# Patient Record
Sex: FEMALE | Race: WHITE | NOT HISPANIC OR LATINO | Employment: OTHER | URBAN - METROPOLITAN AREA
[De-identification: names, ages, dates, MRNs, and addresses within clinical notes are randomized per-mention and may not be internally consistent; named-entity substitution may affect disease eponyms.]

---

## 2017-04-13 DIAGNOSIS — F90.0 ATTENTION DEFICIT HYPERACTIVITY DISORDER (ADHD), PREDOMINANTLY INATTENTIVE TYPE: Chronic | ICD-10-CM

## 2017-04-13 RX ORDER — DEXTROAMPHETAMINE SACCHARATE, AMPHETAMINE ASPARTATE, DEXTROAMPHETAMINE SULFATE AND AMPHETAMINE SULFATE 5; 5; 5; 5 MG/1; MG/1; MG/1; MG/1
20 TABLET ORAL 2 TIMES DAILY
Qty: 60 TABLET | Refills: 0 | Status: SHIPPED | OUTPATIENT
Start: 2017-04-13 | End: 2017-05-31

## 2017-04-13 NOTE — TELEPHONE ENCOUNTER
"Called pt's ph# in chart-Got message \"the pt you are trying to reach is not available\". Unable to leave VM.     Rx is at my desk-Need to find out if pt wants us to mail rx or bring down to [facility]   "

## 2017-04-13 NOTE — TELEPHONE ENCOUNTER
Pt calling for refill of Adderall. Last written 12/26/16 #60 with 0 refills.   She would like to pick it up from the clinic so she can take it to her [facility].

## 2017-05-30 ENCOUNTER — TELEPHONE (OUTPATIENT)
Dept: INTERNAL MEDICINE | Facility: CLINIC | Age: 27
End: 2017-05-30

## 2017-05-30 DIAGNOSIS — F90.0 ATTENTION DEFICIT HYPERACTIVITY DISORDER (ADHD), PREDOMINANTLY INATTENTIVE TYPE: Chronic | ICD-10-CM

## 2017-05-30 NOTE — TELEPHONE ENCOUNTER
Reason for Call:  Medication or medication refill:    Do you use a [facility]?  Name of the pharmacy and phone number for the current request:  [facility]    Name of the medication requested: adderall    Other request: none    Can we leave a detailed message on this number? YES    Phone number patient can be reached at: Home number on file [phone] (home)    Best Time: anytime    Call taken on 5/30/2017 at 3:43 PM by Macie Diaz

## 2017-06-01 RX ORDER — DEXTROAMPHETAMINE SACCHARATE, AMPHETAMINE ASPARTATE, DEXTROAMPHETAMINE SULFATE AND AMPHETAMINE SULFATE 5; 5; 5; 5 MG/1; MG/1; MG/1; MG/1
20 TABLET ORAL 2 TIMES DAILY
Qty: 60 TABLET | Refills: 0 | Status: SHIPPED | OUTPATIENT
Start: 2017-06-01 | End: 2017-06-30

## 2017-06-30 ENCOUNTER — TELEPHONE (OUTPATIENT)
Dept: INTERNAL MEDICINE | Facility: CLINIC | Age: 27
End: 2017-06-30

## 2017-06-30 DIAGNOSIS — F90.0 ATTENTION DEFICIT HYPERACTIVITY DISORDER (ADHD), PREDOMINANTLY INATTENTIVE TYPE: Chronic | ICD-10-CM

## 2017-06-30 RX ORDER — DEXTROAMPHETAMINE SACCHARATE, AMPHETAMINE ASPARTATE, DEXTROAMPHETAMINE SULFATE AND AMPHETAMINE SULFATE 5; 5; 5; 5 MG/1; MG/1; MG/1; MG/1
20 TABLET ORAL 2 TIMES DAILY
Qty: 60 TABLET | Refills: 0 | Status: SHIPPED | OUTPATIENT
Start: 2017-07-29 | End: 2017-12-07

## 2017-06-30 RX ORDER — DEXTROAMPHETAMINE SACCHARATE, AMPHETAMINE ASPARTATE, DEXTROAMPHETAMINE SULFATE AND AMPHETAMINE SULFATE 5; 5; 5; 5 MG/1; MG/1; MG/1; MG/1
20 TABLET ORAL 2 TIMES DAILY
Qty: 60 TABLET | Refills: 0 | Status: SHIPPED | OUTPATIENT
Start: 2017-06-30 | End: 2017-09-05

## 2017-06-30 RX ORDER — DEXTROAMPHETAMINE SACCHARATE, AMPHETAMINE ASPARTATE, DEXTROAMPHETAMINE SULFATE AND AMPHETAMINE SULFATE 7.5; 7.5; 7.5; 7.5 MG/1; MG/1; MG/1; MG/1
30 TABLET ORAL 2 TIMES DAILY
Qty: 60 TABLET | Refills: 0 | Status: SHIPPED | OUTPATIENT
Start: 2017-08-28 | End: 2017-12-07 | Stop reason: DRUGHIGH

## 2017-06-30 NOTE — TELEPHONE ENCOUNTER
(Reason for Call:  Medication or medication refill:    Do you use a [facility]?  Name of the pharmacy and phone number for the current request:  [facility] [address] [facility] [address] [place] - [phone]    Name of the medication requested: Adderoll    Other request: no    Can we leave a detailed message on this number? YES    Phone number patient can be reached at: Cell number on file:    Telephone Information:   Mobile [phone]       Best Time: any    Call taken on 6/30/2017 at 1:20 PM by Estefany Farrell

## 2017-06-30 NOTE — TELEPHONE ENCOUNTER
Adderall      Last Written Prescription Date:  6/1/17  Last Fill Quantity: 60,   # refills: 0  Last Office Visit with [facility] prescribing provider: 10/20/16  Future Office visit:       Routing refill request to provider for review/approval because:  Drug not on the [facility] refill protocol or controlled substance.  Signed CSA form in chart.  [facility].

## 2017-06-30 NOTE — TELEPHONE ENCOUNTER
Attempted to contact patient, no answer, left voice message to call back.  Rx hand carried to [facility]

## 2017-09-05 ENCOUNTER — TELEPHONE (OUTPATIENT)
Dept: INTERNAL MEDICINE | Facility: CLINIC | Age: 27
End: 2017-09-05

## 2017-09-05 DIAGNOSIS — F90.0 ATTENTION DEFICIT HYPERACTIVITY DISORDER (ADHD), PREDOMINANTLY INATTENTIVE TYPE: Chronic | ICD-10-CM

## 2017-09-05 RX ORDER — DEXTROAMPHETAMINE SACCHARATE, AMPHETAMINE ASPARTATE, DEXTROAMPHETAMINE SULFATE AND AMPHETAMINE SULFATE 5; 5; 5; 5 MG/1; MG/1; MG/1; MG/1
20 TABLET ORAL 2 TIMES DAILY
Qty: 60 TABLET | Refills: 0 | Status: SHIPPED | OUTPATIENT
Start: 2017-09-05 | End: 2017-12-07

## 2017-09-05 NOTE — TELEPHONE ENCOUNTER
Reason for Call:  Medication or medication refill:    Do you use a [facility]?  Name of the [facility] and phone number for the current request[facility] [address] ([place]) - [phone]    Name of the medication requested: adderall    Other request: none    Can we leave a detailed message on this number? YES    Phone number patient can be reached at: Home number on file [phone] (home)    Best Time: any    Call taken on 9/5/2017 at 12:03 PM by Ju Daniels

## 2017-09-05 NOTE — TELEPHONE ENCOUNTER
[facility].    Adderall      Last Written Prescription Date:  8/28/17  Last Fill Quantity: 60,   # refills: 0  Last Office Visit with [facility], [facility] or [facility] prescribing provider: 10/20/16  Future Office visit:       Routing refill request to provider for review/approval because:  Drug not on the [facility], [facility] or [facility] refill protocol or controlled substance.

## 2017-09-06 NOTE — TELEPHONE ENCOUNTER
Please talk to the patient    I think she ( or the pharmacy) lost some prescriptions and she should look for them.   She should have plenty from [facility] from August 28    I issued a new one since she hasn't filled them ( checked in [place] prescription program)    She is due for annual exam in October

## 2017-09-06 NOTE — TELEPHONE ENCOUNTER
LM on patient's cell number to return call to clinic.  Rx in [facility] triage box.  CINDY Hernandez R.N.

## 2017-09-08 NOTE — TELEPHONE ENCOUNTER
Pt calling back.  She didn't realize PCP gave her 3 months of rx's in June.  She will check with pharm for refilling.    Per Yanelis in [facility]: June rx was for Adderall 20mg tabs, July rx was for Adderall 20mg tabs, and August rx was for Adderall 30mg tabs.      Spoke with PCP, she did the Aug rx for 30mg tabs by mistake--states should have been for 20mg tabs.  Advised Yanelis of this and she states the July rx wasn't filled yet.  She will fill the July rx for pt for this month.  And will shred the Aug rx for 30mg tabs.    The Adderall rx dated 9-5-17 destroyed.

## 2017-12-04 DIAGNOSIS — Z30.8 ENCOUNTER FOR OTHER CONTRACEPTIVE MANAGEMENT: ICD-10-CM

## 2017-12-07 ENCOUNTER — OFFICE VISIT (OUTPATIENT)
Dept: INTERNAL MEDICINE | Facility: CLINIC | Age: 27
End: 2017-12-07
Payer: COMMERCIAL

## 2017-12-07 VITALS
HEIGHT: 67 IN | TEMPERATURE: 98.2 F | WEIGHT: 147.7 LBS | BODY MASS INDEX: 23.18 KG/M2 | SYSTOLIC BLOOD PRESSURE: 100 MMHG | OXYGEN SATURATION: 98 % | HEART RATE: 90 BPM | DIASTOLIC BLOOD PRESSURE: 70 MMHG

## 2017-12-07 DIAGNOSIS — Z30.8 ENCOUNTER FOR OTHER CONTRACEPTIVE MANAGEMENT: ICD-10-CM

## 2017-12-07 DIAGNOSIS — F90.0 ATTENTION DEFICIT HYPERACTIVITY DISORDER (ADHD), PREDOMINANTLY INATTENTIVE TYPE: Chronic | ICD-10-CM

## 2017-12-07 PROCEDURE — 99213 OFFICE O/P EST LOW 20 MIN: CPT | Performed by: FAMILY MEDICINE

## 2017-12-07 RX ORDER — NORGESTIMATE-ETHINYL ESTRADIOL 7DAYSX3 28
TABLET ORAL
Qty: 84 TABLET | Refills: 0 | OUTPATIENT
Start: 2017-12-07

## 2017-12-07 RX ORDER — NORGESTIMATE AND ETHINYL ESTRADIOL 7DAYSX3 28
1 KIT ORAL DAILY
Qty: 84 TABLET | Refills: 3 | Status: SHIPPED | OUTPATIENT
Start: 2017-12-07 | End: 2018-05-09

## 2017-12-07 RX ORDER — DEXTROAMPHETAMINE SACCHARATE, AMPHETAMINE ASPARTATE, DEXTROAMPHETAMINE SULFATE AND AMPHETAMINE SULFATE 5; 5; 5; 5 MG/1; MG/1; MG/1; MG/1
20 TABLET ORAL 2 TIMES DAILY
Qty: 60 TABLET | Refills: 0 | Status: SHIPPED | OUTPATIENT
Start: 2017-12-07 | End: 2018-01-19

## 2017-12-07 ASSESSMENT — ANXIETY QUESTIONNAIRES
2. NOT BEING ABLE TO STOP OR CONTROL WORRYING: NOT AT ALL
GAD7 TOTAL SCORE: 4
7. FEELING AFRAID AS IF SOMETHING AWFUL MIGHT HAPPEN: SEVERAL DAYS
6. BECOMING EASILY ANNOYED OR IRRITABLE: SEVERAL DAYS
IF YOU CHECKED OFF ANY PROBLEMS ON THIS QUESTIONNAIRE, HOW DIFFICULT HAVE THESE PROBLEMS MADE IT FOR YOU TO DO YOUR WORK, TAKE CARE OF THINGS AT HOME, OR GET ALONG WITH OTHER PEOPLE: SOMEWHAT DIFFICULT
3. WORRYING TOO MUCH ABOUT DIFFERENT THINGS: NOT AT ALL
1. FEELING NERVOUS, ANXIOUS, OR ON EDGE: SEVERAL DAYS
5. BEING SO RESTLESS THAT IT IS HARD TO SIT STILL: NOT AT ALL

## 2017-12-07 ASSESSMENT — PATIENT HEALTH QUESTIONNAIRE - PHQ9: 5. POOR APPETITE OR OVEREATING: SEVERAL DAYS

## 2017-12-07 NOTE — PROGRESS NOTES
"CHIEF COMPLAINT    Med refill - Adderall      HISTORY    She has a h/o ADHD, combined for about 4 years.    Back in school right now would like to refill.      BCP's working well. Cycle regular.    Patient Active Problem List   Diagnosis     CARDIOVASCULAR SCREENING; LDL GOAL LESS THAN 160     Anxiety     Concentration deficit     ADHD, inattentive type     Current Outpatient Prescriptions   Medication Sig Dispense Refill     amphetamine-dextroamphetamine (ADDERALL) 20 MG per tablet Take 1 tablet (20 mg) by mouth 2 times daily 60 tablet 0     norgestim-eth estrad triphasic (TRINESSA, 28,) 0.18/0.215/0.25 MG-35 MCG per tablet Take 1 tablet by mouth daily 84 tablet 3       REVIEW OF SYSTEMS    Some wt gain  No HA  No CP or palpitation  No SOB      Past Medical History:   Diagnosis Date     Anxiety      Pap smear for cervical cancer screening 2008    biopsy taken and ok       EXAM  /70 (BP Location: Right arm, Patient Position: Sitting, Cuff Size: Adult Large)  Pulse 90  Temp 98.2  F (36.8  C) (Oral)  Ht 5' 6.75\" (1.695 m)  Wt 147 lb 11.2 oz (67 kg)  SpO2 98%  BMI 23.31 kg/m2    HEENT: neg  Neck: no mass  Chest: cl  CV: no murmur, RSR  Motor and gait normal      (F90.0) ADHD, inattentive type  Comment:   Plan: amphetamine-dextroamphetamine (ADDERALL) 20 MG         per tablet            (Z30.8) Encounter for other contraceptive management  Comment: refill  Plan: norgestim-eth estrad triphasic (TRINESSA, 28,)         0.18/0.215/0.25 MG-35 MCG per tablet              "

## 2017-12-07 NOTE — NURSING NOTE
"Chief Complaint   Patient presents with     Recheck Medication       Initial /70 (BP Location: Right arm, Patient Position: Sitting, Cuff Size: Adult Large)  Pulse 90  Temp 98.2  F (36.8  C) (Oral)  Ht 5' 6.75\" (1.695 m)  Wt 147 lb 11.2 oz (67 kg)  SpO2 98%  BMI 23.31 kg/m2 Estimated body mass index is 23.31 kg/(m^2) as calculated from the following:    Height as of this encounter: 5' 6.75\" (1.695 m).    Weight as of this encounter: 147 lb 11.2 oz (67 kg).  Medication Reconciliation: complete    "

## 2017-12-07 NOTE — MR AVS SNAPSHOT
"              After Visit Summary   2017    Gerri Rosado    MRN: [id number]           Patient Information     Date Of Birth          1990        Visit Information        Provider Department      2017 10:40 AM Regis Garcia MD [facility]        Today's Diagnoses     ADHD, inattentive type        Encounter for other contraceptive management           Follow-ups after your visit        Who to contact     If you have questions or need follow up information about today's clinic visit or your schedule please contact [facility] directly at [phone].  Normal or non-critical lab and imaging results will be communicated to you by Perkhart, letter or phone within 4 business days after the clinic has received the results. If you do not hear from us within 7 days, please contact the clinic through Perkhart or phone. If you have a critical or abnormal lab result, we will notify you by phone as soon as possible.  Submit refill requests through uVore or call your pharmacy and they will forward the refill request to us. Please allow 3 business days for your refill to be completed.          Additional Information About Your Visit        MyChart Information     uVore lets you send messages to your doctor, view your test results, renew your prescriptions, schedule appointments and more. To sign up, go to [email or web address] . Click on \"Log in\" on the left side of the screen, which will take you to the Welcome page. Then click on \"Sign up Now\" on the right side of the page.     You will be asked to enter the access code listed below, as well as some personal information. Please follow the directions to create your username and password.     Your access code is: [email or web address]  Expires: 3/7/2018 11:24 AM     Your access code will  in 90 days. If you need help or a new code, please call your [facility] or [phone].        Care EveryWhere ID     This is your " "Care EveryWhere ID. This could be used by other organizations to access your [place] medical records  [id number]        Your Vitals Were     Pulse Temperature Height Pulse Oximetry BMI (Body Mass Index)       90 98.2  F (36.8  C) (Oral) 5' 6.75\" (1.695 m) 98% 23.31 kg/m2        Blood Pressure from Last 3 Encounters:   12/07/17 100/70   10/20/16 110/72   10/19/15 (!) 88/58    Weight from Last 3 Encounters:   12/07/17 147 lb 11.2 oz (67 kg)   10/20/16 136 lb 3.2 oz (61.8 kg)   10/19/15 132 lb 6.4 oz (60.1 kg)              Today, you had the following     No orders found for display         Today's Medication Changes          These changes are accurate as of: 12/7/17 11:24 AM.  If you have any questions, ask your nurse or doctor.               These medicines have changed or have updated prescriptions.        Dose/Directions    amphetamine-dextroamphetamine 20 MG per tablet   Commonly known as:  ADDERALL   This may have changed:  Another medication with the same name was removed. Continue taking this medication, and follow the directions you see here.   Used for:  Attention deficit hyperactivity disorder (ADHD), predominantly inattentive type   Changed by:  Regis Garcia MD        Dose:  20 mg   Take 1 tablet (20 mg) by mouth 2 times daily   Quantity:  60 tablet   Refills:  0            Where to get your medicines      These medications were sent to [facility] [address] [place] [address] & [facility]  [address] [place] [address]     Phone:  [phone]     norgestim-eth estrad triphasic 0.18/0.215/0.25 MG-35 MCG per tablet         Some of these will need a paper prescription and others can be bought over the counter.  Ask your nurse if you have questions.     Bring a paper prescription for each of these medications     amphetamine-dextroamphetamine 20 MG per tablet                Primary Care Provider Office Phone # Fax #    Mary Ann Aguilera MD " [phone]       [address]  [facility] [address]        Equal Access to Services     VARUN SOLER : Hadii kandy donnelly asher Angelo, waaxda luqadaha, qaybta kaalmada lilliam, france perera laJnyesy frank. So [facility] [phone].    ATENCIÓN: Si habla español, tiene a koenig disposición servicios gratuitos de asistencia lingüística. Llame al [phone].    We comply with applicable federal civil rights laws and [place] laws. We do not discriminate on the basis of race, color, national origin, age, disability, sex, sexual orientation, or gender identity.            Thank you!     Thank you for choosing [facility]  for your care. Our goal is always to provide you with excellent care. Hearing back from our patients is one way we can continue to improve our services. Please take a few minutes to complete the written survey that you may receive in the mail after your visit with us. Thank you!             Your Updated Medication List - Protect others around you: Learn how to safely use, store and throw away your medicines at www.disposemymeds.org.          This list is accurate as of: 12/7/17 11:24 AM.  Always use your most recent med list.                   Brand Name Dispense Instructions for use Diagnosis    amphetamine-dextroamphetamine 20 MG per tablet    ADDERALL    60 tablet    Take 1 tablet (20 mg) by mouth 2 times daily    Attention deficit hyperactivity disorder (ADHD), predominantly inattentive type       norgestim-eth estrad triphasic 0.18/0.215/0.25 MG-35 MCG per tablet    TRINESSA (28)    84 tablet    Take 1 tablet by mouth daily    Encounter for other contraceptive management

## 2017-12-08 ASSESSMENT — ANXIETY QUESTIONNAIRES: GAD7 TOTAL SCORE: 4

## 2018-01-19 DIAGNOSIS — F41.9 ANXIETY: ICD-10-CM

## 2018-01-19 DIAGNOSIS — F90.0 ATTENTION DEFICIT HYPERACTIVITY DISORDER (ADHD), PREDOMINANTLY INATTENTIVE TYPE: Chronic | ICD-10-CM

## 2018-01-19 DIAGNOSIS — Z79.899 CONTROLLED SUBSTANCE AGREEMENT SIGNED: Primary | ICD-10-CM

## 2018-01-19 DIAGNOSIS — R41.840 CONCENTRATION DEFICIT: ICD-10-CM

## 2018-01-19 RX ORDER — DEXTROAMPHETAMINE SACCHARATE, AMPHETAMINE ASPARTATE, DEXTROAMPHETAMINE SULFATE AND AMPHETAMINE SULFATE 5; 5; 5; 5 MG/1; MG/1; MG/1; MG/1
20 TABLET ORAL 2 TIMES DAILY
Qty: 60 TABLET | Refills: 0 | Status: SHIPPED | OUTPATIENT
Start: 2018-01-19 | End: 2018-02-06

## 2018-01-19 NOTE — TELEPHONE ENCOUNTER
Call received from pt requesting refill for Adderral. Requesting rx be mailed.    Adderral      Last Written Prescription Date:  12/7/17  Last Fill Quantity: 60,   # refills: 0  Last Office Visit: 12/7/17  Future Office visit:       Routing refill request to provider for review/approval because:  Drug not on the [facility], [facility] or  [facility] refill protocol or controlled substance  CSA signed  [place]:  reviewed- no concerns    [place]  [email or web address]

## 2018-02-06 DIAGNOSIS — F90.0 ATTENTION DEFICIT HYPERACTIVITY DISORDER (ADHD), PREDOMINANTLY INATTENTIVE TYPE: Chronic | ICD-10-CM

## 2018-02-06 RX ORDER — DEXTROAMPHETAMINE SACCHARATE, AMPHETAMINE ASPARTATE, DEXTROAMPHETAMINE SULFATE AND AMPHETAMINE SULFATE 5; 5; 5; 5 MG/1; MG/1; MG/1; MG/1
20 TABLET ORAL 2 TIMES DAILY
Qty: 60 TABLET | Refills: 0 | Status: SHIPPED | OUTPATIENT
Start: 2018-02-06 | End: 2018-03-15

## 2018-02-06 NOTE — TELEPHONE ENCOUNTER
Pt is calling because [facility] still has not received the Adderall Rx that was mailed out on 1/19.   Call to [facility] and confirmed that they didn't received the Rx from 1/19.     Pt states she is out of the medication and is asking if she can come to the clinic to  Rx.     Will route to Dr Maravilla for new Rx.

## 2018-03-13 ENCOUNTER — TELEPHONE (OUTPATIENT)
Dept: INTERNAL MEDICINE | Facility: CLINIC | Age: 28
End: 2018-03-13

## 2018-03-13 DIAGNOSIS — F90.0 ATTENTION DEFICIT HYPERACTIVITY DISORDER (ADHD), PREDOMINANTLY INATTENTIVE TYPE: Chronic | ICD-10-CM

## 2018-03-13 NOTE — TELEPHONE ENCOUNTER
Reason for Call:  Medication or medication refill:    Do you use a [facility]? Yes     Name of the pharmacy and phone number for the current request:  [facility]     Name of the medication requested: adderall    Other request: none    Can we leave a detailed message on this number? YES    Phone number patient can be reached at: Home number on file [phone] (home)    Best Time: asap    Call taken on 3/13/2018 at 3:52 PM by Olamide [place]

## 2018-03-13 NOTE — TELEPHONE ENCOUNTER
Adderall  20 mg    Last Written Prescription Date:  2/6/18  Last Fill Quantity: 60,   # refills: 0  Last Office Visit: 12/7/17  Future Office visit:       Routing refill request to provider for review/approval because:  Drug not on the [facility], [facility] or  [facility] refill protocol or controlled substance  Signed CSA on file  [place] reviewed:  reviewed- no concerns    [place]:  [email or web address]

## 2018-03-15 RX ORDER — DEXTROAMPHETAMINE SACCHARATE, AMPHETAMINE ASPARTATE, DEXTROAMPHETAMINE SULFATE AND AMPHETAMINE SULFATE 5; 5; 5; 5 MG/1; MG/1; MG/1; MG/1
20 TABLET ORAL 2 TIMES DAILY
Qty: 60 TABLET | Refills: 0 | Status: SHIPPED | OUTPATIENT
Start: 2018-03-15 | End: 2018-04-19

## 2018-03-15 NOTE — TELEPHONE ENCOUNTER
Rx done per PCP and will be hand carried to [facility] around lunch time today.    Detailed message left on pt's vm.

## 2018-03-16 ENCOUNTER — OFFICE VISIT (OUTPATIENT)
Dept: OBGYN | Facility: CLINIC | Age: 28
End: 2018-03-16
Payer: COMMERCIAL

## 2018-03-16 VITALS
BODY MASS INDEX: 23.43 KG/M2 | SYSTOLIC BLOOD PRESSURE: 106 MMHG | HEIGHT: 67 IN | DIASTOLIC BLOOD PRESSURE: 74 MMHG | WEIGHT: 149.3 LBS

## 2018-03-16 DIAGNOSIS — Z30.8 ENCOUNTER FOR OTHER CONTRACEPTIVE MANAGEMENT: Primary | ICD-10-CM

## 2018-03-16 PROCEDURE — 99201 ZZC OFFICE/OUTPT VISIT, NEW, LEVEL I: CPT | Performed by: ADVANCED PRACTICE MIDWIFE

## 2018-03-16 NOTE — NURSING NOTE
"Chief Complaint   Patient presents with     Consult     Contraception     Discuss IUD.    Initial /74  Ht 5' 6.75\" (1.695 m)  Wt 149 lb 4.8 oz (67.7 kg)  LMP 02/26/2018  Breastfeeding? No  BMI 23.56 kg/m2 Estimated body mass index is 23.56 kg/(m^2) as calculated from the following:    Height as of this encounter: 5' 6.75\" (1.695 m).    Weight as of this encounter: 149 lb 4.8 oz (67.7 kg).  Medication Reconciliation: complete     Herminia Garza, YE      "

## 2018-03-16 NOTE — PROGRESS NOTES
SUBJECTIVE:   Gerri Rosado is a 27 year old who presents to the clinic for discussion of birth control methods.   She has used the following methods in the past: ANGELA  Today she is interested in discussing Mirena IUD.  Gerri presents to the clinic without an appointment to discuss getting an IUD. She is currently using OCP's. She struggles to remember to take them. She is nulliparous.   Histories reviewed and updated  Past Medical History:   Diagnosis Date     Anxiety      Pap smear for cervical cancer screening 2008    biopsy taken and ok     Past Surgical History:   Procedure Laterality Date     NO HISTORY OF SURGERY       Social History     Social History     Marital status: Single     Spouse name: N/A     Number of children: N/A     Years of education: N/A     Occupational History     Not on file.     Social History Main Topics     Smoking status: Former Smoker     Packs/day: 0.10     Years: 5.00     Types: Cigarettes     Quit date: 12/2/2012     Smokeless tobacco: Never Used     Alcohol use Yes      Comment: 2 drinks weekly     Drug use: No     Sexual activity: Yes     Partners: Male     Birth control/ protection: Pill     Other Topics Concern     Not on file     Social History Narrative     Family History   Problem Relation Age of Onset     CANCER Mother      thyroid     Hypertension Father      Lipids Father      Breast Cancer Other      Depression Other      Anxiety Disorder Other      MENTAL ILLNESS Other      Substance Abuse Other        Menstrual History:  Menses every 28 days.  Length of menses: 5 days  Menstrual description: normal    Denies the following contraindications to estrogen/progesterone combined contraception:  Migraine with aura  Smoking over age 35  Liver disease  Personal history of blood clot or stroke   History of heart disease  History of breast cancer  Undiagnosed vaginal bleeding  Hypertension  Pregnancy    Health maintenance updated:  yes    ROS:   12 point review of systems  "negative other than symptoms noted below.    EXAM:  /74  Ht 5' 6.75\" (1.695 m)  Wt 149 lb 4.8 oz (67.7 kg)  LMP 02/26/2018  Breastfeeding? No  BMI 23.56 kg/m2  Body mass index is 23.56 kg/(m^2).    ASSESSMENT/PLAN:    ICD-10-CM    1. Encounter for other contraceptive management Z30.8      There are no contraindications to the use of Mirena IUD    COUNSELING:  Reviewed risks and benefits of contraceptive use  Mirena IUD. Educated on insertion/MOA/SE/risk. Advised patient that it is best to come in for insertion on her menstrual cycle. Patient agrees with plan.   Discussed proper use of chosen method  Handouts/Instrucions provided    Sharla Honeycutt CNM    "

## 2018-03-16 NOTE — MR AVS SNAPSHOT
"              After Visit Summary   3/16/2018    Gerir Rosado    MRN: [id number]           Patient Information     Date Of Birth          1990        Visit Information        Provider Department      3/16/2018 1:30 PM Sharla Honeycutt APRN CNM [facility]        Today's Diagnoses     Encounter for other contraceptive management    -  1       Follow-ups after your visit        Follow-up notes from your care team     Return for IUD insertion on cycle.      Your next 10 appointments already scheduled     Mar 28, 2018 10:45 AM CDT   IUD INSERTION AND REMOVAL with QUAN Olivera CNM   [facility]    [address]  [facility] [id number] [phone]              Who to contact     If you have questions or need follow up information about today's clinic visit or your schedule please contact [facility] directly at [phone].  Normal or non-critical lab and imaging results will be communicated to you by MyChart, letter or phone within 4 business days after the clinic has received the results. If you do not hear from us within 7 days, please contact the clinic through Omnia Mediahart or phone. If you have a critical or abnormal lab result, we will notify you by phone as soon as possible.  Submit refill requests through [email or web address] or call your pharmacy and they will forward the refill request to us. Please allow 3 business days for your refill to be completed.          Additional Information About Your Visit        Omnia Mediahart Information     [email or web address] lets you send messages to your doctor, view your test results, renew your prescriptions, schedule appointments and more. To sign up, go to [email or web address] . Click on \"Log in\" on the left side of the screen, which will take you to the Welcome page. Then click on \"Sign up Now\" on the right side of the page.     You will be asked to enter the access code listed " "below, as well as some personal information. Please follow the directions to create your username and password.     Your access code is: [email or web address]  Expires: 2018  1:44 PM     Your access code will  in 90 days. If you need help or a new code, please call your [place] clinic or [phone].        Care EveryWhere ID     This is your Care EveryWhere ID. This could be used by other organizations to access your [place] medical records  [id number]        Your Vitals Were     Height Last Period Breastfeeding? BMI (Body Mass Index)          5' 6.75\" (1.695 m) 2018 No 23.56 kg/m2         Blood Pressure from Last 3 Encounters:   18 106/74   17 100/70   10/20/16 110/72    Weight from Last 3 Encounters:   18 149 lb 4.8 oz (67.7 kg)   17 147 lb 11.2 oz (67 kg)   10/20/16 136 lb 3.2 oz (61.8 kg)              Today, you had the following     No orders found for display       Primary Care Provider Office Phone # Fax #    Mary Ann Sherry Aguilera [phone]       [address] [facility] [address]        Equal Access to Services     [facility] HospitalMAIA : Hadii aad ku hadasho Soomaali, waaxda luqadaha, qaybta kaalmada adeegyada, france idiin hayaan debby delaney . So [facility] [phone].    ATENCIÓN: Si habla español, tiene a koenig disposición servicios gratuitos de asistencia lingüística. Llame al [phone].    We comply with applicable federal civil rights laws and [place] laws. We do not discriminate on the basis of race, color, national origin, age, disability, sex, sexual orientation, or gender identity.            Thank you!     Thank you for choosing [facility]  for your care. Our goal is always to provide you with excellent care. Hearing back from our patients is one way we can continue to improve our services. Please take a few minutes to complete the written survey that you may receive in the mail after your visit with us. Thank " you!             Your Updated Medication List - Protect others around you: Learn how to safely use, store and throw away your medicines at www.disposemymeds.org.          This list is accurate as of 3/16/18  1:44 PM.  Always use your most recent med list.                   Brand Name Dispense Instructions for use Diagnosis    amphetamine-dextroamphetamine 20 MG per tablet    ADDERALL    60 tablet    Take 1 tablet (20 mg) by mouth 2 times daily    Attention deficit hyperactivity disorder (ADHD), predominantly inattentive type       norgestim-eth estrad triphasic 0.18/0.215/0.25 MG-35 MCG per tablet    TRINESSA (28)    84 tablet    Take 1 tablet by mouth daily    Encounter for other contraceptive management

## 2018-03-28 ENCOUNTER — OFFICE VISIT (OUTPATIENT)
Dept: OBGYN | Facility: CLINIC | Age: 28
End: 2018-03-28
Payer: COMMERCIAL

## 2018-03-28 VITALS
WEIGHT: 150 LBS | SYSTOLIC BLOOD PRESSURE: 102 MMHG | BODY MASS INDEX: 23.54 KG/M2 | HEIGHT: 67 IN | DIASTOLIC BLOOD PRESSURE: 68 MMHG

## 2018-03-28 DIAGNOSIS — Z30.430 ENCOUNTER FOR INSERTION OF INTRAUTERINE CONTRACEPTIVE DEVICE (IUD): Primary | ICD-10-CM

## 2018-03-28 DIAGNOSIS — Z97.5 IUD (INTRAUTERINE DEVICE) IN PLACE: ICD-10-CM

## 2018-03-28 LAB — HCG, QUAL URINE: NEGATIVE

## 2018-03-28 PROCEDURE — 84703 CHORIONIC GONADOTROPIN ASSAY: CPT | Performed by: ADVANCED PRACTICE MIDWIFE

## 2018-03-28 PROCEDURE — 58300 INSERT INTRAUTERINE DEVICE: CPT | Performed by: ADVANCED PRACTICE MIDWIFE

## 2018-03-28 NOTE — PROGRESS NOTES
"Chief Complaint/ History of Present Illness:Gerri Rosado is a 27 year old single female.   Patient's last menstrual period was 03/27/2018 (exact date)..  Today's pregnancy test negative.  She is here for an IUD insertion.  Patient has been given written information.  I have reviewed the risks of the IUD including pregnancy, PID, life threatening infection, perforation, expulsion, cramping, changes in bleeding and ovarian cysts. Benefits of the IUD and alternative family planning methods have been discussed.  Patients questions are answered.  Patient has verbalized understanding of risks and benefits and has signed the consent form.    Allergies   Allergen Reactions     No Known Allergies      Current Outpatient Prescriptions   Medication Sig Dispense Refill     amphetamine-dextroamphetamine (ADDERALL) 20 MG per tablet Take 1 tablet (20 mg) by mouth 2 times daily 60 tablet 0     norgestim-eth estrad triphasic (TRINESSA, 28,) 0.18/0.215/0.25 MG-35 MCG per tablet Take 1 tablet by mouth daily 84 tablet 3      Past Medical History:   Diagnosis Date     Anxiety      Pap smear for cervical cancer screening 2008    biopsy taken and ok     Past Surgical History:   Procedure Laterality Date     NO HISTORY OF SURGERY         REVIEW OF SYSTEMS  CONSTITUTIONAL: Denies fever, chills and night sweats  BREASTS:  Denies discharge and lumps.    HEART/LUNGS: Denies dyspnea, wheezing, coughing and chest pain.  HEMATOLOGIC: Denies tendency to bruise and history of blood clots.  GENITOURINARY:  Denies urgency, dysuria and hematuria.  NEUROLOGIC:  Denies seizures, weakness and fainting.  PSYCHIATRIC: Negative for changes in mood or affect      EXAM:  /68  Ht 5' 6.75\" (1.695 m)  Wt 150 lb (68 kg)  LMP 03/27/2018 (Exact Date)  Breastfeeding? No  BMI 23.67 kg/m2    Abdomen: soft, nontender, without hepatosplenomegaly or masses  : normal cervix, adnexae, and uterus without masses or discharge and rectal exam normal without " masses-guaiac negative stool  IUD type: Mirena  Lot # [id number]  Exp: 09/20    Procedure:  Uterus assessed for position and is Anteverted and Midposition.  Speculum inserted.  Betadine prep of cervix done.  Tenaculum applied at 10 and 2 o'clock position and gentle traction was applied to elongate the cervical canal.  Uterus sounded to 7cm's.  Cervical dilators not used.   IUD inserted in the usual fashion without problems, ie: resistance, severe protracted pain or excessive bleeding.  Tenaculum was removed with scant bleeding from the tenaculum site that was managed with some direct pressure using Xavier swabs.  Strings trimmed to 2 cm's.  Patient tolerated the procedure well without any prolonged pain or syncopy.      ASSESSMENT/ PLAN:  (Z30.430) Encounter for insertion of intrauterine contraceptive device (IUD)  (primary encounter diagnosis)  Plan: HCL HCG, URINE, NURSE BACKOFFICE    GC/Chlamydia Screening:  PATIENT DECLINED      Instructions given to patient regarding checking IUD strings, returning to the clinic if pain or inability to check strings and/or irregular bleeding.    Pt to RTC in 4-6 weeks for IUD check.    Sharla Honeycutt CNM

## 2018-03-28 NOTE — NURSING NOTE
"Chief Complaint   Patient presents with     IUD     Contraception       Initial /68  Ht 5' 6.75\" (1.695 m)  Wt 150 lb (68 kg)  LMP 03/27/2018 (Exact Date)  Breastfeeding? No  BMI 23.67 kg/m2 Estimated body mass index is 23.67 kg/(m^2) as calculated from the following:    Height as of this encounter: 5' 6.75\" (1.695 m).    Weight as of this encounter: 150 lb (68 kg).  Medication Reconciliation: complete     Herminia Garza CMA      "

## 2018-03-28 NOTE — MR AVS SNAPSHOT
"              After Visit Summary   3/28/2018    Gerri Rosado    MRN: [id number]           Patient Information     Date Of Birth          1990        Visit Information        Provider Department      3/28/2018 10:45 AM Sharla Honeycutt APRN CNM [facility]        Today's Diagnoses     Encounter for insertion of intrauterine contraceptive device (IUD)    -  1       Follow-ups after your visit        Follow-up notes from your care team     Return in about 6 weeks (around 2018), or IUD check .      Who to contact     If you have questions or need follow up information about today's clinic visit or your schedule please contact [facility] directly at [phone].  Normal or non-critical lab and imaging results will be communicated to you by [employer], letter or phone within 4 business days after the clinic has received the results. If you do not hear from us within 7 days, please contact the clinic through [employer] or phone. If you have a critical or abnormal lab result, we will notify you by phone as soon as possible.  Submit refill requests through [employer] or call your pharmacy and they will forward the refill request to us. Please allow 3 business days for your refill to be completed.          Additional Information About Your Visit        MyChart Information     [employer] lets you send messages to your doctor, view your test results, renew your prescriptions, schedule appointments and more. To sign up, go to [email or web address] . Click on \"Log in\" on the left side of the screen, which will take you to the Welcome page. Then click on \"Sign up Now\" on the right side of the page.     You will be asked to enter the access code listed below, as well as some personal information. Please follow the directions to create your username and password.     Your access code is: [email or web address]  Expires: 2018  1:44 PM     Your access code will  in 90 days. If you need " "help or a new code, please call your [place] clinic or [phone].        Care EveryWhere ID     This is your Care EveryWhere ID. This could be used by other organizations to access your [place] medical records  [id number]        Your Vitals Were     Height Last Period Breastfeeding? BMI (Body Mass Index)          5' 6.75\" (1.695 m) 03/27/2018 (Exact Date) No 23.67 kg/m2         Blood Pressure from Last 3 Encounters:   03/28/18 102/68   03/16/18 106/74   12/07/17 100/70    Weight from Last 3 Encounters:   03/28/18 150 lb (68 kg)   03/16/18 149 lb 4.8 oz (67.7 kg)   12/07/17 147 lb 11.2 oz (67 kg)              We Performed the Following     HC LEVONORGESTREL IU 52MG 5 YR     HCL HCG, URINE, NURSE BACKOFFICE     INSERTION INTRAUTERINE DEVICE          Today's Medication Changes          These changes are accurate as of 3/28/18 11:23 AM.  If you have any questions, ask your nurse or doctor.               Start taking these medicines.        Dose/Directions    levonorgestrel 20 MCG/24HR IUD   Commonly known as:  MIRENA (52 MG)   Used for:  Encounter for insertion of intrauterine contraceptive device (IUD)   Started by:  Sharla Honeycutt APRN CNM        Dose:  1 each   1 each (20 mcg) by Intrauterine route once for 1 dose   Quantity:  1 each   Refills:  0            Where to get your medicines      Some of these will need a paper prescription and others can be bought over the counter.  Ask your nurse if you have questions.     You don't need a prescription for these medications     levonorgestrel 20 MCG/24HR IUD                Primary Care Provider Office Phone # Fax #    Mary Ann Aguilera [phone]       [address]  [facility] [address]        Equal Access to Services     [facility] : Rachel terryo Sojuan pablo, waaxda luqadaha, qaybta kaalmada adeegyadonna, france frank. So [facility] [phone].    ATENCIÓN: Si tiarra rojas " disposición servicios gratuitos de asistencia lingüística. [place] [phone].    We comply with applicable federal civil rights laws and [place] laws. We do not discriminate on the basis of race, color, national origin, age, disability, sex, sexual orientation, or gender identity.            Thank you!     Thank you for choosing [facility]  for your care. Our goal is always to provide you with excellent care. Hearing back from our patients is one way we can continue to improve our services. Please take a few minutes to complete the written survey that you may receive in the mail after your visit with us. Thank you!             Your Updated Medication List - Protect others around you: Learn how to safely use, store and throw away your medicines at www.disposemymeds.org.          This list is accurate as of 3/28/18 11:23 AM.  Always use your most recent med list.                   Brand Name Dispense Instructions for use Diagnosis    amphetamine-dextroamphetamine 20 MG per tablet    ADDERALL    60 tablet    Take 1 tablet (20 mg) by mouth 2 times daily    Attention deficit hyperactivity disorder (ADHD), predominantly inattentive type       levonorgestrel 20 MCG/24HR IUD    MIRENA (52 MG)    1 each    1 each (20 mcg) by Intrauterine route once for 1 dose    Encounter for insertion of intrauterine contraceptive device (IUD)       norgestim-eth estrad triphasic 0.18/0.215/0.25 MG-35 MCG per tablet    TRINESSA (28)    84 tablet    Take 1 tablet by mouth daily    Encounter for other contraceptive management

## 2018-04-19 DIAGNOSIS — F90.0 ATTENTION DEFICIT HYPERACTIVITY DISORDER (ADHD), PREDOMINANTLY INATTENTIVE TYPE: Chronic | ICD-10-CM

## 2018-04-19 NOTE — TELEPHONE ENCOUNTER
Take rx to [facility].    Adderall  20 mg    Last Written Prescription Date:  3/15/18  Last Fill Quantity: 60,   # refills: 0  Last Office Visit: 12/7/17  Future Office visit:       Routing refill request to provider for review/approval because:  Drug not on the FMG, UMP or [facility] refill protocol or controlled substance  CSA on file  [place] reviewed:  reviewed- no concerns    [place]  [email or web address]

## 2018-04-19 NOTE — TELEPHONE ENCOUNTER
Reason for Call:  Medication or medication refill:    Do you use a [facility]? Yes      Name of the pharmacy and phone number for the current request:  [facility]     Name of the medication requested: adderall     Other request: none     Can we leave a detailed message on this number? YES    Phone number patient can be reached at: Home number on file [phone] (home)    Best Time: asap    Call taken on 4/19/2018 at 2:33 PM by Olamide Norman

## 2018-04-20 RX ORDER — DEXTROAMPHETAMINE SACCHARATE, AMPHETAMINE ASPARTATE, DEXTROAMPHETAMINE SULFATE AND AMPHETAMINE SULFATE 5; 5; 5; 5 MG/1; MG/1; MG/1; MG/1
20 TABLET ORAL 2 TIMES DAILY
Qty: 60 TABLET | Refills: 0 | Status: SHIPPED | OUTPATIENT
Start: 2018-04-20 | End: 2018-05-23

## 2018-04-20 NOTE — TELEPHONE ENCOUNTER
Called pt-let message I will bring rx down to pharm around 10am (also relayed pt needs apt in June)

## 2018-05-09 ENCOUNTER — OFFICE VISIT (OUTPATIENT)
Dept: OBGYN | Facility: CLINIC | Age: 28
End: 2018-05-09
Payer: COMMERCIAL

## 2018-05-09 VITALS
HEIGHT: 67 IN | SYSTOLIC BLOOD PRESSURE: 100 MMHG | DIASTOLIC BLOOD PRESSURE: 60 MMHG | WEIGHT: 147.9 LBS | BODY MASS INDEX: 23.21 KG/M2 | TEMPERATURE: 98.3 F

## 2018-05-09 DIAGNOSIS — Z30.431 IUD CHECK UP: Primary | ICD-10-CM

## 2018-05-09 PROCEDURE — 99212 OFFICE O/P EST SF 10 MIN: CPT | Performed by: ADVANCED PRACTICE MIDWIFE

## 2018-05-09 NOTE — PROGRESS NOTES
"S: Gerri Rosado   is a 27 year old  here for IUD check.  She denies problems with the IUD other than irregular spotting since placement.  She has attempted to check for the strings and is comfortable feeling them. She reports having cramping for two days after insertion that resolved and has not come back. She has been spotting almost daily since insertion.     O: /60 (BP Location: Left arm, Patient Position: Chair, Cuff Size: Adult Regular)  Temp 98.3  F (36.8  C) (Oral)  Ht 5' 6.75\" (1.695 m)  Wt 147 lb 14.4 oz (67.1 kg)  LMP 04/25/2018  BMI 23.34 kg/m2  PELVIC EXAM:  Vulva: No external lesions, normal hair distribution, no adenopathy, BUS WNL  Vagina: Moist, pink, no abnormal discharge, well rugated, no lesions  Cervix: smooth, pink, no visible lesions, neg CMT  IUD strings visualized and are extruding approximately 2 cm from cervical os        ASSESSMENT:  1) IUD normally placed.     PLAN:  (Z30.431) IUD check up  (primary encounter diagnosis)      RTC when due for annual exam or suspected problems with her IUD such as abnormal bleeding, disappearance of the strings or feeling the IUD in her cervix or with any pain with intercourse, or abnormal discharge.  If she starts having increased cramping with menses,  ibuprofen 600-800mg po TID with food can be used.  If she decides that she wants to attempt pregnancy in the future, she will schedule an appointment to have the IUD removed.   Reviewed that the IUD will need to be replaced for reasons of effectiveness in  5 yrs.    Discussed that irregular bleeding is normal for up to three months after IUD insertion. If she still feels like her bleeding abnormal at the three month donato, encouraged patient to come to clinic to discuss options to help control the bleeding. Patient verbalizes understanding and agrees with plan.     Sharla Honeycutt CNM    "

## 2018-05-09 NOTE — NURSING NOTE
"Chief Complaint   Patient presents with     Follow Up For     03/28/18 Mirena     Vaginal Bleeding     Spotting almost daily       Initial /60 (BP Location: Left arm, Patient Position: Chair, Cuff Size: Adult Regular)  Temp 98.3  F (36.8  C) (Oral)  Ht 5' 6.75\" (1.695 m)  Wt 147 lb 14.4 oz (67.1 kg)  LMP 04/25/2018  BMI 23.34 kg/m2 Estimated body mass index is 23.34 kg/(m^2) as calculated from the following:    Height as of this encounter: 5' 6.75\" (1.695 m).    Weight as of this encounter: 147 lb 14.4 oz (67.1 kg).  BP completed using cuff size: regular    No obstetric history on file. No pregnancies    The following HM Due: NONE      The following patient reported/Care Every where data was sent to:  P ABSTRACT QUALITY INITIATIVES [15330]        patient has appointment for today              "

## 2018-05-09 NOTE — MR AVS SNAPSHOT
"              After Visit Summary   2018    Gerri Rosado    MRN: [id number]           Patient Information     Date Of Birth          1990        Visit Information        Provider Department      2018 10:30 AM Sharla Honeycutt APRN CNM [facility]        Today's Diagnoses     IUD check up    -  1       Follow-ups after your visit        Follow-up notes from your care team     Return in about 1 year (around 2019) for Routine Visit.      Who to contact     If you have questions or need follow up information about today's clinic visit or your schedule please contact [facility] directly at [phone].  Normal or non-critical lab and imaging results will be communicated to you by MyChart, letter or phone within 4 business days after the clinic has received the results. If you do not hear from us within 7 days, please contact the clinic through MyChart or phone. If you have a critical or abnormal lab result, we will notify you by phone as soon as possible.  Submit refill requests through [employer] or call your pharmacy and they will forward the refill request to us. Please allow 3 business days for your refill to be completed.          Additional Information About Your Visit        MyChart Information     [employer] lets you send messages to your doctor, view your test results, renew your prescriptions, schedule appointments and more. To sign up, go to [email or web address] . Click on \"Log in\" on the left side of the screen, which will take you to the Welcome page. Then click on \"Sign up Now\" on the right side of the page.     You will be asked to enter the access code listed below, as well as some personal information. Please follow the directions to create your username and password.     Your access code is: [email or web address]  Expires: 2018  1:44 PM     Your access code will  in 90 days. If you need help or a new code, please call your [facility] or " "[phone].        Care EveryWhere ID     This is your Care EveryWhere ID. This could be used by other organizations to access your [place] medical records  [id number]        Your Vitals Were     Temperature Height Last Period BMI (Body Mass Index)          98.3  F (36.8  C) (Oral) 5' 6.75\" (1.695 m) 04/25/2018 23.34 kg/m2         Blood Pressure from Last 3 Encounters:   05/09/18 100/60   03/28/18 102/68   03/16/18 106/74    Weight from Last 3 Encounters:   05/09/18 147 lb 14.4 oz (67.1 kg)   03/28/18 150 lb (68 kg)   03/16/18 149 lb 4.8 oz (67.7 kg)              Today, you had the following     No orders found for display         Today's Medication Changes          These changes are accurate as of 5/9/18 10:50 AM.  If you have any questions, ask your nurse or doctor.               Stop taking these medicines if you haven't already. Please contact your care team if you have questions.     norgestim-eth estrad triphasic 0.18/0.215/0.25 MG-35 MCG per tablet   Commonly known as:  TRINESSA (28)   Stopped by:  Sharla Honeycutt APRN CNM                    Primary Care Provider Office Phone # Fax #    Mary Ann Sherry Aguilera [phone]       [address] [facility] [address]        Equal Access to Services     VARUN SOLER AH: Hadii kandy ku hadasho Soomaali, waaxda luqadaha, qaybta kaalmada adeegyada, france whitten adedenilson frank. So [facility] [phone].    ATENCIÓN: Si habla español, tiene a koenig disposición servicios gratuitos de asistencia lingüística. Llame al [phone].    We comply with applicable federal civil rights laws and [place] laws. We do not discriminate on the basis of race, color, national origin, age, disability, sex, sexual orientation, or gender identity.            Thank you!     Thank you for choosing [facility]  for your care. Our goal is always to provide you with excellent care. Hearing back from our patients is one way we " can continue to improve our services. Please take a few minutes to complete the written survey that you may receive in the mail after your visit with us. Thank you!             Your Updated Medication List - Protect others around you: Learn how to safely use, store and throw away your medicines at www.disposemymeds.org.          This list is accurate as of 5/9/18 10:50 AM.  Always use your most recent med list.                   Brand Name Dispense Instructions for use Diagnosis    amphetamine-dextroamphetamine 20 MG per tablet    ADDERALL    60 tablet    Take 1 tablet (20 mg) by mouth 2 times daily    Attention deficit hyperactivity disorder (ADHD), predominantly inattentive type       levonorgestrel 20 MCG/24HR IUD    MIRENA (52 MG)    1 each    1 each (20 mcg) by Intrauterine route once for 1 dose    Encounter for insertion of intrauterine contraceptive device (IUD)

## 2018-05-23 ENCOUNTER — TELEPHONE (OUTPATIENT)
Dept: INTERNAL MEDICINE | Facility: CLINIC | Age: 28
End: 2018-05-23

## 2018-05-23 DIAGNOSIS — F90.0 ATTENTION DEFICIT HYPERACTIVITY DISORDER (ADHD), PREDOMINANTLY INATTENTIVE TYPE: Chronic | ICD-10-CM

## 2018-05-23 NOTE — TELEPHONE ENCOUNTER
Take rx to [facility] and notify patient.      Adderall 20 mg      Last Written Prescription Date:  4/20/18  Last Fill Quantity: 60,   # refills: 0  Last Office Visit: 12/7/17  Future Office visit:    Next 5 appointments (look out 90 days)     Jun 08, 2018 12:20 PM CDT   SHORT with Mary Ann [facility]era [facility]    [address]  [facility] [address]   [phone]                   Routing refill request to provider for review/approval because:  Drug not on the FMG, UMP or  Health refill protocol or controlled substance  Signed CSA on file  Unable to access [facility] at this time.  CINDY Hernandez R.N.

## 2018-05-23 NOTE — TELEPHONE ENCOUNTER
Reason for Call:  Medication or medication refill:    Do you use a [facility]?  Yes  radha of the pharmacy and phone number for the current request:  [facility] [address] ([place]) - [phone]    Name of the medication requested: adderall amphetamine 20 mg    Other request: appt scheduled 6/8/18 at 12:20  Can we leave a detailed message on this number? YES    Phone number patient can be reached at: Cell number on file:    Telephone Information:   Mobile [phone]       Best Time: any    Call taken on 5/23/2018 at 12:09 PM by Maya Garcia

## 2018-05-24 RX ORDER — DEXTROAMPHETAMINE SACCHARATE, AMPHETAMINE ASPARTATE, DEXTROAMPHETAMINE SULFATE AND AMPHETAMINE SULFATE 5; 5; 5; 5 MG/1; MG/1; MG/1; MG/1
20 TABLET ORAL 2 TIMES DAILY
Qty: 60 TABLET | Refills: 0 | Status: SHIPPED | OUTPATIENT
Start: 2018-05-24 | End: 2018-06-08

## 2018-05-24 NOTE — TELEPHONE ENCOUNTER
Rx taken down to [facility] and message left on cell voicemail for pt.  CINDY Hernandez R.N.

## 2018-06-08 ENCOUNTER — OFFICE VISIT (OUTPATIENT)
Dept: INTERNAL MEDICINE | Facility: CLINIC | Age: 28
End: 2018-06-08
Payer: COMMERCIAL

## 2018-06-08 ENCOUNTER — TELEPHONE (OUTPATIENT)
Dept: INTERNAL MEDICINE | Facility: CLINIC | Age: 28
End: 2018-06-08

## 2018-06-08 VITALS
RESPIRATION RATE: 16 BRPM | HEART RATE: 97 BPM | TEMPERATURE: 97 F | DIASTOLIC BLOOD PRESSURE: 50 MMHG | BODY MASS INDEX: 23.95 KG/M2 | WEIGHT: 149 LBS | HEIGHT: 66 IN | SYSTOLIC BLOOD PRESSURE: 98 MMHG

## 2018-06-08 DIAGNOSIS — F90.0 ATTENTION DEFICIT HYPERACTIVITY DISORDER (ADHD), PREDOMINANTLY INATTENTIVE TYPE: Chronic | ICD-10-CM

## 2018-06-08 DIAGNOSIS — Z00.00 ROUTINE GENERAL MEDICAL EXAMINATION AT A HEALTH CARE FACILITY: Primary | ICD-10-CM

## 2018-06-08 PROCEDURE — 99395 PREV VISIT EST AGE 18-39: CPT | Performed by: INTERNAL MEDICINE

## 2018-06-08 RX ORDER — DEXTROAMPHETAMINE SACCHARATE, AMPHETAMINE ASPARTATE, DEXTROAMPHETAMINE SULFATE AND AMPHETAMINE SULFATE 5; 5; 5; 5 MG/1; MG/1; MG/1; MG/1
20 TABLET ORAL 2 TIMES DAILY
Qty: 60 TABLET | Refills: 0 | Status: SHIPPED | OUTPATIENT
Start: 2018-06-25 | End: 2018-09-21

## 2018-06-08 RX ORDER — DEXTROAMPHETAMINE SACCHARATE, AMPHETAMINE ASPARTATE, DEXTROAMPHETAMINE SULFATE AND AMPHETAMINE SULFATE 5; 5; 5; 5 MG/1; MG/1; MG/1; MG/1
20 TABLET ORAL 2 TIMES DAILY
Qty: 60 TABLET | Refills: 0 | Status: SHIPPED | OUTPATIENT
Start: 2018-07-23 | End: 2018-11-09

## 2018-06-08 NOTE — PROGRESS NOTES
Dr Maravilla's note        ASSESSMENT & PLAN:                                                      (Z00.00) Routine general medical examination at a health care facility  (primary encounter diagnosis)  Comment:   Plan:     (F90.0) ADHD, inattentive type  Comment:   Plan: amphetamine-dextroamphetamine (ADDERALL) 20 MG         per tablet, amphetamine-dextroamphetamine         (ADDERALL) 20 MG per tablet               Chief Complaint:                                                        Annual exam    SUBJECTIVE:                                                    History of present illness     No acute complaints  Needs refills for Adderall.  She seldom takes them in weekend because she does not like the way she feels.  I told her that she may want to try with half dose in the afternoon during the weekdays      ROS:   General: Negative for fever, chills, major weight changes, fatigue  Skin: Negative for rashes, abnormal spots  Eyes: Negative for blurred or double vision  ENT/mouth: Negative for sinuses discomfort, earache, sore throat  Respiratory: Negative for cough, wheezes, chronic lung disease  Cardiovascular: Negative for rest or exertional chest pain, shortness of breath, palpitations, leg edema,   Gastrointestinal: Negative for vomiting, abdominal pain, heartburn, blood in stool, diarrhea, constipation  Genitourinary: Negative for urinary frequency, blood in urine, history of kidney stones  Female: Negative for abnormal vaginal bleeding, vaginal discharge  Neuro: Negative for headaches, numbness, tingling, weakness in arms or legs, history of seizure, recent syncope  Psychiatry: Negative for depression, anxiety, suicidal thoughts  Endo: Negative for known thyroid disease, diabetes.  Hemato/Lymph: Negative for nodes, easy bleeding, history of DVT, blood transfusion  Musculoskeletal: Negative for joint swelling, back pain      PMHx: - reviewed  Past Medical History:   Diagnosis Date     Anxiety      Pap smear for  "cervical cancer screening 2008    biopsy taken and ok       PSHx: reviewed  Past Surgical History:   Procedure Laterality Date     NO HISTORY OF SURGERY          Soc Hx: No daily alcohol, no smoking  Social History     Social History     Marital status: Single     Spouse name: N/A     Number of children: N/A     Years of education: N/A     Occupational History     Not on file.     Social History Main Topics     Smoking status: Former Smoker     Packs/day: 0.10     Years: 5.00     Types: Cigarettes     Quit date: 12/2/2012     Smokeless tobacco: Never Used     Alcohol use Yes      Comment: 2 drinks weekly     Drug use: No     Sexual activity: Yes     Partners: Male     Birth control/ protection: IUD     Other Topics Concern     Not on file     Social History Narrative        Fam Hx: reviewed  Family History   Problem Relation Age of Onset     CANCER Mother      thyroid     Hypertension Father      Lipids Father      Breast Cancer Other      Depression Other      Anxiety Disorder Other      MENTAL ILLNESS Other      Substance Abuse Other          Screening: reviewed      All: reviewed    Meds: reviewed  Current Outpatient Prescriptions   Medication Sig Dispense Refill     amphetamine-dextroamphetamine (ADDERALL) 20 MG per tablet Take 1 tablet (20 mg) by mouth 2 times daily 60 tablet 0     levonorgestrel (MIRENA, 52 MG,) 20 MCG/24HR IUD 1 each (20 mcg) by Intrauterine route once for 1 dose 1 each 0       OBJECTIVE:                                                    Physical Exam :  Blood pressure 98/50, pulse 97, temperature 97  F (36.1  C), temperature source Oral, resp. rate 16, height 5' 6\" (1.676 m), weight 149 lb (67.6 kg), not currently breastfeeding.     NAD, appears comfortable  Skin clear, no rashes  HEENT: PERRLA, EOMI, anicteric sclera, pink conjunctiva, external ears appear normal, bilateral tympanic membranes clinically normal, oropharynx normal color.  Neck: supple, no JVD,  no thyroidmegaly  Lymph nodes " non palpable in the cervical, supraclavicular axillaries, inguinal areas  Chest: clear to auscultation with good respiratory effort  Cardiac: S1S2, RRR, no mgr appreciated  Abdomen: soft, not tender, not distended, audible bowel sound, no hepatosplenomegaly, no palpable masses, no abdominal bruits  Extremities: no cyanosis, clubbing or edema.   Neuro: A, Ox3, no focal signs.  Breast exam in supine and erect position: they are symmetrical, no skin changes, no tenderness or nodes on palpation. Nipples are erect, no skin lesions, no discharge on pressure.    Pelvic exam: deferred, no symptoms, no hx of abnormal pap         Mary Ann Maravilla MD  Internal Medicine

## 2018-08-15 ENCOUNTER — NURSE TRIAGE (OUTPATIENT)
Dept: NURSING | Facility: CLINIC | Age: 28
End: 2018-08-15

## 2018-08-15 NOTE — TELEPHONE ENCOUNTER
"  Reason for Disposition    [1] MODERATE pain (e.g., interferes with normal activities, limping) AND [2] present > 3 days    Additional Information    Negative: Looks like a broken bone or dislocated joint (e.g., crooked or deformed)    Negative: Sounds like a life-threatening emergency to the triager    Negative: [1] SEVERE pain (e.g., excruciating, unable to do any normal activities) AND [2] fever    Negative: Can't stand (bear weight) or walk    Negative: [1] Red area or streak AND [2] fever    Negative: Patient sounds very sick or weak to the triager    Negative: [1] SEVERE pain (e.g., excruciating, unable to do any normal activities) AND [2] not improved after 2 hours of pain medicine    Negative: [1] Looks infected (spreading redness, pus) AND [2] large red area (> 2 in. or 5 cm)    Negative: [1] Painful rash AND [2] multiple small blisters grouped together (i.e., dermatomal distribution or \"band\" or \"stripe\")    Negative: Looks like a boil, infected sore, or deep ulcer    Negative: [1] Localized rash is very painful AND [2] no fever    Negative: [1] Redness of the skin AND [2] no fever    Negative: Numbness in a leg or foot (i.e., loss of sensation)    Protocols used: HIP PAIN-ADULT-    "

## 2018-08-15 NOTE — TELEPHONE ENCOUNTER
Left hip starting popping on Monday August 13th and is painful.  Gerri did not have any injury.  Denies fever.

## 2018-08-16 ENCOUNTER — OFFICE VISIT (OUTPATIENT)
Dept: FAMILY MEDICINE | Facility: CLINIC | Age: 28
End: 2018-08-16
Payer: COMMERCIAL

## 2018-08-16 VITALS
DIASTOLIC BLOOD PRESSURE: 57 MMHG | BODY MASS INDEX: 23.73 KG/M2 | HEART RATE: 88 BPM | SYSTOLIC BLOOD PRESSURE: 94 MMHG | TEMPERATURE: 97.5 F | WEIGHT: 147 LBS | OXYGEN SATURATION: 98 %

## 2018-08-16 DIAGNOSIS — M25.552 HIP PAIN, LEFT: Primary | ICD-10-CM

## 2018-08-16 DIAGNOSIS — M54.32 SCIATICA OF LEFT SIDE: ICD-10-CM

## 2018-08-16 PROCEDURE — 99213 OFFICE O/P EST LOW 20 MIN: CPT | Performed by: NURSE PRACTITIONER

## 2018-08-16 RX ORDER — DICLOFENAC SODIUM 75 MG/1
75 TABLET, DELAYED RELEASE ORAL 2 TIMES DAILY PRN
Qty: 30 TABLET | Refills: 1 | Status: SHIPPED | OUTPATIENT
Start: 2018-08-16 | End: 2019-03-18

## 2018-08-16 RX ORDER — CYCLOBENZAPRINE HCL 5 MG
5-10 TABLET ORAL 3 TIMES DAILY PRN
Qty: 30 TABLET | Refills: 1 | Status: SHIPPED | OUTPATIENT
Start: 2018-08-16 | End: 2019-03-13

## 2018-08-16 ASSESSMENT — PAIN SCALES - GENERAL: PAINLEVEL: MODERATE PAIN (4)

## 2018-08-16 NOTE — PROGRESS NOTES
"  SUBJECTIVE:   Gerri Rosado is a 28 year old female who presents to clinic today for the following health issues:      Joint Pain    Onset: 3 days    Description:   Location: left hip  Character: Sharp    Intensity: moderate    Progression of Symptoms: same    Accompanying Signs & Symptoms:  Other symptoms: none    History:   Previous similar pain: no       Precipitating factors:   Trauma or overuse: no     Alleviating factors:  Improved by: rest/inactivity and stretching    Therapies Tried and outcome:     Acute onset left hip pain 3 days ago  Pain in left buttocks  Pain with walking   at restaurant, unable to work d/t pain  No pain at rest, except \"sore\" at end of day  Took 800 ibuprofen which provided some relief  Took tylenol in between doses which didn't help  Denies paresthesias          Problem list and histories reviewed & adjusted, as indicated.  Additional history: none    Patient Active Problem List   Diagnosis     CARDIOVASCULAR SCREENING; LDL GOAL LESS THAN 160     Anxiety     Concentration deficit     ADHD, inattentive type     Controlled substance agreement signed- reviewed-no concerns 4/19/18     IUD (intrauterine device) in place     Past Surgical History:   Procedure Laterality Date     NO HISTORY OF SURGERY         Social History   Substance Use Topics     Smoking status: Former Smoker     Packs/day: 0.10     Years: 5.00     Types: Cigarettes     Quit date: 12/2/2012     Smokeless tobacco: Never Used     Alcohol use Yes      Comment: 2 drinks weekly     Family History   Problem Relation Age of Onset     Cancer Mother      thyroid     Hypertension Father      Lipids Father      Breast Cancer Other      Depression Other      Anxiety Disorder Other      Mental Illness Other      Substance Abuse Other            Reviewed and updated as needed this visit by clinical staff  Tobacco  Allergies  Meds  Med Hx  Surg Hx  Fam Hx  Soc Hx      Reviewed and updated as needed this visit by " Provider         ROS:  Constitutional, HEENT, cardiovascular, pulmonary, gi and gu systems are negative, except as otherwise noted.    OBJECTIVE:     BP 94/57 (BP Location: Right arm, Patient Position: Chair, Cuff Size: Adult Regular)  Pulse 88  Temp 97.5  F (36.4  C) (Oral)  Wt 147 lb (66.7 kg)  SpO2 98%  Breastfeeding? No  BMI 23.73 kg/m2  Body mass index is 23.73 kg/(m^2).  GENERAL: healthy, alert and no distress  RESP: lungs clear to auscultation - no rales, rhonchi or wheezes  CV: regular rate and rhythm, normal S1 S2, no S3 or S4, no murmur, click or rub, no peripheral edema and peripheral pulses strong  MS: No tenderness to greater trochanter. Tenderness to left lateral buttocks. Abduction limited to 30 degrees d/t pain. Flexion and extension intact. Gait slow but intact. Lower extremity strength 5/5 and symmetric. No tenderness to lumbar spinous processes or paraspinal region   SKIN: no suspicious lesions or rashes  NEURO: Normal strength and tone, mentation intact and speech normal    Diagnostic Test Results:  none     ASSESSMENT/PLAN:       ICD-10-CM    1. Hip pain, left M25.552    2. Sciatica of left side M54.32 cyclobenzaprine (FLEXERIL) 5 MG tablet     diclofenac (VOLTAREN) 75 MG EC tablet     Consistent with sciatica  Advised it is often self limiting. Treat with NSAID and muscle relaxant. Warned of sedating potential. Excused from work for 2 days. Recommend ice, gentle walking. Reviewed comfortable positions. If not improving, consider steroid burst though I suspect it won't be necessary. Also consider physical therapy if slow to improve.   See Patient Instructions    QUAN Salas [facility]

## 2018-08-16 NOTE — PATIENT INSTRUCTIONS
"  Take diclofenac twice daily  Do not take with other NSAIDs (ibuprofen, Naproxen, etc)  Call clinic if not improving by next week and we can try a different medication  The Flexeril may make you drowsy. Do not take before driving or working  Sciatica    Sciatica (\"Lumbar Radiculopathy\") causes a pain that spreads from the lower back down into the buttock, hip and leg. Sometimes leg pain can occur without any back pain. Sciatica is due to irritation or pressure on a spinal nerve as it comes out of the spinal canal. This is most often due to a bulge or rupture of a nearby spinal disk (the cartilage cushion between each spinal bone), which presses on a nearby nerve. Other causes include spinal stenosis (narrowing of the spinal canal) and spasm of the piriformis muscle (a muscle in the buttocks that the sciatic nerve passes through).  Sciatica may begin after a sudden twisting/bending force (such as in a car accident), or sometimes after a simple awkward movement. In either case, muscle spasm is commonly present and contributes to the pain.  The diagnosis of sciatica is made from the symptoms and physical exam. Unless you had a physical injury (such as a car accident or fall), X-rays are usually not ordered for the initial evaluation of sciatica because the nerves and disks cannot be seen on an X-ray. Most sciatica (80-90%) gets better with time.  What can I do about my low back pain?  There are three main things you can do to ease low back pain and help it go away.    Use heat or cold packs.    Take medicine as directed.    Use positions, movements and exercises. Stay active! Too much rest can make your symptoms worse.  Using heat or cold packs  Try cold packs or gentle heat to ease your pain. Use whichever gives the most relief. Apply the cold pack or heat for 15 minutes at a time, as often as needed.  Taking medicine  If taking over-the-counter medicine:    Take ibuprofen (Advil, Motrin) 600 mg. three times a day as " needed for pain.  OR  ? Take Aleve (naproxen sodium) 220 to 440 mg. two times a day as needed for pain  If your doctor prescribed a muscle relaxant (cyclobenzapine 10 mg.):    Take one half ( ) to 1 tablet at bedtime    Do not drive when taking this medicine. This drug may make you sleepy.  Using positions, movements and exercises  Research tells us that moving your joints and muscles can help you recover from back pain. Such activity should be simple and gentle.  Use the positions below as well as walking to help relieve your discomfort. Try taking a short walk every 3 to 4 hours during the day. Walk for a few minutes inside your home or take longer walks outside, on a treadmill or at a mall. Slowly increase the amount of time you walk. Expect discomfort when you begin, but it should lessen as your back starts to recover.  Finding a position that is comfortable  When your back pain is new, you may find that certain positions will ease your pain. Gently try each of the following positions until you find one that eases your pain. Once you find a position of comfort, use it as often as you like while you recover. Return to your daily routine as soon as possible.     Lie on your back with your legs bent. You can do this by placing a pillow under your knees or lie on the floor and rest your lower legs on the seat of a chair.    Lie on your side with your knees bent and place a pillow between your knees.    Lie on your stomach over pillows.  When should I call my doctor?  Your back pain should improve over the first couple of weeks. As it improves, you should be able to return to your normal activities. But call your doctor if:    You have a sudden change in your ability to control? your bladder or bowels.    You begin to feel tingling in your groin or legs.    The pain spreads down your leg and into your foot.    Your toes, feet or leg muscles begin to feel weak.    You feel generally unwell or sick.    Your pain gets  worse.    2244-8872 [employer]. [address] [place] [address], [place] [address]. All rights reserved. This information is not intended as a substitute for professional medical care. Always follow your healthcare professional's instructions.  This information has been modified by your health care provider with permission from the publisher.

## 2018-08-16 NOTE — MR AVS SNAPSHOT
"              After Visit Summary   8/16/2018    Gerri Rosado    MRN: [id number]           Patient Information     Date Of Birth          1990        Visit Information        Provider Department      8/16/2018 8:40 AM Pao Lira APRN [facility]        Today's Diagnoses     Hip pain, left    -  1    Sciatica of left side          Care Instructions      Take diclofenac twice daily  Do not take with other NSAIDs (ibuprofen, Naproxen, etc)  Call clinic if not improving by next week and we can try a different medication  The Flexeril may make you drowsy. Do not take before driving or working  Sciatica    Sciatica (\"Lumbar Radiculopathy\") causes a pain that spreads from the lower back down into the buttock, hip and leg. Sometimes leg pain can occur without any back pain. Sciatica is due to irritation or pressure on a spinal nerve as it comes out of the spinal canal. This is most often due to a bulge or rupture of a nearby spinal disk (the cartilage cushion between each spinal bone), which presses on a nearby nerve. Other causes include spinal stenosis (narrowing of the spinal canal) and spasm of the piriformis muscle (a muscle in the buttocks that the sciatic nerve passes through).  Sciatica may begin after a sudden twisting/bending force (such as in a car accident), or sometimes after a simple awkward movement. In either case, muscle spasm is commonly present and contributes to the pain.  The diagnosis of sciatica is made from the symptoms and physical exam. Unless you had a physical injury (such as a car accident or fall), X-rays are usually not ordered for the initial evaluation of sciatica because the nerves and disks cannot be seen on an X-ray. Most sciatica (80-90%) gets better with time.  What can I do about my low back pain?  There are three main things you can do to ease low back pain and help it go away.    Use heat or cold packs.    Take medicine as " directed.    Use positions, movements and exercises. Stay active! Too much rest can make your symptoms worse.  Using heat or cold packs  Try cold packs or gentle heat to ease your pain. Use whichever gives the most relief. Apply the cold pack or heat for 15 minutes at a time, as often as needed.  Taking medicine  If taking over-the-counter medicine:    Take ibuprofen (Advil, Motrin) 600 mg. three times a day as needed for pain.  OR  ? Take Aleve (naproxen sodium) 220 to 440 mg. two times a day as needed for pain  If your doctor prescribed a muscle relaxant (cyclobenzapine 10 mg.):    Take one half ( ) to 1 tablet at bedtime    Do not drive when taking this medicine. This drug may make you sleepy.  Using positions, movements and exercises  Research tells us that moving your joints and muscles can help you recover from back pain. Such activity should be simple and gentle.  Use the positions below as well as walking to help relieve your discomfort. Try taking a short walk every 3 to 4 hours during the day. Walk for a few minutes inside your home or take longer walks outside, on a treadmill or at a mall. Slowly increase the amount of time you walk. Expect discomfort when you begin, but it should lessen as your back starts to recover.  Finding a position that is comfortable  When your back pain is new, you may find that certain positions will ease your pain. Gently try each of the following positions until you find one that eases your pain. Once you find a position of comfort, use it as often as you like while you recover. Return to your daily routine as soon as possible.     Lie on your back with your legs bent. You can do this by placing a pillow under your knees or lie on the floor and rest your lower legs on the seat of a chair.    Lie on your side with your knees bent and place a pillow between your knees.    Lie on your stomach over pillows.  When should I call my doctor?  Your back pain should improve over the  first couple of weeks. As it improves, you should be able to return to your normal activities. But call your doctor if:    You have a sudden change in your ability to control? your bladder or bowels.    You begin to feel tingling in your groin or legs.    The pain spreads down your leg and into your foot.    Your toes, feet or leg muscles begin to feel weak.    You feel generally unwell or sick.    Your pain gets worse.    7520-9970 [employer]. [address] [place] [address]. All rights reserved. This information is not intended as a substitute for professional medical care. Always follow your healthcare professional's instructions.  This information has been modified by your health care provider with permission from the publisher.                Follow-ups after your visit        Who to contact     If you have questions or need follow up information about today's clinic visit or your schedule please contact [facility] directly at [phone].  Normal or non-critical lab and imaging results will be communicated to you by MyChart, letter or phone within 4 business days after the clinic has received the results. If you do not hear from us within 7 days, please contact the clinic through MyChart or phone. If you have a critical or abnormal lab result, we will notify you by phone as soon as possible.  Submit refill requests through [employer] or call your pharmacy and they will forward the refill request to us. Please allow 3 business days for your refill to be completed.          Additional Information About Your Visit        Care EveryWhere ID     This is your Care EveryWhere ID. This could be used by other organizations to access your [facility] medical records  [id number]        Your Vitals Were     Pulse Temperature Pulse Oximetry Breastfeeding? BMI (Body Mass Index)       88 97.5  F (36.4  C) (Oral) 98% No 23.73 kg/m2        Blood Pressure from Last 3 Encounters:    08/16/18 94/57   06/08/18 98/50   05/09/18 100/60    Weight from Last 3 Encounters:   08/16/18 147 lb (66.7 kg)   06/08/18 149 lb (67.6 kg)   05/09/18 147 lb 14.4 oz (67.1 kg)              Today, you had the following     No orders found for display         Today's Medication Changes          These changes are accurate as of 8/16/18  9:10 AM.  If you have any questions, ask your nurse or doctor.               Start taking these medicines.        Dose/Directions    cyclobenzaprine 5 MG tablet   Commonly known as:  FLEXERIL   Used for:  Sciatica of left side   Started by:  Pao Lira APRN CNP        Dose:  5-10 mg   Take 1-2 tablets (5-10 mg) by mouth 3 times daily as needed for muscle spasms   Quantity:  30 tablet   Refills:  1       diclofenac 75 MG EC tablet   Commonly known as:  VOLTAREN   Used for:  Sciatica of left side   Started by:  Pao Lira APRN CNP        Dose:  75 mg   Take 1 tablet (75 mg) by mouth 2 times daily as needed for moderate pain   Quantity:  30 tablet   Refills:  1            Where to get your medicines      These medications were sent to [facility], [facility]. \[facility].\"" [address]  [address], [facility] [address]     Phone:  [phone]     cyclobenzaprine 5 MG tablet    diclofenac 75 MG EC tablet                Primary Care Provider Office Phone # Fax #    Mary Ann Aguilera [phone]       [address] [facility] [address]        Equal Access to Services     [facility] AH: Hadii aad ku hadasho Sojuan pablo, waaxda luqadaha, qaybta kaalmada adeegalexandre, france frank. So [facility] [phone].    ATENCIÓN: Si habla español, tiene a koenig disposición servicios gratuitos de asistencia lingüística. Llame al [phone].    We comply with applicable federal civil rights laws and [place] laws. We do not discriminate on the basis of race, color, national origin,  age, disability, sex, sexual orientation, or gender identity.            Thank you!     Thank you for choosing [facility]  for your care. Our goal is always to provide you with excellent care. Hearing back from our patients is one way we can continue to improve our services. Please take a few minutes to complete the written survey that you may receive in the mail after your visit with us. Thank you!             Your Updated Medication List - Protect others around you: Learn how to safely use, store and throw away your medicines at www.disposemymeds.org.          This list is accurate as of 8/16/18  9:10 AM.  Always use your most recent med list.                   Brand Name Dispense Instructions for use Diagnosis    * amphetamine-dextroamphetamine 20 MG per tablet    ADDERALL    60 tablet    Take 1 tablet (20 mg) by mouth 2 times daily    Attention deficit hyperactivity disorder (ADHD), predominantly inattentive type       * amphetamine-dextroamphetamine 20 MG per tablet    ADDERALL    60 tablet    Take 1 tablet (20 mg) by mouth 2 times daily    Attention deficit hyperactivity disorder (ADHD), predominantly inattentive type       cyclobenzaprine 5 MG tablet    FLEXERIL    30 tablet    Take 1-2 tablets (5-10 mg) by mouth 3 times daily as needed for muscle spasms    Sciatica of left side       diclofenac 75 MG EC tablet    VOLTAREN    30 tablet    Take 1 tablet (75 mg) by mouth 2 times daily as needed for moderate pain    Sciatica of left side       levonorgestrel 20 MCG/24HR IUD    MIRENA (52 MG)    1 each    1 each (20 mcg) by Intrauterine route once for 1 dose    Encounter for insertion of intrauterine contraceptive device (IUD)       * Notice:  This list has 2 medication(s) that are the same as other medications prescribed for you. Read the directions carefully, and ask your doctor or other care provider to review them with you.

## 2018-08-16 NOTE — LETTER
[address] [address]  Phone: [phone]  Fax: [phone]    August 16, 2018        Gerri Rosado  [address] [facility] [address]          To whom it may concern:    RE: Gerri Rosado    Patient was seen and treated today at our clinic. Please excuse her absences from work 8/16/18-8/17/18.     Please contact me for questions or concerns.      Sincerely,        QUAN Salas CNP

## 2018-09-21 DIAGNOSIS — F90.0 ATTENTION DEFICIT HYPERACTIVITY DISORDER (ADHD), PREDOMINANTLY INATTENTIVE TYPE: Chronic | ICD-10-CM

## 2018-09-21 RX ORDER — DEXTROAMPHETAMINE SACCHARATE, AMPHETAMINE ASPARTATE, DEXTROAMPHETAMINE SULFATE AND AMPHETAMINE SULFATE 5; 5; 5; 5 MG/1; MG/1; MG/1; MG/1
20 TABLET ORAL 2 TIMES DAILY
Qty: 60 TABLET | Refills: 0 | Status: SHIPPED | OUTPATIENT
Start: 2018-09-21 | End: 2019-02-22

## 2018-09-21 NOTE — TELEPHONE ENCOUNTER
ADDERALL      Last Written Prescription Date:  07/23/18  Last Fill Quantity: 60,   # refills: 0  Last Office Visit: 06/08/18  Future Office visit:       Routing refill request to provider for review/approval because:  Drug not on the [facility], P or [facility] refill protocol or controlled substance

## 2018-11-08 ENCOUNTER — TELEPHONE (OUTPATIENT)
Dept: INTERNAL MEDICINE | Facility: CLINIC | Age: 28
End: 2018-11-08

## 2018-11-08 DIAGNOSIS — F90.0 ATTENTION DEFICIT HYPERACTIVITY DISORDER (ADHD), PREDOMINANTLY INATTENTIVE TYPE: Chronic | ICD-10-CM

## 2018-11-09 RX ORDER — DEXTROAMPHETAMINE SACCHARATE, AMPHETAMINE ASPARTATE, DEXTROAMPHETAMINE SULFATE AND AMPHETAMINE SULFATE 5; 5; 5; 5 MG/1; MG/1; MG/1; MG/1
20 TABLET ORAL 2 TIMES DAILY
Qty: 60 TABLET | Refills: 0 | Status: SHIPPED | OUTPATIENT
Start: 2018-11-09 | End: 2018-12-20

## 2018-11-09 NOTE — TELEPHONE ENCOUNTER
Request for adderall.  Last OV 6/8/18, last filled 9/21/18 qty 60.    Routing refill request to provider for review/approval because:  Drug not on the [facility] refill protocol     To take to pharmacy

## 2018-11-09 NOTE — TELEPHONE ENCOUNTER
Reason for Call:  Medication or medication refill:    Do you use a [facility]?  Name of the pharmacy and phone number for the current request:  [place] [address] [facility] (On license of [facility]) - [phone]    Name of the medication requested: Adderrall 20 MG    Other request: none    Can we leave a detailed message on this number? YES    Phone number patient can be reached at: Cell number on file:    Telephone Information:   Mobile [phone]       Best Time: any    Call taken on 11/8/2018 at 6:30 PM by Maya Garcia

## 2018-12-18 DIAGNOSIS — Z79.899 CONTROLLED SUBSTANCE AGREEMENT SIGNED: Primary | ICD-10-CM

## 2018-12-18 DIAGNOSIS — F90.0 ATTENTION DEFICIT HYPERACTIVITY DISORDER (ADHD), PREDOMINANTLY INATTENTIVE TYPE: Chronic | ICD-10-CM

## 2018-12-18 NOTE — TELEPHONE ENCOUNTER
Reason for Call:  Medication or medication refill:    Do you use a [facility]?  Name of the pharmacy and phone number for the current request:  [facility]    Name of the medication requested: adderall    Other request: no    Can we leave a detailed message on this number? YES    Phone number patient can be reached at: Home number on file [phone] (home)    Best Time: any    Call taken on 12/18/2018 at 12:48 PM by Estefany Jarvis

## 2018-12-20 RX ORDER — DEXTROAMPHETAMINE SACCHARATE, AMPHETAMINE ASPARTATE, DEXTROAMPHETAMINE SULFATE AND AMPHETAMINE SULFATE 5; 5; 5; 5 MG/1; MG/1; MG/1; MG/1
20 TABLET ORAL 2 TIMES DAILY
Qty: 60 TABLET | Refills: 0 | Status: SHIPPED | OUTPATIENT
Start: 2018-12-20 | End: 2019-01-18

## 2018-12-20 NOTE — TELEPHONE ENCOUNTER
Rx to go to [facility]    Adderall 20 mg      Last Written Prescription Date:  11/9/18  Last Fill Quantity: 60,   # refills: 0  Last Office Visit: 6/8/18  Future Office visit:       Routing refill request to provider for review/approval because:  Drug not on the [facility], [facility] or  Health refill protocol or controlled substance    [place] reviewed:  reviewed- no concerns    [place]  [email or web address]

## 2019-01-18 DIAGNOSIS — F90.0 ATTENTION DEFICIT HYPERACTIVITY DISORDER (ADHD), PREDOMINANTLY INATTENTIVE TYPE: Chronic | ICD-10-CM

## 2019-01-18 RX ORDER — DEXTROAMPHETAMINE SACCHARATE, AMPHETAMINE ASPARTATE, DEXTROAMPHETAMINE SULFATE AND AMPHETAMINE SULFATE 5; 5; 5; 5 MG/1; MG/1; MG/1; MG/1
20 TABLET ORAL 2 TIMES DAILY
Qty: 60 TABLET | Refills: 0 | Status: SHIPPED | OUTPATIENT
Start: 2019-01-18 | End: 2019-02-22

## 2019-01-18 NOTE — TELEPHONE ENCOUNTER
Reason for Call:  Medication or medication refill:    Do you use a [facility]?  Name of the pharmacy and phone number for the current request:  [facility] [place]     Name of the medication requested: Adderall 20 MG 2x per day    Other request: none     Can we leave a detailed message on this number? YES    Phone number patient can be reached at: Cell number on file:    Telephone Information:   Mobile [phone]       Best Time: any    Call taken on 1/18/2019 at 9:55 AM by Maya Garcia

## 2019-01-18 NOTE — TELEPHONE ENCOUNTER
Please place prescription in the [facility] pharmacy folder.    Adderall      Last Written Prescription Date:  12-20-18  Last Fill Quantity: 60,   # refills: 0  Last Office Visit: 6-8-18  Future Office visit:       Routing refill request to provider for review/approval because:  Drug not on the [facility], [facility] or  Health refill protocol or controlled substance    Signed CSA form in chart.    [place] reviewed:  reviewed- no concerns    [place]:  [email or web address]    Please advise, thanks.

## 2019-02-22 DIAGNOSIS — F90.0 ATTENTION DEFICIT HYPERACTIVITY DISORDER (ADHD), PREDOMINANTLY INATTENTIVE TYPE: Chronic | ICD-10-CM

## 2019-02-22 RX ORDER — DEXTROAMPHETAMINE SACCHARATE, AMPHETAMINE ASPARTATE, DEXTROAMPHETAMINE SULFATE AND AMPHETAMINE SULFATE 5; 5; 5; 5 MG/1; MG/1; MG/1; MG/1
20 TABLET ORAL 2 TIMES DAILY
Qty: 60 TABLET | Refills: 0 | Status: SHIPPED | OUTPATIENT
Start: 2019-02-22 | End: 2019-03-18

## 2019-02-22 NOTE — TELEPHONE ENCOUNTER
Controlled Substance Refill Request for Adderall  Problem List Complete:  No     PROVIDER TO CONSIDER COMPLETION OF PROBLEM LIST AND OVERVIEW/CONTROLLED SUBSTANCE AGREEMENT    Last Written Prescription Date:  1/18/19  Last Fill Quantity: 60,   # refills: 0    THE MOST RECENT OFFICE VISIT MUST BE WITHIN THE PAST 3 MONTHS. AT LEAST ONE FACE TO FACE VISIT MUST OCCUR EVERY 6 MONTHS. ADDITIONAL VISITS CAN BE VIRTUAL.      Last Office Visit with [facility] primary care provider: 6/8/18    Future Office visit:     Controlled substance agreement:   Encounter-Level CSA - 04/15/2016:    Controlled Substance Agreement - Scan on 4/21/2016 10:03 AM: [place] Controlled Substance Agreement, 4/15/16 (below)       Patient-Level CSA:    There are no patient-level csa.         Last Urine Drug Screen: No results found for: CDAUT, No results found for: COMDAT, No results found for: THC13, PCP13, COC13, MAMP13, OPI13, AMP13, BZO13, TCA13, MTD13, BAR13, OXY13, PPX13, BUP13     Processing:  Staff will hand deliver Rx to on-site pharmacy     [email or web address]       checked in past 3 months?  Yes no concerns 2/22/19

## 2019-02-22 NOTE — TELEPHONE ENCOUNTER
Reason for Call:  Medication or medication refill:    Do you use a [facility]?  Name of the pharmacy and phone number for the current request:  [place] [address] [facility] - [phone]    Name of the medication requested: amphetamine-dextroamphetamine (ADDERALL) 20 MG per tablet    Other request: Pt. Has 4 days left    Can we leave a detailed message on this number? YES    Phone number patient can be reached at: Cell number on file:    Telephone Information:   Mobile [phone]       Best Time: Any    Call taken on 2/22/2019 at 2:55 PM by Bret West

## 2019-03-13 ENCOUNTER — OFFICE VISIT (OUTPATIENT)
Dept: OBGYN | Facility: CLINIC | Age: 29
End: 2019-03-13
Payer: COMMERCIAL

## 2019-03-13 VITALS
SYSTOLIC BLOOD PRESSURE: 98 MMHG | DIASTOLIC BLOOD PRESSURE: 64 MMHG | HEIGHT: 66 IN | BODY MASS INDEX: 23.78 KG/M2 | WEIGHT: 148 LBS

## 2019-03-13 DIAGNOSIS — N89.8 VAGINAL DISCHARGE: ICD-10-CM

## 2019-03-13 DIAGNOSIS — Z11.51 SPECIAL SCREENING EXAMINATION FOR HUMAN PAPILLOMAVIRUS (HPV): ICD-10-CM

## 2019-03-13 DIAGNOSIS — Z30.430 ENCOUNTER FOR INSERTION OF INTRAUTERINE CONTRACEPTIVE DEVICE: Primary | ICD-10-CM

## 2019-03-13 DIAGNOSIS — N64.4 BREAST TENDERNESS: ICD-10-CM

## 2019-03-13 DIAGNOSIS — Z12.4 SCREENING FOR CERVICAL CANCER: ICD-10-CM

## 2019-03-13 LAB
HCG, QUAL URINE: NEGATIVE
SPECIMEN SOURCE: NORMAL
WET PREP SPEC: NORMAL

## 2019-03-13 PROCEDURE — 84703 CHORIONIC GONADOTROPIN ASSAY: CPT | Performed by: ADVANCED PRACTICE MIDWIFE

## 2019-03-13 PROCEDURE — G0145 SCR C/V CYTO,THINLAYER,RESCR: HCPCS | Performed by: ADVANCED PRACTICE MIDWIFE

## 2019-03-13 PROCEDURE — 87210 SMEAR WET MOUNT SALINE/INK: CPT | Performed by: ADVANCED PRACTICE MIDWIFE

## 2019-03-13 PROCEDURE — G0476 HPV COMBO ASSAY CA SCREEN: HCPCS | Performed by: ADVANCED PRACTICE MIDWIFE

## 2019-03-13 PROCEDURE — 99213 OFFICE O/P EST LOW 20 MIN: CPT | Performed by: ADVANCED PRACTICE MIDWIFE

## 2019-03-13 PROCEDURE — 87591 N.GONORRHOEAE DNA AMP PROB: CPT | Performed by: ADVANCED PRACTICE MIDWIFE

## 2019-03-13 PROCEDURE — 87491 CHLMYD TRACH DNA AMP PROBE: CPT | Performed by: ADVANCED PRACTICE MIDWIFE

## 2019-03-13 ASSESSMENT — MIFFLIN-ST. JEOR: SCORE: 1408.07

## 2019-03-13 ASSESSMENT — PATIENT HEALTH QUESTIONNAIRE - PHQ9: 5. POOR APPETITE OR OVEREATING: SEVERAL DAYS

## 2019-03-13 ASSESSMENT — ANXIETY QUESTIONNAIRES
7. FEELING AFRAID AS IF SOMETHING AWFUL MIGHT HAPPEN: NOT AT ALL
6. BECOMING EASILY ANNOYED OR IRRITABLE: MORE THAN HALF THE DAYS
3. WORRYING TOO MUCH ABOUT DIFFERENT THINGS: SEVERAL DAYS
IF YOU CHECKED OFF ANY PROBLEMS ON THIS QUESTIONNAIRE, HOW DIFFICULT HAVE THESE PROBLEMS MADE IT FOR YOU TO DO YOUR WORK, TAKE CARE OF THINGS AT HOME, OR GET ALONG WITH OTHER PEOPLE: NOT DIFFICULT AT ALL
5. BEING SO RESTLESS THAT IT IS HARD TO SIT STILL: NOT AT ALL
1. FEELING NERVOUS, ANXIOUS, OR ON EDGE: SEVERAL DAYS
2. NOT BEING ABLE TO STOP OR CONTROL WORRYING: SEVERAL DAYS
GAD7 TOTAL SCORE: 6

## 2019-03-13 NOTE — PROGRESS NOTES
"  SUBJECTIVE:                                                   Gerri Rosado is a 28 year old who presents to clinic today for the following health issue(s):  Patient presents with:  IUD: Breast swollen, vaginal odor, itching, dryness       HPI:  Gerri presents reporting white vaginal discharge with odor. She has mild pain with intercourse and some irregular spotting. She has noticed these symptoms for a \"few weeks.\" She has had a Mirena IUD for 1 year. She has not checked for her strings. Happy with her IUD. She also reports breast tenderness starting the same time as her other symptoms. Denies any skin changes or masses. She is due for a pap and requests STI screening. UPT in clinic is negative.     No LMP recorded (lmp unknown). Patient is not currently having periods (Reason: IUD).  Menstrual History: amenorrhea due to IUD, some irregular spotting.   Patient is sexually active  .  Using IUD for contraception.   Health maintenance updated:  yes  STI infx testing offered:  Accepted    Last PHQ-9 score on record =   PHQ-9 SCORE 2014   PHQ-9 Total Score 3     Last GAD7 score on record =   NELSON-7 SCORE 3/13/2019   Total Score -   Total Score 6     Alcohol Score = 0    Problem list and histories reviewed & adjusted, as indicated.  Additional history: as documented.    Patient Active Problem List   Diagnosis     CARDIOVASCULAR SCREENING; LDL GOAL LESS THAN 160     Anxiety     Concentration deficit     ADHD, inattentive type     Controlled substance agreement signed- reviewed-no concerns 19     IUD (intrauterine device) in place     Past Surgical History:   Procedure Laterality Date     NO HISTORY OF SURGERY        Social History     Tobacco Use     Smoking status: Former Smoker     Packs/day: 0.10     Years: 5.00     Pack years: 0.50     Types: Cigarettes     Last attempt to quit: 2012     Years since quittin.2     Smokeless tobacco: Never Used   Substance Use Topics     Alcohol use: Yes " "    Comment: 2 drinks weekly      Problem (# of Occurrences) Relation (Name,Age of Onset)    Anxiety Disorder (1) Other    Breast Cancer (1) Other    Cancer (1) Mother: thyroid    Depression (1) Other    Hypertension (1) Father    Lipids (1) Father    Mental Illness (1) Other    Substance Abuse (1) Other            Current Outpatient Medications   Medication Sig     amphetamine-dextroamphetamine (ADDERALL) 20 MG tablet Take 1 tablet (20 mg) by mouth 2 times daily     diclofenac (VOLTAREN) 75 MG EC tablet Take 1 tablet (75 mg) by mouth 2 times daily as needed for moderate pain     levonorgestrel (MIRENA, 52 MG,) 20 MCG/24HR IUD 1 each (20 mcg) by Intrauterine route once for 1 dose     No current facility-administered medications for this visit.      Allergies   Allergen Reactions     No Known Allergies        ROS:  CONSTITUTIONAL: NEGATIVE for fever, chills, change in weight  INTEGUMENTARU/SKIN: NEGATIVE for worrisome rashes, moles or lesions  EYES: NEGATIVE for vision changes or irritation  ENT: NEGATIVE for ear, mouth and throat problems  RESP: NEGATIVE for significant cough or SOB  BREAST: NEGATIVE for masses or discharge, positive for bilateral tenderness.   CV: NEGATIVE for chest pain, palpitations or peripheral edema  GI: NEGATIVE for nausea, abdominal pain, heartburn, or change in bowel habits  : Positive for vaginal discharge with odor.  MUSCULOSKELETAL: NEGATIVE for significant arthralgias or myalgia  NEURO: NEGATIVE for weakness, dizziness or paresthesias  PSYCHIATRIC: NEGATIVE for changes in mood or affect    OBJECTIVE:     BP 98/64 (BP Location: Right arm, Patient Position: Sitting, Cuff Size: Adult Regular)   Ht 1.676 m (5' 6\")   Wt 67.1 kg (148 lb)   LMP  (LMP Unknown)   BMI 23.89 kg/m    Body mass index is 23.89 kg/m .    PHYSICAL EXAM:  Constitutional:  Appearance: Well nourished, well developed alert, in no acute distress  Breasts:  Inspection of Breasts:  No lymphadenopathy present; Palpation " of Breasts and Axillae:  No masses present on palpation, no breast tenderness Axillary Lymph Nodes:  No lymphadenopathy present  Skin:General Inspection:  No rashes present, no lesions present, no areas of discoloration; Genitalia and Groin:  No rashes present, no lesions present, no areas of discoloration, no masses present.  Neurologic/Psychiatric:  Mental Status:  Oriented X3   Pelvic Exam:  External Genitalia:     Normal appearance for age, no discharge present, no tenderness present, no inflammatory lesions present, color normal  Vagina:     Normal vaginal vault without central or paravaginal defects, scant discharge present, no inflammatory lesions present, no masses present  Bladder:     Nontender to palpation  Urethra:   Urethral Body:  Urethra palpation normal, urethra structural support normal   Urethral Meatus:  No erythema or lesions present  Cervix:     Appearance healthy, no lesions present, nontender to palpation, no bleeding present, IUD strings visualized at external os  Uterus:     Uterus: firm, normal sized and nontender, midplane in position.   Adnexa:     No adnexal tenderness present, no adnexal masses present  Perineum:     Perineum within normal limits, no evidence of trauma, no rashes or skin lesions present  Anus:     Anus within normal limits, no hemorrhoids present  Inguinal Lymph Nodes:     No lymphadenopathy present  Pubic Hair:     Normal pubic hair distribution for age  Genitalia and Groin:     No rashes present, no lesions present, no areas of discoloration, no masses present       In-Clinic Test Results:  Results for orders placed or performed in visit on 03/13/19 (from the past 24 hour(s))   HCL HCG, URINE, NURSE BACKOFFICE   Result Value Ref Range    hCG, Qual Urine negative        ASSESSMENT/PLAN:                                                        ICD-10-CM    1. Encounter for insertion of intrauterine contraceptive device Z30.430 HCL HCG, URINE, NURSE BACKOFFICE   2. Vaginal  discharge N89.8 NEISSERIA GONORRHOEA PCR     CHLAMYDIA TRACHOMATIS PCR     Wet prep   3. Breast tenderness N64.4 US Breast Bilateral Complete 4 Quadrants   4. Screening for cervical cancer Z12.4 PAP imaged thin layer screen   5. Special screening examination for human papillomavirus (HPV) Z11.51 HPV High Risk Types DNA Cervical       PLAN:    Wet prep, pap, and STI cultures collected. Bilateral breast ultrasound ordered. Will notify patient of results and form plan of care. Patient verbalizes understanding and agrees with plan.     Sharla Honeycutt CNM

## 2019-03-13 NOTE — LETTER
March 21, 2019    Gerri Rosado  [address] [address]    Dear ,  This letter is regarding your recent Pap smear (cervical cancer screening) and Human Papillomavirus (HPV) test.  We are happy to inform you that your Pap smear result is normal. Cervical cancer is closely linked with certain types of HPV. Your results showed no evidence of high-risk HPV.  Therefore we recommend you return in 5 years for your next pap smear and HPV test.  You will still need to return to the clinic every year for an annual exam and other preventive tests.  If you have additional questions regarding this result, please call our registered nurse, Nya at [phone].  Sincerely,    QUAN Yang CNM/edna

## 2019-03-13 NOTE — NURSING NOTE
"Chief Complaint   Patient presents with     IUD     Breast swollen, vaginal odor, itching, dryness        Initial BP 98/64 (BP Location: Right arm, Patient Position: Sitting, Cuff Size: Adult Regular)   Ht 1.676 m (5' 6\")   Wt 67.1 kg (148 lb)   LMP  (LMP Unknown)   BMI 23.89 kg/m   Estimated body mass index is 23.89 kg/m  as calculated from the following:    Height as of this encounter: 1.676 m (5' 6\").    Weight as of this encounter: 67.1 kg (148 lb).  BP completed using cuff size: regular    Questioned patient about current smoking habits.  Pt. has never smoked.          The following HM Due: NONE    Seth Merchant CMA                 "

## 2019-03-14 LAB
C TRACH DNA SPEC QL NAA+PROBE: NEGATIVE
N GONORRHOEA DNA SPEC QL NAA+PROBE: NEGATIVE
SPECIMEN SOURCE: NORMAL
SPECIMEN SOURCE: NORMAL

## 2019-03-14 ASSESSMENT — ANXIETY QUESTIONNAIRES: GAD7 TOTAL SCORE: 6

## 2019-03-15 LAB
COPATH REPORT: NORMAL
PAP: NORMAL

## 2019-03-18 ENCOUNTER — OFFICE VISIT (OUTPATIENT)
Dept: INTERNAL MEDICINE | Facility: CLINIC | Age: 29
End: 2019-03-18
Payer: COMMERCIAL

## 2019-03-18 VITALS
HEART RATE: 72 BPM | RESPIRATION RATE: 12 BRPM | HEIGHT: 66 IN | OXYGEN SATURATION: 97 % | WEIGHT: 146 LBS | BODY MASS INDEX: 23.46 KG/M2 | TEMPERATURE: 97.9 F | DIASTOLIC BLOOD PRESSURE: 70 MMHG | SYSTOLIC BLOOD PRESSURE: 120 MMHG

## 2019-03-18 DIAGNOSIS — F90.0 ATTENTION DEFICIT HYPERACTIVITY DISORDER (ADHD), PREDOMINANTLY INATTENTIVE TYPE: Chronic | ICD-10-CM

## 2019-03-18 PROCEDURE — 99213 OFFICE O/P EST LOW 20 MIN: CPT | Performed by: INTERNAL MEDICINE

## 2019-03-18 RX ORDER — DEXTROAMPHETAMINE SACCHARATE, AMPHETAMINE ASPARTATE, DEXTROAMPHETAMINE SULFATE AND AMPHETAMINE SULFATE 5; 5; 5; 5 MG/1; MG/1; MG/1; MG/1
20 TABLET ORAL 2 TIMES DAILY
Qty: 60 TABLET | Refills: 0 | Status: SHIPPED | OUTPATIENT
Start: 2019-04-18 | End: 2019-08-15

## 2019-03-18 RX ORDER — DEXTROAMPHETAMINE SACCHARATE, AMPHETAMINE ASPARTATE, DEXTROAMPHETAMINE SULFATE AND AMPHETAMINE SULFATE 5; 5; 5; 5 MG/1; MG/1; MG/1; MG/1
20 TABLET ORAL 2 TIMES DAILY
Qty: 60 TABLET | Refills: 0 | Status: SHIPPED | OUTPATIENT
Start: 2019-03-18 | End: 2019-06-13

## 2019-03-18 ASSESSMENT — MIFFLIN-ST. JEOR: SCORE: 1399

## 2019-03-18 NOTE — PATIENT INSTRUCTIONS
Plan:  1. Continue same dose Adderall   2. Prescription for March and April  3. In May I will consider giving you 3 months Rx

## 2019-03-18 NOTE — PROGRESS NOTES
"Patient's instructions / PLAN:                                                        Plan:  1. Continue same dose Adderall   2. Prescription for march and April  3. In May I will consider giving you 3 months Rx        ASSESSMENT & PLAN:                                                      (F90.0) ADHD, inattentive type  Comment:   Plan: amphetamine-dextroamphetamine (ADDERALL) 20 MG         tablet, amphetamine-dextroamphetamine         (ADDERALL) 20 MG tablet               Chief Complaint:                                                      F/u ADHD      SUBJECTIVE:                                                    History of present illness     ADHD - doing well, no co/o Adderall works well     Her dog  unexpectlylast night and she is tears today     ROS:                                                      ROS: negative for fever, chills, cough, wheezes, chest pain, shortness of breath, vomiting, abdominal pain, leg swelling       OBJECTIVE:                                                    Physical Exam :    Blood pressure 120/70, pulse 72, temperature 97.9  F (36.6  C), temperature source Oral, resp. rate 12, height 1.676 m (5' 6\"), weight 66.2 kg (146 lb), SpO2 97 %, not currently breastfeeding.   NAD, appears comfortable    PMHx: reviewed  Past Medical History:   Diagnosis Date     Anxiety      Pap smear for cervical cancer screening     biopsy taken and ok      PSHx: reviewed  Past Surgical History:   Procedure Laterality Date     NO HISTORY OF SURGERY          Meds: reviewed  Current Outpatient Medications   Medication Sig Dispense Refill     amphetamine-dextroamphetamine (ADDERALL) 20 MG tablet Take 1 tablet (20 mg) by mouth 2 times daily 60 tablet 0     levonorgestrel (MIRENA, 52 MG,) 20 MCG/24HR IUD 1 each (20 mcg) by Intrauterine route once for 1 dose 1 each 0       Soc Hx: reviewed  Fam Hx: reviewed          Mary Ann Maravilla MD  Internal Medicine     "

## 2019-03-19 LAB
FINAL DIAGNOSIS: NORMAL
HPV HR 12 DNA CVX QL NAA+PROBE: NEGATIVE
HPV16 DNA SPEC QL NAA+PROBE: NEGATIVE
HPV18 DNA SPEC QL NAA+PROBE: NEGATIVE
SPECIMEN DESCRIPTION: NORMAL
SPECIMEN SOURCE CVX/VAG CYTO: NORMAL

## 2019-06-13 DIAGNOSIS — F90.0 ATTENTION DEFICIT HYPERACTIVITY DISORDER (ADHD), PREDOMINANTLY INATTENTIVE TYPE: Chronic | ICD-10-CM

## 2019-06-13 RX ORDER — DEXTROAMPHETAMINE SACCHARATE, AMPHETAMINE ASPARTATE, DEXTROAMPHETAMINE SULFATE AND AMPHETAMINE SULFATE 5; 5; 5; 5 MG/1; MG/1; MG/1; MG/1
20 TABLET ORAL 2 TIMES DAILY
Qty: 60 TABLET | Refills: 0 | Status: SHIPPED | OUTPATIENT
Start: 2019-06-13 | End: 2019-08-28

## 2019-06-13 RX ORDER — DEXTROAMPHETAMINE SACCHARATE, AMPHETAMINE ASPARTATE, DEXTROAMPHETAMINE SULFATE AND AMPHETAMINE SULFATE 5; 5; 5; 5 MG/1; MG/1; MG/1; MG/1
20 TABLET ORAL 2 TIMES DAILY
Qty: 60 TABLET | Refills: 0 | Status: SHIPPED | OUTPATIENT
Start: 2019-07-12 | End: 2019-08-28

## 2019-06-13 NOTE — TELEPHONE ENCOUNTER
Requested Prescriptions   Pending Prescriptions Disp Refills     amphetamine-dextroamphetamine (ADDERALL) 20 MG tablet  Last Written Prescription Date:  4/18/19  Last Fill Quantity: 60,  # refills: 0   Last office visit: 3/18/2019 with prescribing provider:  Renita   Future Office Visit:   60 tablet 0     Sig: Take 1 tablet (20 mg) by mouth 2 times daily       There is no refill protocol information for this order

## 2019-08-14 DIAGNOSIS — F90.0 ATTENTION DEFICIT HYPERACTIVITY DISORDER (ADHD), PREDOMINANTLY INATTENTIVE TYPE: Chronic | ICD-10-CM

## 2019-08-14 RX ORDER — DEXTROAMPHETAMINE SACCHARATE, AMPHETAMINE ASPARTATE, DEXTROAMPHETAMINE SULFATE AND AMPHETAMINE SULFATE 5; 5; 5; 5 MG/1; MG/1; MG/1; MG/1
20 TABLET ORAL 2 TIMES DAILY
Qty: 60 TABLET | Refills: 0 | Status: CANCELLED | OUTPATIENT
Start: 2019-08-14

## 2019-08-14 NOTE — TELEPHONE ENCOUNTER
Requested Prescriptions   Pending Prescriptions Disp Refills     amphetamine-dextroamphetamine (ADDERALL) 20 MG tablet  Last Written Prescription Date:  7/12/19  Last Fill Quantity: 60,  # refills: 0   Last office visit: 3/18/2019 with prescribing provider:  Renita   Future Office Visit:   60 tablet 0     Sig: Take 1 tablet (20 mg) by mouth 2 times daily       There is no refill protocol information for this order

## 2019-08-15 RX ORDER — DEXTROAMPHETAMINE SACCHARATE, AMPHETAMINE ASPARTATE, DEXTROAMPHETAMINE SULFATE AND AMPHETAMINE SULFATE 5; 5; 5; 5 MG/1; MG/1; MG/1; MG/1
20 TABLET ORAL 2 TIMES DAILY
Qty: 60 TABLET | Refills: 0 | Status: SHIPPED | OUTPATIENT
Start: 2019-08-15 | End: 2019-08-26

## 2019-08-15 NOTE — TELEPHONE ENCOUNTER
Patient is calling to see if this can be done so that she can pick it up by Saturday because she is going out of town early Sunday.

## 2019-08-15 NOTE — TELEPHONE ENCOUNTER
Encounter-Level CSA - 04/15/2016:    Controlled Substance Agreement - Scan on 4/21/2016 10:03 AM: [place] Controlled Substance Agreement, 4/15/16 (below)       Patient-Level CSA:    There are no patient-level csa.        [place] reviewed:  reviewed- no concerns    [place]:  [email or web address]    Adderall last filled 7/15/19    Beena Askew RN

## 2019-08-26 ENCOUNTER — TELEPHONE (OUTPATIENT)
Dept: INTERNAL MEDICINE | Facility: CLINIC | Age: 29
End: 2019-08-26

## 2019-08-26 DIAGNOSIS — F90.0 ATTENTION DEFICIT HYPERACTIVITY DISORDER (ADHD), PREDOMINANTLY INATTENTIVE TYPE: Chronic | ICD-10-CM

## 2019-08-26 RX ORDER — DEXTROAMPHETAMINE SACCHARATE, AMPHETAMINE ASPARTATE, DEXTROAMPHETAMINE SULFATE AND AMPHETAMINE SULFATE 5; 5; 5; 5 MG/1; MG/1; MG/1; MG/1
20 TABLET ORAL 2 TIMES DAILY
Qty: 60 TABLET | Refills: 0 | Status: SHIPPED | OUTPATIENT
Start: 2019-08-26 | End: 2019-10-26 | Stop reason: DRUGHIGH
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
## 2019-08-28 ENCOUNTER — OFFICE VISIT (OUTPATIENT)
Dept: INTERNAL MEDICINE | Facility: CLINIC | Age: 29
End: 2019-08-28
Payer: COMMERCIAL

## 2019-08-28 VITALS
RESPIRATION RATE: 12 BRPM | BODY MASS INDEX: 22.76 KG/M2 | WEIGHT: 145 LBS | SYSTOLIC BLOOD PRESSURE: 120 MMHG | HEART RATE: 72 BPM | DIASTOLIC BLOOD PRESSURE: 68 MMHG | HEIGHT: 67 IN | OXYGEN SATURATION: 99 % | TEMPERATURE: 98 F

## 2019-08-28 DIAGNOSIS — F90.0 ATTENTION DEFICIT HYPERACTIVITY DISORDER (ADHD), PREDOMINANTLY INATTENTIVE TYPE: Primary | Chronic | ICD-10-CM

## 2019-08-28 PROCEDURE — 99213 OFFICE O/P EST LOW 20 MIN: CPT | Performed by: INTERNAL MEDICINE

## 2019-08-28 RX ORDER — DEXTROAMPHETAMINE SACCHARATE, AMPHETAMINE ASPARTATE, DEXTROAMPHETAMINE SULFATE AND AMPHETAMINE SULFATE 7.5; 7.5; 7.5; 7.5 MG/1; MG/1; MG/1; MG/1
30 TABLET ORAL DAILY
Qty: 30 TABLET | Refills: 0 | Status: SHIPPED | OUTPATIENT
Start: 2019-08-28 | End: 2019-09-25

## 2019-08-28 RX ORDER — DEXTROAMPHETAMINE SACCHARATE, AMPHETAMINE ASPARTATE, DEXTROAMPHETAMINE SULFATE AND AMPHETAMINE SULFATE 2.5; 2.5; 2.5; 2.5 MG/1; MG/1; MG/1; MG/1
10 TABLET ORAL DAILY
Qty: 30 TABLET | Refills: 0 | Status: SHIPPED | OUTPATIENT
Start: 2019-08-28 | End: 2019-09-25

## 2019-08-28 ASSESSMENT — MIFFLIN-ST. JEOR: SCORE: 1401.38
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
## 2019-08-28 NOTE — PATIENT INSTRUCTIONS
Plan:  1. Change Adderall to 10 mg in the morning and 30 mg in the afternoon.   2. Call before the next prescription and tell us which schedule worked better: 20+ 20 or 10+ 30  3. Follow up in 6 months      For info about how to use My Chart: call   [employer] Help Line [phone]   [employer] Log-on Page: [email or web address][email or web address]  MyChart Deon Page: [email or web address]

## 2019-08-28 NOTE — NURSING NOTE
"/68   Pulse 72   Temp 98  F (36.7  C)   Resp 12   Ht 1.695 m (5' 6.75\")   Wt 65.8 kg (145 lb)   SpO2 99%   BMI 22.88 kg/m      "

## 2019-09-25 ENCOUNTER — MYC REFILL (OUTPATIENT)
Dept: INTERNAL MEDICINE | Facility: CLINIC | Age: 29
End: 2019-09-25

## 2019-09-25 DIAGNOSIS — Z79.899 CONTROLLED SUBSTANCE AGREEMENT SIGNED: Primary | ICD-10-CM

## 2019-09-25 DIAGNOSIS — F90.0 ATTENTION DEFICIT HYPERACTIVITY DISORDER (ADHD), PREDOMINANTLY INATTENTIVE TYPE: Chronic | ICD-10-CM

## 2019-09-26 RX ORDER — DEXTROAMPHETAMINE SACCHARATE, AMPHETAMINE ASPARTATE, DEXTROAMPHETAMINE SULFATE AND AMPHETAMINE SULFATE 7.5; 7.5; 7.5; 7.5 MG/1; MG/1; MG/1; MG/1
30 TABLET ORAL DAILY
Qty: 30 TABLET | Refills: 0 | Status: SHIPPED | OUTPATIENT
Start: 2019-09-26 | End: 2019-10-24

## 2019-09-26 RX ORDER — DEXTROAMPHETAMINE SACCHARATE, AMPHETAMINE ASPARTATE, DEXTROAMPHETAMINE SULFATE AND AMPHETAMINE SULFATE 2.5; 2.5; 2.5; 2.5 MG/1; MG/1; MG/1; MG/1
10 TABLET ORAL DAILY
Qty: 30 TABLET | Refills: 0 | Status: SHIPPED | OUTPATIENT
Start: 2019-09-26 | End: 2019-10-24

## 2019-09-26 NOTE — TELEPHONE ENCOUNTER
Controlled Substance Refill Request for   Adderall 30 mg Last rx 8/28/19 for #30  Adderall 10 mg Last rx 8/28/19 for #30  Problem List Complete:  No     PROVIDER TO CONSIDER COMPLETION OF PROBLEM LIST AND OVERVIEW/CONTROLLED SUBSTANCE AGREEMENT    THE MOST RECENT OFFICE VISIT MUST BE WITHIN THE PAST 3 MONTHS. AT LEAST ONE FACE TO FACE VISIT MUST OCCUR EVERY 6 MONTHS. ADDITIONAL VISITS CAN BE VIRTUAL.  (THIS STATEMENT SHOULD BE DELETED.)    Last Office Visit with [facility] [place] primary care provider: 8/28/19    Future Office visit:     Controlled substance agreement:   Encounter-Level CSA - 04/15/2016:    Controlled Substance Agreement - Scan on 4/21/2016 10:03 AM: [place] Controlled Substance Agreement, 4/15/16     Patient-Level CSA:    There are no patient-level csa.         Last Urine Drug Screen: No results found for: CDAUT, No results found for: COMDAT, No results found for: THC13, PCP13, COC13, MAMP13, OPI13, AMP13, BZO13, TCA13, MTD13, BAR13, OXY13, PPX13, BUP13     Processing:  Staff will hand deliver Rx to on-site pharmacy     [email or web address]       checked in past 3 months?  Yes 8/15/19 no concerns

## 2019-10-24 ENCOUNTER — MYC REFILL (OUTPATIENT)
Dept: INTERNAL MEDICINE | Facility: CLINIC | Age: 29
End: 2019-10-24

## 2019-10-24 DIAGNOSIS — F90.0 ATTENTION DEFICIT HYPERACTIVITY DISORDER (ADHD), PREDOMINANTLY INATTENTIVE TYPE: Chronic | ICD-10-CM

## 2019-10-24 DIAGNOSIS — Z79.899 CONTROLLED SUBSTANCE AGREEMENT SIGNED: ICD-10-CM

## 2019-10-25 NOTE — TELEPHONE ENCOUNTER
Controlled Substance Refill Request for Adderall 10 mg and 30 mg  Problem List Complete:  No     PROVIDER TO CONSIDER COMPLETION OF PROBLEM LIST AND OVERVIEW/CONTROLLED SUBSTANCE AGREEMENT    Last Written Prescription Date:  9/26/19  Last Fill Quantity: 30,   # refills: 0    THE MOST RECENT OFFICE VISIT MUST BE WITHIN THE PAST 3 MONTHS. AT LEAST ONE FACE TO FACE VISIT MUST OCCUR EVERY 6 MONTHS. ADDITIONAL VISITS CAN BE VIRTUAL.    Last Office Visit with [facility] primary care provider: 8/28/19    Future Office visit:     Controlled substance agreement:   Encounter-Level CSA - 04/15/2016:    Controlled Substance Agreement - Scan on 4/21/2016 10:03 AM: [place] Controlled Substance Agreement, 4/15/16     Patient-Level CSA:    There are no patient-level csa.         Last Urine Drug Screen: No results found for: CDAUT, No results found for: COMDAT, No results found for: THC13, PCP13, COC13, MAMP13, OPI13, AMP13, BZO13, TCA13, MTD13, BAR13, OXY13, PPX13, BUP13     Processing:  Rx to be electronically transmitted to pharmacy by provider      [email or web address]       checked in past 3 months?  Yes no concerns

## 2019-10-26 RX ORDER — DEXTROAMPHETAMINE SACCHARATE, AMPHETAMINE ASPARTATE, DEXTROAMPHETAMINE SULFATE AND AMPHETAMINE SULFATE 7.5; 7.5; 7.5; 7.5 MG/1; MG/1; MG/1; MG/1
30 TABLET ORAL DAILY
Qty: 30 TABLET | Refills: 0 | Status: SHIPPED | OUTPATIENT
Start: 2019-12-23 | End: 2020-03-10

## 2019-10-26 RX ORDER — DEXTROAMPHETAMINE SACCHARATE, AMPHETAMINE ASPARTATE, DEXTROAMPHETAMINE SULFATE AND AMPHETAMINE SULFATE 7.5; 7.5; 7.5; 7.5 MG/1; MG/1; MG/1; MG/1
30 TABLET ORAL DAILY
Qty: 30 TABLET | Refills: 0 | Status: SHIPPED | OUTPATIENT
Start: 2019-10-26 | End: 2020-02-08

## 2019-10-26 RX ORDER — DEXTROAMPHETAMINE SACCHARATE, AMPHETAMINE ASPARTATE, DEXTROAMPHETAMINE SULFATE AND AMPHETAMINE SULFATE 2.5; 2.5; 2.5; 2.5 MG/1; MG/1; MG/1; MG/1
10 TABLET ORAL DAILY
Qty: 30 TABLET | Refills: 0 | Status: SHIPPED | OUTPATIENT
Start: 2019-11-25 | End: 2020-03-10

## 2019-10-26 RX ORDER — DEXTROAMPHETAMINE SACCHARATE, AMPHETAMINE ASPARTATE, DEXTROAMPHETAMINE SULFATE AND AMPHETAMINE SULFATE 7.5; 7.5; 7.5; 7.5 MG/1; MG/1; MG/1; MG/1
30 TABLET ORAL DAILY
Qty: 30 TABLET | Refills: 0 | Status: SHIPPED | OUTPATIENT
Start: 2019-11-25 | End: 2020-03-10

## 2019-10-26 RX ORDER — DEXTROAMPHETAMINE SACCHARATE, AMPHETAMINE ASPARTATE, DEXTROAMPHETAMINE SULFATE AND AMPHETAMINE SULFATE 2.5; 2.5; 2.5; 2.5 MG/1; MG/1; MG/1; MG/1
10 TABLET ORAL DAILY
Qty: 30 TABLET | Refills: 0 | Status: SHIPPED | OUTPATIENT
Start: 2019-12-23 | End: 2020-03-10

## 2019-10-26 RX ORDER — DEXTROAMPHETAMINE SACCHARATE, AMPHETAMINE ASPARTATE, DEXTROAMPHETAMINE SULFATE AND AMPHETAMINE SULFATE 2.5; 2.5; 2.5; 2.5 MG/1; MG/1; MG/1; MG/1
10 TABLET ORAL DAILY
Qty: 30 TABLET | Refills: 0 | Status: SHIPPED | OUTPATIENT
Start: 2019-10-26 | End: 2020-02-08

## 2019-10-28 ENCOUNTER — HEALTH MAINTENANCE LETTER (OUTPATIENT)
Age: 29
End: 2019-10-28

## 2019-11-26 ENCOUNTER — MYC REFILL (OUTPATIENT)
Dept: INTERNAL MEDICINE | Facility: CLINIC | Age: 29
End: 2019-11-26

## 2019-11-26 DIAGNOSIS — F90.0 ATTENTION DEFICIT HYPERACTIVITY DISORDER (ADHD), PREDOMINANTLY INATTENTIVE TYPE: Chronic | ICD-10-CM

## 2019-11-26 DIAGNOSIS — Z79.899 CONTROLLED SUBSTANCE AGREEMENT SIGNED: ICD-10-CM

## 2019-11-26 RX ORDER — DEXTROAMPHETAMINE SACCHARATE, AMPHETAMINE ASPARTATE, DEXTROAMPHETAMINE SULFATE AND AMPHETAMINE SULFATE 2.5; 2.5; 2.5; 2.5 MG/1; MG/1; MG/1; MG/1
10 TABLET ORAL DAILY
Qty: 30 TABLET | Refills: 0 | Status: CANCELLED | OUTPATIENT
Start: 2019-11-26

## 2019-11-26 RX ORDER — DEXTROAMPHETAMINE SACCHARATE, AMPHETAMINE ASPARTATE, DEXTROAMPHETAMINE SULFATE AND AMPHETAMINE SULFATE 7.5; 7.5; 7.5; 7.5 MG/1; MG/1; MG/1; MG/1
30 TABLET ORAL DAILY
Qty: 30 TABLET | Refills: 0 | Status: CANCELLED | OUTPATIENT
Start: 2019-11-26

## 2020-02-08 ENCOUNTER — MYC REFILL (OUTPATIENT)
Dept: INTERNAL MEDICINE | Facility: CLINIC | Age: 30
End: 2020-02-08

## 2020-02-08 DIAGNOSIS — Z79.899 CONTROLLED SUBSTANCE AGREEMENT SIGNED: ICD-10-CM

## 2020-02-08 DIAGNOSIS — F90.0 ATTENTION DEFICIT HYPERACTIVITY DISORDER (ADHD), PREDOMINANTLY INATTENTIVE TYPE: Chronic | ICD-10-CM

## 2020-02-11 RX ORDER — DEXTROAMPHETAMINE SACCHARATE, AMPHETAMINE ASPARTATE, DEXTROAMPHETAMINE SULFATE AND AMPHETAMINE SULFATE 7.5; 7.5; 7.5; 7.5 MG/1; MG/1; MG/1; MG/1
30 TABLET ORAL DAILY
Qty: 30 TABLET | Refills: 0 | Status: SHIPPED | OUTPATIENT
Start: 2020-02-11 | End: 2020-03-10

## 2020-02-11 RX ORDER — DEXTROAMPHETAMINE SACCHARATE, AMPHETAMINE ASPARTATE, DEXTROAMPHETAMINE SULFATE AND AMPHETAMINE SULFATE 2.5; 2.5; 2.5; 2.5 MG/1; MG/1; MG/1; MG/1
10 TABLET ORAL DAILY
Qty: 30 TABLET | Refills: 0 | Status: SHIPPED | OUTPATIENT
Start: 2020-02-11 | End: 2020-03-10

## 2020-02-11 NOTE — TELEPHONE ENCOUNTER
Pt calling to check on status of refill request below. Pharmacy switched to [facility] in [place] per pt.

## 2020-02-11 NOTE — TELEPHONE ENCOUNTER
Controlled Substance Refill Request for Adderall  Problem List Complete:  No     PROVIDER TO CONSIDER COMPLETION OF PROBLEM LIST AND OVERVIEW/CONTROLLED SUBSTANCE AGREEMENT    Last Written Prescription Date:  12/23/19  Last Fill Quantity: 30,   # refills: 0    Last Office Visit with [facility] primary care provider: 8/28/19    Future Office visit:     Controlled substance agreement:   Encounter-Level CSA - 04/15/2016:    Controlled Substance Agreement - Scan on 4/21/2016 10:03 AM: [place] Controlled Substance Agreement, 4/15/16     Patient-Level CSA:    There are no patient-level csa.         Last Urine Drug Screen: No results found for: CDAUT, No results found for: COMDAT, No results found for: THC13, PCP13, COC13, MAMP13, OPI13, AMP13, BZO13, TCA13, MTD13, BAR13, OXY13, PPX13, BUP13     Processing:  Rx to be electronically transmitted to pharmacy by provider      [email or web address]       checked in past 3 months?  Yes 10/25/19

## 2020-03-10 ENCOUNTER — OFFICE VISIT (OUTPATIENT)
Dept: INTERNAL MEDICINE | Facility: CLINIC | Age: 30
End: 2020-03-10
Payer: COMMERCIAL

## 2020-03-10 VITALS
RESPIRATION RATE: 16 BRPM | HEIGHT: 67 IN | OXYGEN SATURATION: 98 % | WEIGHT: 146 LBS | SYSTOLIC BLOOD PRESSURE: 98 MMHG | TEMPERATURE: 97.5 F | HEART RATE: 84 BPM | DIASTOLIC BLOOD PRESSURE: 64 MMHG | BODY MASS INDEX: 22.91 KG/M2

## 2020-03-10 DIAGNOSIS — F90.0 ATTENTION DEFICIT HYPERACTIVITY DISORDER (ADHD), PREDOMINANTLY INATTENTIVE TYPE: Chronic | ICD-10-CM

## 2020-03-10 DIAGNOSIS — R41.840 CONCENTRATION DEFICIT: ICD-10-CM

## 2020-03-10 DIAGNOSIS — Z00.01 ENCOUNTER FOR GENERAL ADULT MEDICAL EXAMINATION WITH ABNORMAL FINDINGS: Primary | ICD-10-CM

## 2020-03-10 DIAGNOSIS — Z79.899 CONTROLLED SUBSTANCE AGREEMENT SIGNED: ICD-10-CM

## 2020-03-10 DIAGNOSIS — Z11.4 ENCOUNTER FOR SCREENING FOR HIV: ICD-10-CM

## 2020-03-10 PROBLEM — Z91.148 CONTROLLED SUBSTANCE AGREEMENT BROKEN: Status: ACTIVE | Noted: 2020-03-10

## 2020-03-10 LAB
AMPHETAMINES UR QL: ABNORMAL NG/ML
BARBITURATES UR QL SCN: NOT DETECTED NG/ML
BENZODIAZ UR QL SCN: NOT DETECTED NG/ML
BUPRENORPHINE UR QL: NOT DETECTED NG/ML
CANNABINOIDS UR QL: ABNORMAL NG/ML
COCAINE UR QL SCN: NOT DETECTED NG/ML
D-METHAMPHET UR QL: NOT DETECTED NG/ML
METHADONE UR QL SCN: NOT DETECTED NG/ML
OPIATES UR QL SCN: NOT DETECTED NG/ML
OXYCODONE UR QL SCN: NOT DETECTED NG/ML
PCP UR QL SCN: NOT DETECTED NG/ML
PROPOXYPH UR QL: NOT DETECTED NG/ML
TRICYCLICS UR QL SCN: NOT DETECTED NG/ML

## 2020-03-10 PROCEDURE — 99395 PREV VISIT EST AGE 18-39: CPT | Performed by: NURSE PRACTITIONER

## 2020-03-10 PROCEDURE — 80306 DRUG TEST PRSMV INSTRMNT: CPT | Performed by: NURSE PRACTITIONER

## 2020-03-10 RX ORDER — DEXTROAMPHETAMINE SACCHARATE, AMPHETAMINE ASPARTATE, DEXTROAMPHETAMINE SULFATE AND AMPHETAMINE SULFATE 7.5; 7.5; 7.5; 7.5 MG/1; MG/1; MG/1; MG/1
30 TABLET ORAL DAILY
Qty: 30 TABLET | Refills: 0 | Status: SHIPPED | OUTPATIENT
Start: 2020-03-10 | End: 2020-03-18 | Stop reason: DRUGHIGH

## 2020-03-10 RX ORDER — DEXTROAMPHETAMINE SACCHARATE, AMPHETAMINE ASPARTATE, DEXTROAMPHETAMINE SULFATE AND AMPHETAMINE SULFATE 2.5; 2.5; 2.5; 2.5 MG/1; MG/1; MG/1; MG/1
10 TABLET ORAL DAILY
Qty: 30 TABLET | Refills: 0 | Status: SHIPPED | OUTPATIENT
Start: 2020-03-10 | End: 2020-03-18 | Stop reason: DRUGHIGH

## 2020-03-10 ASSESSMENT — ENCOUNTER SYMPTOMS
SHORTNESS OF BREATH: 1
HEMATOCHEZIA: 0
EYE PAIN: 0
HEADACHES: 1
BREAST MASS: 0
SORE THROAT: 0
DIARRHEA: 1
DIZZINESS: 0
PALPITATIONS: 0
ABDOMINAL PAIN: 0
WEAKNESS: 0
JOINT SWELLING: 0
PARESTHESIAS: 0
FREQUENCY: 0
NERVOUS/ANXIOUS: 0
HEARTBURN: 1
MYALGIAS: 0
HEMATURIA: 0
CONSTIPATION: 0
NAUSEA: 0
FEVER: 0
CHILLS: 0
ARTHRALGIAS: 0
COUGH: 0
DYSURIA: 0

## 2020-03-10 ASSESSMENT — ANXIETY QUESTIONNAIRES
6. BECOMING EASILY ANNOYED OR IRRITABLE: SEVERAL DAYS
7. FEELING AFRAID AS IF SOMETHING AWFUL MIGHT HAPPEN: NOT AT ALL
3. WORRYING TOO MUCH ABOUT DIFFERENT THINGS: SEVERAL DAYS
7. FEELING AFRAID AS IF SOMETHING AWFUL MIGHT HAPPEN: NOT AT ALL
GAD7 TOTAL SCORE: 6
GAD7 TOTAL SCORE: 6
2. NOT BEING ABLE TO STOP OR CONTROL WORRYING: SEVERAL DAYS
1. FEELING NERVOUS, ANXIOUS, OR ON EDGE: SEVERAL DAYS
4. TROUBLE RELAXING: SEVERAL DAYS
5. BEING SO RESTLESS THAT IT IS HARD TO SIT STILL: SEVERAL DAYS

## 2020-03-10 ASSESSMENT — PATIENT HEALTH QUESTIONNAIRE - PHQ9
10. IF YOU CHECKED OFF ANY PROBLEMS, HOW DIFFICULT HAVE THESE PROBLEMS MADE IT FOR YOU TO DO YOUR WORK, TAKE CARE OF THINGS AT HOME, OR GET ALONG WITH OTHER PEOPLE: SOMEWHAT DIFFICULT
SUM OF ALL RESPONSES TO PHQ QUESTIONS 1-9: 13
SUM OF ALL RESPONSES TO PHQ QUESTIONS 1-9: 13

## 2020-03-10 ASSESSMENT — MIFFLIN-ST. JEOR: SCORE: 1405.91

## 2020-03-10 NOTE — NURSING NOTE
"Chief Complaint   Patient presents with     Physical     non fasting     initial BP 98/64   Pulse 84   Temp 97.5  F (36.4  C) (Oral)   Resp 16   Ht 1.695 m (5' 6.75\")   Wt 66.2 kg (146 lb)   SpO2 98%   BMI 23.04 kg/m   Estimated body mass index is 23.04 kg/m  as calculated from the following:    Height as of this encounter: 1.695 m (5' 6.75\").    Weight as of this encounter: 66.2 kg (146 lb)..  bp completed using cuff size regular  OMAIRA SCHWARZ LPN  "

## 2020-03-10 NOTE — PATIENT INSTRUCTIONS
Lab in [address]- urine test today     Make lab only appointment   You need to be fasting for 12hrs. You can have water and any meds you are on. But, no food, coffee, tea or diet pop.    Consider prasad or vyvanse

## 2020-03-10 NOTE — LETTER
[facility]  03/10/20    Patient: Gerri Rosado  YOB: 1990  Medical Record Number: [id number]  CSN: [id number]                                                                              Non-opioid Controlled Substance Agreement    I understand that my care provider has prescribed a controlled substance to help manage my condition(s). I am taking this medicine to help me function or work. I know this is strong medicine, and that it can cause serious side effects. Controlled substances can be sedating, addicting and may cause a dependency on the drug. They can affect my ability to drive or think, and cause depression. They need to be taken exactly as prescribed. Combining controlled substances with certain medicines or chemicals (such as cocaine, sedatives and tranquilizers, sleeping pills, meth) can be dangerous or even fatal. Also, if I stop controlled substances suddenly, I may have severe withdrawal symptoms.  If not helpful, I may be asked to stop them.    The risks, benefits, and side effects of these medicine(s) were explained to me. I agree that:    1. I will take part in other treatments as advised by my care team. This may be psychiatry or counseling, physical therapy, behavioral therapy, group treatment or a referral to a pain clinic. I will reduce or stop my medicine when my care team tells me to do so.  2. I will take my medicines as prescribed. I will not change the dose or schedule unless my care team tells me to. There will be no refills if I  run out early.   I may be contactedwithout warning and asked to complete a urine drug test or pill count at any time.   3. I will keep all my appointments, and understand this is part of the monitoring of controlled substances. My care team may require an office visit for EVERY controlled substance refill. If I miss appointments or don t follow instructions, my care team may stop my medicine.  4. I will not ask other providers to  prescribe controlled substances, and I will not accept controlled substances from other people. If I need another prescribed controlled substance for a new reason, I will tell my care team within 1 business day.  5. I will use one pharmacy to fill all of my controlled substance prescriptions, and it is up to me to make sure that I do not run out of my medicines on weekends or holidays. If my care team is willing to refill my controlled substance prescription without a visit, I must request refills only during office hours, refills may take up to 3 days to process, and it may take up to 5 to 7 days for my medicine to be mailed and ready at my pharmacy. Prescriptions will not be mailed anywhere except my pharmacy.    6. I am responsible for my prescriptions. If the medicine/prescription is lost or stolen, it will not be replaced. I also agree not to share controlled substance medicines with anyone.              [facility]  03/10/20  Patient:  Gerri Rosado  YOB: 1990  Medical Record Number: [id number]  CSN: [id number]    7. I agree to not use ANY illegal or recreational drugs. This includes marijuana, cocaine, bath salts or other drugs. I agree not to use alcohol unless my care team says I may. I agree to give urine samples whenever asked. If I don t give a urine sample, the care team may stop my medicine.    8. If I enroll in the [place] Medical Marijuana program, I will tell my care team. I will also sign an agreement to share my medical records with my care team.    9. I will bring in my list of medicines (or my medicine bottles) each time I come to the clinic.   10. I will tell my care team right away if I become pregnant or have a new medical problem treated outside of my regular clinic.  11. I understand that this medicine can affect my thinking and judgment. It may be unsafe for me to drive, use machinery and do dangerous tasks. I will not do any of these things until I know how  the medicine affects me. If my dose changes, I will wait to see how it affects me. I will contact my care team if I have concerns about medicine side effects.    I understand that if I do not follow any of the conditions above, my prescriptions or treatment may be stopped.      I agree that my provider, clinic care team, and pharmacy may work with any city, state or federal law enforcement agency that investigates the misuse, sale, or other diversion of my controlled medicine. I will allow my provider to discuss my care with or share a copy of this agreement with any other treating provider, pharmacy or emergency room where I receive care. I agree to give up (waive) any right of privacy or confidentiality with respect to these consents.   I have read this agreement and have asked questions about anything I did not understand.    ____________________________________________________    ____________  ________  Patient signature                                                         Date      Time    ____________________________________________________     ____________  ________  Witness                                                          Date      Time    ____________________________________________________  Provider signature

## 2020-03-10 NOTE — PROGRESS NOTES
SUBJECTIVE:   CC: Gerri Rosado is an 29 year old woman who presents for preventive health visit.     Healthy Habits:     Getting at least 3 servings of Calcium per day:  NO    Bi-annual eye exam:  NO    Dental care twice a year:  NO    Sleep apnea or symptoms of sleep apnea:  Daytime drowsiness    Diet:  Regular (no restrictions)    Frequency of exercise:  None    Taking medications regularly:  Yes    Medication side effects:  Other    PHQ-2 Total Score: 4    Additional concerns today:  No        Today's PHQ-2 Score:   PHQ-2 (  [employer]) 3/10/2020   Q1: Little interest or pleasure in doing things 3   Q2: Feeling down, depressed or hopeless 1   PHQ-2 Score 4   Q1: Little interest or pleasure in doing things Nearly every day   Q2: Feeling down, depressed or hopeless Several days   PHQ-2 Score 4       Abuse: Current or Past(Physical, Sexual or Emotional)- No  Do you feel safe in your environment? Yes        Social History     Tobacco Use     Smoking status: Current Some Day Smoker     Packs/day: 0.10     Years: 5.00     Pack years: 0.50     Types: Cigarettes     Last attempt to quit: 2012     Years since quittin.2     Smokeless tobacco: Never Used   Substance Use Topics     Alcohol use: Yes     Comment: 2 drinks weekly         Alcohol Use 3/10/2020   Prescreen: >3 drinks/day or >7 drinks/week? No   Prescreen: >3 drinks/day or >7 drinks/week? -       Reviewed orders with patient.  Reviewed health maintenance and updated orders accordingly - Yes          Pertinent mammograms are reviewed under the imaging tab.  History of abnormal Pap smear: NO - age 30-65 PAP every 5 years with negative HPV co-testing recommended  PAP / HPV Latest Ref Rng & Units 3/13/2019 10/20/2016 2013   PAP - NIL NIL NIL   HPV 16 DNA NEG:Negative Negative - -   HPV 18 DNA NEG:Negative Negative - -   OTHER HR HPV NEG:Negative Negative - -     Reviewed and updated as needed this visit by clinical staff  Tobacco  Allergies  Meds  " Problems  Med Hx  Surg Hx  Fam Hx         Reviewed and updated as needed this visit by Provider  Tobacco  Allergies  Meds  Problems  Med Hx  Surg Hx  Fam Hx            Review of Systems   Constitutional: Negative for chills and fever.   HENT: Positive for hearing loss. Negative for congestion, ear pain and sore throat.    Eyes: Negative for pain and visual disturbance.   Respiratory: Positive for shortness of breath. Negative for cough.    Cardiovascular: Negative for chest pain, palpitations and peripheral edema.   Gastrointestinal: Positive for diarrhea and heartburn. Negative for abdominal pain, constipation, hematochezia and nausea.   Breasts:  Negative for tenderness, breast mass and discharge.   Genitourinary: Negative for dysuria, frequency, genital sores, hematuria, pelvic pain, urgency, vaginal bleeding and vaginal discharge.   Musculoskeletal: Negative for arthralgias, joint swelling and myalgias.   Skin: Negative for rash.   Neurological: Positive for headaches. Negative for dizziness, weakness and paresthesias.   Psychiatric/Behavioral: Positive for mood changes. The patient is not nervous/anxious.      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     Starting a new job    IUD     Hearing decreased lately - left more than right   Does not have concern about it at this time     Short of breath- adderall makes her feel that way at times     Diarrhea and heartburn - related to adderal     Headaches end of day - related to adderall     Mood changes related to adderall     Discussed possibly trying prasad or vyvanse uimana future        OBJECTIVE:   BP 98/64   Pulse 84   Temp 97.5  F (36.4  C) (Oral)   Resp 16   Ht 1.695 m (5' 6.75\")   Wt " 66.2 kg (146 lb)   SpO2 98%   BMI 23.04 kg/m    Physical Exam  GENERAL:  alert and no distress  EYES: Eyes grossly normal to inspection, and conjunctivae and sclerae normal  HENT: ear canals and TM's normal, nose and mouth without ulcers or lesions  NECK: no adenopathy, no asymmetry, masses, or scars and thyroid normal to palpation  RESP: lungs clear to auscultation - no rales, rhonchi or wheezes  BREAST: normal without masses, tenderness or nipple discharge and no palpable axillary masses or adenopathy  CV: regular rate and rhythm, normal S1 S2, no S3 or S4, no murmur, click or rub, no peripheral edema and peripheral pulses strong  ABDOMEN: soft, nontender, no hepatosplenomegaly, no masses and bowel sounds normal  MS: no gross musculoskeletal defects noted, no edema  SKIN: no suspicious lesions or rashes  NEURO: Normal strength and tone, mentation intact and speech normal  PSYCH: mentation appears normal, affect normal/bright    Diagnostic Test Results:  Lab   Urine     ASSESSMENT/PLAN:   1. Encounter for general adult medical examination with abnormal findings    - Lipid panel reflex to direct LDL Fasting; Future  - Comprehensive metabolic panel; Future  - TSH with free T4 reflex; Future  - CBC with platelets and differential; Future  - Vitamin D Deficiency; Future    2. Concentration deficit  On adderall  - TSH with free T4 reflex; Future  - CBC with platelets and differential; Future  - Vitamin D Deficiency; Future    3. ADHD, inattentive type  Discussed alternative   - TSH with free T4 reflex; Future  - CBC with platelets and differential; Future  - Vitamin D Deficiency; Future  - Drug Abuse Screen Panel 13, Urine (Pain Care Package)    4. Encounter for screening for HIV    - HIV Antigen Antibody Combo; Future    5. Controlled substance agreement signed-    Patient Instructions   Lab in [address]- urine test today     Make lab only appointment   You need to be fasting for 12hrs. You can have water and any  "meds you are on. But, no food, coffee, tea or diet pop.    Consider straterra or vyvanse       COUNSELING:  Reviewed preventive health counseling, as reflected in patient instructions       Regular exercise       Healthy diet/nutrition       Osteoporosis Prevention/Bone Health    Estimated body mass index is 23.04 kg/m  as calculated from the following:    Height as of this encounter: 1.695 m (5' 6.75\").    Weight as of this encounter: 66.2 kg (146 lb).         reports that she has been smoking cigarettes. She has a 0.50 pack-year smoking history. She has never used smokeless tobacco.      Counseling Resources:  ATP IV Guidelines  Pooled Cohorts Equation Calculator  Breast Cancer Risk Calculator  FRAX Risk Assessment  ICSI Preventive Guidelines  Dietary Guidelines for Americans, 2010  USDA's MyPlate  ASA Prophylaxis  Lung CA Screening    [facility]  Answers for HPI/ROS submitted by the patient on 3/10/2020   Annual Exam:  NELSON 7 TOTAL SCORE: 6  If you checked off any problems, how difficult have these problems made it for you to do your work, take care of things at home, or get along with other people?: Somewhat difficult  PHQ9 TOTAL SCORE: 13    "

## 2020-03-11 DIAGNOSIS — F90.0 ATTENTION DEFICIT HYPERACTIVITY DISORDER (ADHD), PREDOMINANTLY INATTENTIVE TYPE: Primary | Chronic | ICD-10-CM

## 2020-03-11 ASSESSMENT — PATIENT HEALTH QUESTIONNAIRE - PHQ9: SUM OF ALL RESPONSES TO PHQ QUESTIONS 1-9: 13

## 2020-03-11 ASSESSMENT — ANXIETY QUESTIONNAIRES: GAD7 TOTAL SCORE: 6

## 2020-03-18 ENCOUNTER — OFFICE VISIT (OUTPATIENT)
Dept: PSYCHOLOGY | Facility: CLINIC | Age: 30
End: 2020-03-18
Payer: COMMERCIAL

## 2020-03-18 VITALS
BODY MASS INDEX: 23.13 KG/M2 | TEMPERATURE: 97.9 F | WEIGHT: 146.6 LBS | OXYGEN SATURATION: 99 % | DIASTOLIC BLOOD PRESSURE: 73 MMHG | SYSTOLIC BLOOD PRESSURE: 102 MMHG | HEART RATE: 64 BPM

## 2020-03-18 DIAGNOSIS — F32.A DEPRESSION, UNSPECIFIED DEPRESSION TYPE: Primary | ICD-10-CM

## 2020-03-18 DIAGNOSIS — F90.0 ATTENTION DEFICIT HYPERACTIVITY DISORDER (ADHD), PREDOMINANTLY INATTENTIVE TYPE: Chronic | ICD-10-CM

## 2020-03-18 DIAGNOSIS — Z79.899 CONTROLLED SUBSTANCE AGREEMENT SIGNED: ICD-10-CM

## 2020-03-18 LAB
AMPHETAMINES UR QL: ABNORMAL NG/ML
BARBITURATES UR QL SCN: NOT DETECTED NG/ML
BENZODIAZ UR QL SCN: NOT DETECTED NG/ML
BUPRENORPHINE UR QL: NOT DETECTED NG/ML
CANNABINOIDS UR QL: NOT DETECTED NG/ML
COCAINE UR QL SCN: NOT DETECTED NG/ML
D-METHAMPHET UR QL: NOT DETECTED NG/ML
METHADONE UR QL SCN: NOT DETECTED NG/ML
OPIATES UR QL SCN: NOT DETECTED NG/ML
OXYCODONE UR QL SCN: NOT DETECTED NG/ML
PCP UR QL SCN: NOT DETECTED NG/ML
PROPOXYPH UR QL: NOT DETECTED NG/ML
TRICYCLICS UR QL SCN: NOT DETECTED NG/ML

## 2020-03-18 PROCEDURE — 90792 PSYCH DIAG EVAL W/MED SRVCS: CPT | Performed by: PSYCHIATRY & NEUROLOGY

## 2020-03-18 PROCEDURE — 80306 DRUG TEST PRSMV INSTRMNT: CPT | Performed by: PSYCHIATRY & NEUROLOGY

## 2020-03-18 RX ORDER — CHOLECALCIFEROL (VITAMIN D3) 50 MCG
1 TABLET ORAL DAILY
COMMUNITY
End: 2021-06-15

## 2020-03-18 RX ORDER — BUPROPION HYDROCHLORIDE 150 MG/1
150 TABLET ORAL EVERY MORNING
Qty: 30 TABLET | Refills: 1 | Status: SHIPPED | OUTPATIENT
Start: 2020-03-18 | End: 2020-04-22

## 2020-03-18 RX ORDER — BUPROPION HYDROCHLORIDE 150 MG/1
150 TABLET ORAL EVERY MORNING
Qty: 30 TABLET | Refills: 1 | Status: SHIPPED | OUTPATIENT
Start: 2020-03-18 | End: 2020-03-18

## 2020-03-18 RX ORDER — MULTIVITAMIN,THERAPEUTIC
1 TABLET ORAL DAILY
COMMUNITY

## 2020-03-18 RX ORDER — DEXTROAMPHETAMINE SACCHARATE, AMPHETAMINE ASPARTATE, DEXTROAMPHETAMINE SULFATE AND AMPHETAMINE SULFATE 1.25; 1.25; 1.25; 1.25 MG/1; MG/1; MG/1; MG/1
5 TABLET ORAL DAILY PRN
Qty: 30 TABLET | Refills: 0 | COMMUNITY
Start: 2020-03-18 | End: 2020-04-13

## 2020-03-18 RX ORDER — DEXTROAMPHETAMINE SACCHARATE, AMPHETAMINE ASPARTATE, DEXTROAMPHETAMINE SULFATE AND AMPHETAMINE SULFATE 3.75; 3.75; 3.75; 3.75 MG/1; MG/1; MG/1; MG/1
15 TABLET ORAL 2 TIMES DAILY
Qty: 60 TABLET | Refills: 0 | COMMUNITY
Start: 2020-03-18 | End: 2020-04-13

## 2020-03-18 SDOH — HEALTH STABILITY: MENTAL HEALTH: HOW MANY STANDARD DRINKS CONTAINING ALCOHOL DO YOU HAVE ON A TYPICAL DAY?: 3 OR 4

## 2020-03-18 SDOH — HEALTH STABILITY: MENTAL HEALTH: HOW OFTEN DO YOU HAVE 6 OR MORE DRINKS ON ONE OCCASION?: NEVER

## 2020-03-18 SDOH — HEALTH STABILITY: MENTAL HEALTH: HOW OFTEN DO YOU HAVE A DRINK CONTAINING ALCOHOL?: 2-3 TIMES A WEEK

## 2020-03-18 ASSESSMENT — ANXIETY QUESTIONNAIRES
3. WORRYING TOO MUCH ABOUT DIFFERENT THINGS: NOT AT ALL
1. FEELING NERVOUS, ANXIOUS, OR ON EDGE: NOT AT ALL
GAD7 TOTAL SCORE: 3
6. BECOMING EASILY ANNOYED OR IRRITABLE: MORE THAN HALF THE DAYS
7. FEELING AFRAID AS IF SOMETHING AWFUL MIGHT HAPPEN: NOT AT ALL
IF YOU CHECKED OFF ANY PROBLEMS ON THIS QUESTIONNAIRE, HOW DIFFICULT HAVE THESE PROBLEMS MADE IT FOR YOU TO DO YOUR WORK, TAKE CARE OF THINGS AT HOME, OR GET ALONG WITH OTHER PEOPLE: NOT DIFFICULT AT ALL
2. NOT BEING ABLE TO STOP OR CONTROL WORRYING: SEVERAL DAYS
5. BEING SO RESTLESS THAT IT IS HARD TO SIT STILL: NOT AT ALL

## 2020-03-18 ASSESSMENT — PATIENT HEALTH QUESTIONNAIRE - PHQ9
SUM OF ALL RESPONSES TO PHQ QUESTIONS 1-9: 14
5. POOR APPETITE OR OVEREATING: NOT AT ALL

## 2020-03-18 NOTE — PATIENT INSTRUCTIONS
Start Wellbutrin  mg.    Change Adderall dosing to 15 mg twice daily and 5 mg in the afternoon if needed.    Continue all other medications per your primary care provider.    Schedule an appointment with me in 4 weeks or sooner as needed.  You may call [facility] at [phone] to schedule, or schedule at the .    [place] Resources:      Go to the Emergency Department as needed or call after hours crisis line at [phone].      To schedule individual or family therapy, call [facility] at [phone].     Follow up with primary care provider as planned or sooner for acute medical concerns.    Call the psychiatric nurse line with medication questions or concerns at [phone].    [employer] may be used to communicate with your provider, but this is not intended to be used for emergencies.    Community Resources:      National Suicide Prevention Lifeline: 277.571.6150 (TTY: 188.862.6778). Call anytime for help.  (www.suicidepreventionlifeline.org)    National [place] on Mental Illness (www.reid.org): 648.732.9304 or 163-631-2995.     Mental Health Association (www.mentalhealth.org): 634.929.6450 or 917-131-7728.    [place] Crisis Text Line: Text [place] to 613862    Suicide LifeLine Chat: suicidepreConvey Computerline.org/chat

## 2020-03-18 NOTE — PROGRESS NOTES
"                                                         Outpatient Psychiatric Evaluation- Standard  Adult    Name:  Gerri Rosado  : 1990    Source of Referral:  Primary Care Provider: Mary Ann Aguilera MD   Current Psychotherapist: none    Identifying Data:  Patient is a 29 year old, in a relationship   White  female  who presents for initial visit.  Patient attended the session alone. Patient prefers to be called Gerri .    Consent for treatment signed and included in electronic medical record.  My Practice Policy was reviewed and signed.     Chief Complaint:  \"Side effects are becoming a nuisance.  May be decrease dose or switch to non-stimulant?  Did not fully pass drug test, referred to y'all.\"    HPI:  Currently reports that she had a urine drug screen that was positive for marijuana, although she denies using marijuana or being exposed to marijuana.  She has in the past occasionally used CBD oil, and is wondering if that could have caused a positive screen.  We agreed to recheck her urine today.  Because of the positive screen, her primary care provider is not comfortable continuing to prescribe Adderall, so referred her to psychiatry.  Currently is wondering about the possibility of changing her Adderall prescription.    She was apparently diagnosed with attention deficit disorder after doing some psychological testing in her early 20s.  She had returned to school and was struggling with the college experience.  She reports that she believes her father has attention deficit disorder, and throughout school her school psychologist had encouraged her to be evaluated for attention deficit disorder but she did not.  She says that school was very difficult for her.  She generally got C's, but even that was a struggle for her.  Since being prescribed Adderall, she has been able to complete college and recently finished a masters degree in marriage and family therapy.    Prior to her " "attention deficit disorder diagnosis, she reports that she had severe anxiety and was started on citalopram.  She felt it was somewhat helpful, but she was still \"not great.  After she started Adderall, remembers being also had some individual therapy for about a year, mostly building skills such as wearing a watch and keeping a calendar.    Gerri had some anxiety as a child.  She remembers being worried about school, and felt she was not intelligent because of her struggles with schoolwork.  She did not have any treatment at that time.  She reports that she was \"an emo kid.\"  She remembers frequently staying up late, but does not remember being depressed.    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  She has just been building her confidence that \"what she is saying is important and has a point.\"    Psychiatric Review of Symptoms:  Depression: Gerri reports feeling fatigued, down, and agitated.  She does not feel sad, but feels \"more angry.\"  This is been going on since December 2019.  She reports decreased motivation and energy.  It is hard for her to do self-care such as preparing healthy meals and doing laundry.  She spends a lot of time just sitting and doing nothing.  She denies any suicidal thoughts.     Mood rating (1 to 10 with 10 being the best): 5   PHQ-9 scores:   PHQ-9 SCORE 1/27/2014 3/10/2020 3/18/2020   PHQ-9 Total Score 3 - -   PHQ-9 Total Score MyChart - 13 (Moderate depression) -   PHQ-9 Total Score - 13 14       Karen: No history of manic episodes.   MDQ Score: Not completed    Anxiety: She denies feeling anxious on a regular basis, but yesterday started to worry about her finances.  She does endorse feeling irritable and easily annoyed.   NELSON-7 scores:    NELSON-7 SCORE " 3/13/2019 3/10/2020 3/18/2020   Total Score - - -   Total Score - 6 (mild anxiety) -   Total Score 6 6 3       Panic: No history of panic attacks.    PTSD: No history of life-threatening trauma.    OCD: No history of obsessive thoughts or compulsive behaviors.    Psychosis: No history of auditory or visual hallucinations, paranoid ideations, ideas of reference, or thought insertion or extraction.    Impulse control: No history of gambling, shoplifting, or violent outbursts.    Eating Disorder: No history of binging, purging, or restricting calories.    Psychiatric History:   No previous psychiatric hospitalizations    No history of chemical dependency treatment    Suicide Risk Assessment:  Suicide Attempts: No   Self-injurious Behavior: Denies    Past Medical History:  Medical history:   Past Medical History:   Diagnosis Date     Abnormal Pap smear of cervix 03/19/2007    see problem list     Anxiety      Cervical high risk HPV (human papillomavirus) test positive 03/19/2007    see problem list     Controlled substance agreement signed- reviewed-no concerns 2/22/19 3/18/2020     Pap smear for cervical cancer screening 2008    biopsy taken and ok       Surgical history:   Past Surgical History:   Procedure Laterality Date     NO HISTORY OF SURGERY         Pregnancy status: Not pregnant    Trauma: No  history of head trauma or seizures.    Review of Systems:  10 systems (general, cardiovascular, respiratory, eyes, ENT, endocrine, GI, , M/S, neurological) were reviewed. All systems are this visit through the telephone unremarkable.    Current Medications:    Current Outpatient Medications:      amphetamine-dextroamphetamine (ADDERALL) 10 MG tablet, Take 1 tablet (10 mg) by mouth daily, Disp: 30 tablet, Rfl: 0     amphetamine-dextroamphetamine (ADDERALL) 30 MG tablet, Take 1 tablet (30 mg) by mouth daily, Disp: 30 tablet, Rfl: 0     buPROPion (WELLBUTRIN XL) 150 MG 24 hr tablet, Take 1 tablet (150 mg) by mouth  every morning, Disp: 30 tablet, Rfl: 1     levonorgestrel (MIRENA) 20 MCG/24HR IUD, 1 each by Intrauterine route once, Disp: , Rfl:      multivitamin, therapeutic (THERA-VIT) TABS tablet, Take 1 tablet by mouth daily, Disp: , Rfl:      vitamin D3 (CHOLECALCIFEROL) 2000 units (50 mcg) tablet, Take 1 tablet by mouth daily, Disp: , Rfl:     Supplements: Reviewed per Electronic Medical Record Today    The [place] Prescription Monitoring Program has been reviewed and there are no concerns about diversionary activity for controlled substances at this time.    Past medication trials include but are not limited to:   New Antidepressants:  Celexa (citalopram)  Stimulants / ADHD Meds: Adderall (amphetamine salts)    Allergies:  No known allergies    Vital Signs:  Vitals: /73 (BP Location: Right arm, Patient Position: Chair, Cuff Size: Adult Regular)   Pulse 64   Temp 97.9  F (36.6  C) (Oral)   Wt 66.5 kg (146 lb 9.6 oz)   SpO2 99%   BMI 23.13 kg/m      Labs:  Most recent laboratory results reviewed and the pertinent results include:   3/10/20  12:31 PM  [id number]     Component  Value  Flag  Ref Range  Units  Status  Collected  Lab    Cannabinoids (38-ezm-0-carboxy-9-THC) Abnormal      NDET^Not Detected  ng/mL  Final  03/10/2020 12:31 PM  79    Detected, Abnormal Result    Comment:    Cutoff for a positive cannabinoid is greater than 50 ng/ml.   This is an unconfirmed screening result to be used for medical purposes only.   Order [id number] for confirmation or individual confirmation tests to [facility].    Phencyclidine (Phencyclidine)  Not Detected   NDET^Not Detected  ng/mL  Final  03/10/2020 12:31 PM  79    Comment:    Cutoff for a negative PCP is 25 ng/mL or less.    Cocaine (Benzoylecgonine)  Not Detected   NDET^Not Detected  ng/mL  Final  03/10/2020 12:31 PM  79    Comment:    Cutoff for a negative cocaine is 150 ng/ml or less.    Methamphetamine (d-Methamphetamine)  Not Detected   NDET^Not Detected  ng/mL  Final   03/10/2020 12:31 PM  79    Comment:    Cutoff for a negative methamphetamine is 500 ng/ml or less.    Opiates (Morphine)  Not Detected   NDET^Not Detected  ng/mL  Final  03/10/2020 12:31 PM  79    Comment:    Cutoff for a negative opiate is 100 ng/ml or less.    Amphetamine (d-Amphetamine) Abnormal      NDET^Not Detected  ng/mL  Final  03/10/2020 12:31 PM  79    Detected, Abnormal Result    Comment:    Cutoff for a positive amphetamine is greater than 500 ng/ml.   This is an unconfirmed screening result to be used for medical purposes only.   Order [id number] for confirmation or individual confirmation tests to [facility].    Benzodiazepines (Nordiazepam)  Not Detected   NDET^Not Detected  ng/mL  Final  03/10/2020 12:31 PM  79    Comment:    Cutoff for a negative benzodiazepine is 150 ng/ml or less.    Tricyclic Antidepressants (Desipramine)  Not Detected   NDET^Not Detected  ng/mL  Final  03/10/2020 12:31 PM  79    Comment:    Cutoff for a negative tricyclic antidepressant is 300 ng/ml or less.    Methadone (Methadone)  Not Detected   NDET^Not Detected  ng/mL  Final  03/10/2020 12:31 PM  79    Comment:    Cutoff for a negative methadone is 200 ng/ml or less.    Barbiturates (Butalbital)  Not Detected   NDET^Not Detected  ng/mL  Final  03/10/2020 12:31 PM  79    Comment:    Cutoff for a negative barbituate is 200 ng/ml or less.    Oxycodone (Oxycodone)  Not Detected   NDET^Not Detected  ng/mL  Final  03/10/2020 12:31 PM  79    Comment:    Cutoff for a negative Oxycodone is 100 ng/mL or less.    Propoxyphene (Norpropoxyphene)  Not Detected   NDET^Not Detected  ng/mL  Final  03/10/2020 12:31 PM  79    Comment:    Cutoff for a negative propoxyphene is 300 ng/ml or less    Buprenorphine (Buprenorphine)  Not Detected   NDET^Not Detected  ng/mL  Final  03/10/2020 12:31 PM  79    Comment:    Cutoff for a negative buprenorphine is 10 ng/ml or less      TSH   Date Value Ref Range Status   07/19/2013 1.86 0.4 - 5.0 mU/L Final      Lab Results   Component Value Date    WBC 5.4 2013     Lab Results   Component Value Date    RBC 4.74 2013     Lab Results   Component Value Date    HGB 14.6 2013     Lab Results   Component Value Date    HCT 43.4 2013     Lab Results   Component Value Date    MCV 92 2013     Lab Results   Component Value Date    MCH 30.8 2013     Lab Results   Component Value Date    MCHC 33.6 2013     Lab Results   Component Value Date    RDW 13.0 2013     Lab Results   Component Value Date     2013     Most recent EKG none    Substance Use History:  She reports smoking 1 or 2 cigarettes a day.  She has cut down on her alcohol use.  In the past, she drank a couple of times a day and 3 or 4 beverages per occasion.  At present, she is not using alcohol.  She denies illicit drug use including marijuana.  She reports that in the past when she did use marijuana she became very paranoid.  Social History     Tobacco Use     Smoking status: Current Some Day Smoker     Packs/day: 0.10     Years: 5.00     Pack years: 0.50     Types: Cigarettes     Last attempt to quit: 2012     Years since quittin.2     Smokeless tobacco: Never Used   Substance Use Topics     Alcohol use: Yes     Frequency: 2-3 times a week     Drinks per session: 3 or 4     Binge frequency: Never     Comment: Stopped drinking last week 3/18/20     Drug use: No      Caffeine: 1 cup of coffee occasionally  Family History:   Childhood: No history of abuse or neglect.  Current Living situation: She lives with her partner, feels safe at home.  Patient reported family history includes:   Family History   Problem Relation Age of Onset     Cancer Mother         thyroid     Hypertension Father      Lipids Father      Attention Deficit Disorder Father      Breast Cancer Other      Depression Other      Anxiety Disorder Other      Mental Illness Other      Substance Abuse Other         Most of father's side      Mental Illness Maternal Grandmother         Borderline personality       Developmental History:  She reports struggling in school.  She mostly got C's throughout her school career, and was diagnosed with attention deficit disorder as a young adult when she went back to college and was struggling.    Social History:   Interpersonal: Not explored  Highest education level was masters degree in marriage and family therapy.  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  Legal : None  Firearms/Weapons Access: No: Patient denies   Service: No    Mental Status Examination:     Sheri is a 29-year-old woman who appears her stated age and in no acute distress.  She is neatly groomed in casual clothing.  Speech is clear and normal in rate and tone.  Eye contact is good.  Motor behavior is appropriate.  Affect is blunted.  Mood is mildly dysphoric, she identifies feeling angry more than sad.  Thoughts are logical and spontaneous with no loose associations or flight of ideas.  Thought content shows no psychosis.  She denies suicidal or homicidal thoughts.    Sensorium is clear.  She is oriented to person, place, and time.  Memory appears to be intact for immediate, recent, and remote events.  Intelligence is estimated to be high average.  Abstractive ability appears to be intact.  Personal insight is good.  Personal and impersonal judgment are intact.    Assessment and Plan:  Gerri is a 29-year-old woman who was referred to psychiatry after failing a urine drug screen per a controlled substance agreement for Adderall.  She is wondering about changing medications or possibly changing her dose.  In addition, she reports symptoms of depression, although she denies feeling sad, but more angry. 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      ICD-10-CM    1. Depression, unspecified depression type  F32.9 buPROPion  (WELLBUTRIN XL) 150 MG 24 hr tablet   2. ADHD, inattentive type  F90.0        Medical Comorbidities Include: See above    Patient Strengths:   Gerri  identified the following strengths: exercise routine, insight and intelligence.     Treatment Plan:  We had a long discussion about Adderall.  She was open to having second urine drug screen.  Assuming it is negative, we will continue Adderall.  We had a long discussion about dosing, and agreed to change her current regimen to 15 mg twice a day with an additional 5 mg in the evening if needed.  She has tried extended release Adderall in the past but did not find it to be effective.    In addition, we will try Wellbutrin for her mood.  Risks and benefits were reviewed, alternatives were discussed.  She has tried a couple SSRIs in the past and did not feel they were helpful.    Patient Instructions   Start Wellbutrin  mg.    Change Adderall dosing to 15 mg twice daily and 5 mg in the afternoon if needed.    Continue all other medications per your primary care provider.    Schedule an appointment with me in 4 weeks or sooner as needed.  You may call [facility] at [phone] to schedule, or schedule at the .    [facility] Resources:      Go to the Emergency Department as needed or call after hours crisis line at [phone].      To schedule individual or family therapy, call [facility] at [phone].     Follow up with primary care provider as planned or sooner for acute medical concerns.    Call the psychiatric nurse line with medication questions or concerns at [phone].    [employer] may be used to communicate with your provider, but this is not intended to be used for emergencies.    Community Resources:      National Suicide Prevention Lifeline: 136.597.6499 (TTY: 861.511.5743). Call anytime for help.  (www.suicidepreventionlifeline.org)    National [place] on Mental Illness (www.reid.org): 770.704.9195 or  239.705.3975.     Mental Health Association (www.mentalhealth.org): 433-955-2193 or 280-354-0967.    [place] Crisis Text Line: Text [place] to 700912    Suicide LifeLine Chat: suicidepreventionlifeline.org/chat       Patient Status:  Patient will continue to be seen for ongoing consultation and stabilization.

## 2020-03-19 ASSESSMENT — ANXIETY QUESTIONNAIRES: GAD7 TOTAL SCORE: 3

## 2020-04-13 DIAGNOSIS — Z79.899 CONTROLLED SUBSTANCE AGREEMENT SIGNED: ICD-10-CM

## 2020-04-13 DIAGNOSIS — F90.0 ATTENTION DEFICIT HYPERACTIVITY DISORDER (ADHD), PREDOMINANTLY INATTENTIVE TYPE: Chronic | ICD-10-CM

## 2020-04-13 RX ORDER — DEXTROAMPHETAMINE SACCHARATE, AMPHETAMINE ASPARTATE, DEXTROAMPHETAMINE SULFATE AND AMPHETAMINE SULFATE 1.25; 1.25; 1.25; 1.25 MG/1; MG/1; MG/1; MG/1
5 TABLET ORAL DAILY PRN
Qty: 30 TABLET | Refills: 0 | Status: SHIPPED | OUTPATIENT
Start: 2020-04-13 | End: 2020-04-22

## 2020-04-13 RX ORDER — DEXTROAMPHETAMINE SACCHARATE, AMPHETAMINE ASPARTATE, DEXTROAMPHETAMINE SULFATE AND AMPHETAMINE SULFATE 3.75; 3.75; 3.75; 3.75 MG/1; MG/1; MG/1; MG/1
15 TABLET ORAL 2 TIMES DAILY
Qty: 60 TABLET | Refills: 0 | Status: SHIPPED | OUTPATIENT
Start: 2020-04-13 | End: 2020-04-22

## 2020-04-13 NOTE — TELEPHONE ENCOUNTER
Reason for call:  Medication   If this is a refill request, has the caller requested the refill from the pharmacy already? Yes  Will the patient be using a [facility]? No  Name of the pharmacy and phone number for the current request:[id number] - [place], [place] - [address]     Name of the medication requested: Adderall    Other request: Pt tried to fill prescription and pharamacy said they never received it. Pt ran out today.    Phone number to reach patient:  Home number on file [phone] (home)    Best Time:  Anytime    Can we leave a detailed message on this number?  YES    Travel screening: Not Applicable

## 2020-04-13 NOTE — TELEPHONE ENCOUNTER
Refill for: amphetamine-dextroamphetamine (ADDERALL) 15 MG tablet     Last Appointment: 3/18/20    Next Appointment: 4/22/20    No Shows/Cancellations since last appointment: none    Last Refill in Epic (date and amount/how many days):    Disp  Refills  Start  End  MALCOLM    amphetamine-dextroamphetamine (ADDERALL) 15 MG tablet  60 tablet  0  3/18/2020   No    Sig - Route: Take 1 tablet (15 mg) by mouth 2 times daily - Oral       reviewed and summarized below:       Last office visit note reviewed and summarized below:      Medication pended.  Routing to provider for review.      Marie Singh, RN    Nurse Liaison  [facility]

## 2020-04-22 ENCOUNTER — VIRTUAL VISIT (OUTPATIENT)
Dept: PSYCHOLOGY | Facility: CLINIC | Age: 30
End: 2020-04-22
Payer: COMMERCIAL

## 2020-04-22 DIAGNOSIS — F32.A DEPRESSION, UNSPECIFIED DEPRESSION TYPE: ICD-10-CM

## 2020-04-22 DIAGNOSIS — F90.0 ATTENTION DEFICIT HYPERACTIVITY DISORDER (ADHD), PREDOMINANTLY INATTENTIVE TYPE: Chronic | ICD-10-CM

## 2020-04-22 PROCEDURE — 99443 ZZC PHYSICIAN TELEPHONE EVALUATION 21-30 MIN: CPT | Mod: 95 | Performed by: PSYCHIATRY & NEUROLOGY

## 2020-04-22 RX ORDER — BUPROPION HYDROCHLORIDE 150 MG/1
150 TABLET ORAL EVERY MORNING
Qty: 30 TABLET | Refills: 2 | Status: SHIPPED | OUTPATIENT
Start: 2020-04-22 | End: 2020-07-07

## 2020-04-22 RX ORDER — DEXTROAMPHETAMINE SACCHARATE, AMPHETAMINE ASPARTATE, DEXTROAMPHETAMINE SULFATE AND AMPHETAMINE SULFATE 1.25; 1.25; 1.25; 1.25 MG/1; MG/1; MG/1; MG/1
5 TABLET ORAL DAILY PRN
Qty: 30 TABLET | Refills: 0 | Status: SHIPPED | OUTPATIENT
Start: 2020-04-22 | End: 2020-06-13

## 2020-04-22 RX ORDER — DEXTROAMPHETAMINE SACCHARATE, AMPHETAMINE ASPARTATE, DEXTROAMPHETAMINE SULFATE AND AMPHETAMINE SULFATE 3.75; 3.75; 3.75; 3.75 MG/1; MG/1; MG/1; MG/1
15 TABLET ORAL 2 TIMES DAILY
Qty: 60 TABLET | Refills: 0 | Status: SHIPPED | OUTPATIENT
Start: 2020-04-22 | End: 2020-06-13

## 2020-04-22 ASSESSMENT — ANXIETY QUESTIONNAIRES
IF YOU CHECKED OFF ANY PROBLEMS ON THIS QUESTIONNAIRE, HOW DIFFICULT HAVE THESE PROBLEMS MADE IT FOR YOU TO DO YOUR WORK, TAKE CARE OF THINGS AT HOME, OR GET ALONG WITH OTHER PEOPLE: NOT DIFFICULT AT ALL
3. WORRYING TOO MUCH ABOUT DIFFERENT THINGS: NOT AT ALL
6. BECOMING EASILY ANNOYED OR IRRITABLE: SEVERAL DAYS
GAD7 TOTAL SCORE: 2
5. BEING SO RESTLESS THAT IT IS HARD TO SIT STILL: NOT AT ALL
2. NOT BEING ABLE TO STOP OR CONTROL WORRYING: NOT AT ALL
1. FEELING NERVOUS, ANXIOUS, OR ON EDGE: NOT AT ALL
7. FEELING AFRAID AS IF SOMETHING AWFUL MIGHT HAPPEN: SEVERAL DAYS

## 2020-04-22 ASSESSMENT — PATIENT HEALTH QUESTIONNAIRE - PHQ9
5. POOR APPETITE OR OVEREATING: NOT AT ALL
SUM OF ALL RESPONSES TO PHQ QUESTIONS 1-9: 6

## 2020-04-22 NOTE — PATIENT INSTRUCTIONS
Continue Wellbutrin  mg daily.    Continue Adderall 15 mg twice daily and 5 mg daily as needed.    Continue all other medications per your primary care provider.    Schedule an appointment with me in 6 weeks or sooner as needed.  You may call [facility] at [phone] to schedule, or schedule at the .    [facility] Resources:      Go to the Emergency Department as needed or call after hours crisis line at [phone].      To schedule individual or family therapy, call [facility] at [phone].     Follow up with primary care provider as planned or sooner for acute medical concerns.    Call the psychiatric nurse line with medication questions or concerns at [phone].    [employer] may be used to communicate with your provider, but this is not intended to be used for emergencies.    Community Resources:      National Suicide Prevention Lifeline: 223.749.3623 (TTY: 728.685.8200). Call anytime for help.  (www.suicidepreventionlifeline.org)    National [place] on Mental Illness (www.reid.org): 953.286.6434 or 243-224-1104.     Mental Health Association (www.mentalhealth.org): 236.796.7570 or 167-287-8243.    [place] Crisis Text Line: Text [place] to 700675    Suicide LifeLine Chat: suicidepreventionlifeline.org/chat

## 2020-04-22 NOTE — PROGRESS NOTES
"Gerri Rosado is a 29 year old female who is being evaluated via a billable telephone visit.      The patient has been notified of following:     \"This telephone visit will be conducted via a call between you and your physician/provider. We have found that certain health care needs can be provided without the need for a physical exam.  This service lets us provide the care you need with a short phone conversation.  If a prescription is necessary we can send it directly to your pharmacy.  If lab work is needed we can place an order for that and you can then stop by our lab to have the test done at a later time.    Telephone visits are billed at different rates depending on your insurance coverage. During this emergency period, for some insurers they may be billed the same as an in-person visit.  Please reach out to your insurance provider with any questions.    If during the course of the call the physician/provider feels a telephone visit is not appropriate, you will not be charged for this service.\"    Patient has given verbal consent for Telephone visit?  Yes    How would you like to obtain your AVS? MyChart        Outpatient Psychiatric Progress Note    Name: Gerri Rosado   : 1990                    Primary Care Provider: Mary Ann Aguilera MD   Therapist: none    PHQ-9 scores:  PHQ-9 SCORE 3/10/2020 3/18/2020 2020   PHQ-9 Total Score - - -   PHQ-9 Total Score MyChart 13 (Moderate depression) - -   PHQ-9 Total Score 13 14 6       NELSON-7 scores:  NELSON-7 SCORE 3/10/2020 3/18/2020 2020   Total Score - - -   Total Score 6 (mild anxiety) - -   Total Score 6 3 2       Patient Identification:  Gerri is a 29 year old year old, in a relationship   White American female  who presents for return visit with me.  Patient attended the session by phone.     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  Unfortunately, she is not eligible for unemployment benefits.    She reports that her mood \"waxes and wanes.\"  She rates it today as 5 or 6 out of 10 with 10 being the best.  She was surprised to hear that her PHQ 9 score decreased from 14-6, and says she does not really remember how she felt a month or so ago.  She does feel she has more energy since starting Wellbutrin.  She is sleeping better.  She is smoking less, and has not had a cigarette in about 3 days.  She thinks that she is drinking a little less.      She denies any anxiety.  She denies adverse side effects from Wellbutrin.    Vital Signs:   There were no vitals taken for this visit.    Current Medications:  Current Outpatient Medications   Medication     amphetamine-dextroamphetamine (ADDERALL) 15 MG tablet     amphetamine-dextroamphetamine (ADDERALL) 5 MG tablet     buPROPion (WELLBUTRIN XL) 150 MG 24 hr tablet     levonorgestrel (MIRENA) 20 MCG/24HR IUD     multivitamin, therapeutic (THERA-VIT) TABS tablet     vitamin D3 (CHOLECALCIFEROL) 2000 units (50 mcg) tablet     No current facility-administered medications for this visit.       Mental Status Examination:  Sheri is a 29-year-old woman in no acute distress.  Speech is clear and normal in rate and tone.  Mood is fair.  Thoughts are logical and spontaneous with no loose associations or flight of ideas.  Thought content shows no psychosis.  No suicidal ideation.  She is alert and oriented x3.    Assessment and Plan:    ICD-10-CM    1. Depression, unspecified depression type  F32.9 buPROPion (WELLBUTRIN XL) 150 MG 24 hr tablet   2. ADHD, inattentive type  F90.0 amphetamine-dextroamphetamine (ADDERALL) 15 MG tablet     amphetamine-dextroamphetamine (ADDERALL) 5 MG tablet       Medical comorbidities include:   Patient Active Problem List "   Diagnosis     CARDIOVASCULAR SCREENING; LDL GOAL LESS THAN 160     Anxiety     Concentration deficit     ADHD, inattentive type     IUD (intrauterine device) in place     Controlled substance agreement broken     Depression, unspecified depression type       Treatment Plan:  Patient Instructions   Continue Wellbutrin  mg daily.    Continue Adderall 15 mg twice daily and 5 mg daily as needed.    Continue all other medications per your primary care provider.    Schedule an appointment with me in 6 weeks or sooner as needed.  You may call [facility] at [phone] to schedule, or schedule at the .    [facility] Resources:      Go to the Emergency Department as needed or call after hours crisis line at [phone].      To schedule individual or family therapy, call [facility] at [phone].     Follow up with primary care provider as planned or sooner for acute medical concerns.    Call the psychiatric nurse line with medication questions or concerns at [phone].    Workpopt may be used to communicate with your provider, but this is not intended to be used for emergencies.    Community Resources:      National Suicide Prevention Lifeline: 943.534.5978 (TTY: 304.401.1633). Call anytime for help.  (www.suicidepreventionlifeline.org)    National [place] on Mental Illness (www.reid.org): 503.545.4094 or 513-312-6192.     Mental Health Association (www.mentalhealth.org): 899.183.2217 or 425-798-0743.    [place] Crisis Text Line: Text [place] to 947778    Suicide LifeLine Chat: [email or web address] [email or web address].org/chat     Administrative Billing:   Phone call duration: 27 minutes, greater than 50% spent in counseling regarding current symptoms, treatment options, and medications.    Patient Status:  This is a continuous care patient and medications will be prescribed by the psychiatric provider until further indicated.

## 2020-04-23 ASSESSMENT — ANXIETY QUESTIONNAIRES: GAD7 TOTAL SCORE: 2

## 2020-06-13 ENCOUNTER — MYC REFILL (OUTPATIENT)
Dept: PSYCHOLOGY | Facility: CLINIC | Age: 30
End: 2020-06-13

## 2020-06-13 DIAGNOSIS — F90.0 ATTENTION DEFICIT HYPERACTIVITY DISORDER (ADHD), PREDOMINANTLY INATTENTIVE TYPE: Chronic | ICD-10-CM

## 2020-06-15 RX ORDER — DEXTROAMPHETAMINE SACCHARATE, AMPHETAMINE ASPARTATE, DEXTROAMPHETAMINE SULFATE AND AMPHETAMINE SULFATE 1.25; 1.25; 1.25; 1.25 MG/1; MG/1; MG/1; MG/1
5 TABLET ORAL DAILY PRN
Qty: 30 TABLET | Refills: 0 | Status: SHIPPED | OUTPATIENT
Start: 2020-06-15 | End: 2020-07-07

## 2020-06-15 RX ORDER — DEXTROAMPHETAMINE SACCHARATE, AMPHETAMINE ASPARTATE, DEXTROAMPHETAMINE SULFATE AND AMPHETAMINE SULFATE 3.75; 3.75; 3.75; 3.75 MG/1; MG/1; MG/1; MG/1
15 TABLET ORAL 2 TIMES DAILY
Qty: 60 TABLET | Refills: 0 | Status: SHIPPED | OUTPATIENT
Start: 2020-06-15 | End: 2020-07-07

## 2020-06-15 NOTE — TELEPHONE ENCOUNTER
Refill for: amphetamine-dextroamphetamine (ADDERALL) 15 MG tablet                  amphetamine-dextroamphetamine (ADDERALL) 5 MG tablet    Last Appointment: 4/22/20    Next Appointment: none    No Shows/Cancellations since last appointment: none    Last Refill in Epic (date and amount/how many days):    Disp  Refills  Start  End  MALCOLM    amphetamine-dextroamphetamine (ADDERALL) 15 MG tablet  60 tablet  0  4/22/2020   No    Sig - Route: Take 1 tablet (15 mg) by mouth 2 times daily - Oral       Disp  Refills  Start  End  MALCOLM    amphetamine-dextroamphetamine (ADDERALL) 5 MG tablet  30 tablet  0  4/22/2020   No    Sig - Route: Take 1 tablet (5 mg) by mouth daily as needed (poor attention) - Oral       reviewed and summarized below:       Last office visit note reviewed and summarized below:  Treatment Plan:  Patient Instructions   Continue Wellbutrin  mg daily.     Continue Adderall 15 mg twice daily and 5 mg daily as needed.     Continue all other medications per your primary care provider.     Schedule an appointment with me in 6 weeks or sooner as needed.  You may call [facility] at [phone] to schedule, or schedule at the .      Medication pended.  Routing to provider for review.    Patient notified to schedule follow up.    Marie Singh, RN    Nurse Liaison  [facility]

## 2020-07-07 ENCOUNTER — VIRTUAL VISIT (OUTPATIENT)
Dept: PSYCHOLOGY | Facility: CLINIC | Age: 30
End: 2020-07-07
Payer: COMMERCIAL

## 2020-07-07 DIAGNOSIS — F90.0 ATTENTION DEFICIT HYPERACTIVITY DISORDER (ADHD), PREDOMINANTLY INATTENTIVE TYPE: Primary | Chronic | ICD-10-CM

## 2020-07-07 DIAGNOSIS — F32.A DEPRESSION, UNSPECIFIED DEPRESSION TYPE: ICD-10-CM

## 2020-07-07 PROCEDURE — 99214 OFFICE O/P EST MOD 30 MIN: CPT | Mod: 95 | Performed by: PSYCHIATRY & NEUROLOGY

## 2020-07-07 RX ORDER — DEXTROAMPHETAMINE SACCHARATE, AMPHETAMINE ASPARTATE, DEXTROAMPHETAMINE SULFATE AND AMPHETAMINE SULFATE 3.75; 3.75; 3.75; 3.75 MG/1; MG/1; MG/1; MG/1
15 TABLET ORAL 2 TIMES DAILY
Qty: 60 TABLET | Refills: 0 | Status: SHIPPED | OUTPATIENT
Start: 2020-07-07 | End: 2020-10-20

## 2020-07-07 RX ORDER — BUPROPION HYDROCHLORIDE 150 MG/1
150 TABLET ORAL EVERY MORNING
Qty: 90 TABLET | Refills: 1 | Status: SHIPPED | OUTPATIENT
Start: 2020-07-07 | End: 2020-10-20

## 2020-07-07 RX ORDER — DEXTROAMPHETAMINE SACCHARATE, AMPHETAMINE ASPARTATE, DEXTROAMPHETAMINE SULFATE AND AMPHETAMINE SULFATE 3.75; 3.75; 3.75; 3.75 MG/1; MG/1; MG/1; MG/1
15 TABLET ORAL 2 TIMES DAILY
Qty: 60 TABLET | Refills: 0 | Status: SHIPPED | OUTPATIENT
Start: 2020-07-07 | End: 2020-11-19

## 2020-07-07 RX ORDER — DEXTROAMPHETAMINE SACCHARATE, AMPHETAMINE ASPARTATE, DEXTROAMPHETAMINE SULFATE AND AMPHETAMINE SULFATE 1.25; 1.25; 1.25; 1.25 MG/1; MG/1; MG/1; MG/1
5 TABLET ORAL DAILY PRN
Qty: 30 TABLET | Refills: 0 | Status: SHIPPED | OUTPATIENT
Start: 2020-07-07 | End: 2020-11-19

## 2020-07-07 ASSESSMENT — PATIENT HEALTH QUESTIONNAIRE - PHQ9
5. POOR APPETITE OR OVEREATING: NOT AT ALL
SUM OF ALL RESPONSES TO PHQ QUESTIONS 1-9: 4

## 2020-07-07 ASSESSMENT — ANXIETY QUESTIONNAIRES
7. FEELING AFRAID AS IF SOMETHING AWFUL MIGHT HAPPEN: NOT AT ALL
3. WORRYING TOO MUCH ABOUT DIFFERENT THINGS: SEVERAL DAYS
GAD7 TOTAL SCORE: 4
5. BEING SO RESTLESS THAT IT IS HARD TO SIT STILL: NOT AT ALL
1. FEELING NERVOUS, ANXIOUS, OR ON EDGE: SEVERAL DAYS
2. NOT BEING ABLE TO STOP OR CONTROL WORRYING: SEVERAL DAYS
IF YOU CHECKED OFF ANY PROBLEMS ON THIS QUESTIONNAIRE, HOW DIFFICULT HAVE THESE PROBLEMS MADE IT FOR YOU TO DO YOUR WORK, TAKE CARE OF THINGS AT HOME, OR GET ALONG WITH OTHER PEOPLE: NOT DIFFICULT AT ALL
6. BECOMING EASILY ANNOYED OR IRRITABLE: SEVERAL DAYS

## 2020-07-07 NOTE — PROGRESS NOTES
"Gerri Rosado is a 29 year old female who is being evaluated via a billable telephone visit.      The patient has been notified of following:     \"This telephone visit will be conducted via a call between you and your physician/provider. We have found that certain health care needs can be provided without the need for a physical exam.  This service lets us provide the care you need with a short phone conversation.  If a prescription is necessary we can send it directly to your pharmacy.  If lab work is needed we can place an order for that and you can then stop by our lab to have the test done at a later time.    Telephone visits are billed at different rates depending on your insurance coverage. During this emergency period, for some insurers they may be billed the same as an in-person visit.  Please reach out to your insurance provider with any questions.    If during the course of the call the physician/provider feels a telephone visit is not appropriate, you will not be charged for this service.\"    Patient has given verbal consent for Telephone visit?  Yes    What phone number would you like to be contacted at? [phone]    How would you like to obtain your AVS? [facility]          Outpatient Psychiatric Progress Note    Name: Gerri Rosado   : 1990                    Primary Care Provider: Mary Ann Aguilera MD   Therapist: none     PHQ-9 scores:  PHQ-9 SCORE 3/18/2020 2020 2020   PHQ-9 Total Score - - -   PHQ-9 Total Score MyChart - - -   PHQ-9 Total Score 14 6 4       NELSON-7 scores:  NELSON-7 SCORE 3/18/2020 2020 2020   Total Score - - -   Total Score - - -   Total Score 3 2 4       Patient Identification:  Gerri is a 29 year old year old, in a relationship   White American female  who presents for return visit with me.  Patient attended the session alone via phone.     Interim History:  Currently reports that things are going well.  She is still stuck in the house, but " her mood is good.  She rates it as 7 out of 10 with 10 being the best.  She is sleeping better and is on a bit more regular schedule.  She says that she has had more motivation, and actually redecorated her porch so she has a pleasant place to sit when she cannot be outside.     She has essentially stopped smoking as a serendipitous side effect of taking Wellbutrin for her mood.    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    We discussed the possibility that she would be able to discharge back to her primary care provider.  Her only concern is whether or not the primary care provider will be comfortable prescribing Adderall given that she had a positive urine drug screen in the past.  My guess is that that was a false positive test, as we redid a urine drug screen and it was negative.  If primary care is not comfortable prescribing Adderall for her, she can continue to see me.    Vital Signs:   There were no vitals taken for this visit.    Current Medications:  Current Outpatient Medications   Medication     amphetamine-dextroamphetamine (ADDERALL) 15 MG tablet     amphetamine-dextroamphetamine (ADDERALL) 15 MG tablet     amphetamine-dextroamphetamine (ADDERALL) 15 MG tablet     amphetamine-dextroamphetamine (ADDERALL) 5 MG tablet     buPROPion (WELLBUTRIN XL) 150 MG 24 hr tablet     levonorgestrel (MIRENA) 20 MCG/24HR IUD     multivitamin, therapeutic (THERA-VIT) TABS tablet     vitamin D3 (CHOLECALCIFEROL) 2000 units (50 mcg) tablet     No current facility-administered medications for this visit.       Mental Status Examination:  Gerri is a 29-year-old woman in no acute distress.  Speech is clear and normal in rate and tone.  Mood is good.  Thoughts are logical and spontaneous with no loose associations or flight of ideas.  Thought content shows no psychosis.  No suicidal thoughts.  She is alert and oriented  x3.    Assessment and Plan:    ICD-10-CM    1. ADHD, inattentive type  F90.0 amphetamine-dextroamphetamine (ADDERALL) 15 MG tablet     amphetamine-dextroamphetamine (ADDERALL) 15 MG tablet     amphetamine-dextroamphetamine (ADDERALL) 15 MG tablet     amphetamine-dextroamphetamine (ADDERALL) 5 MG tablet   2. Depression, unspecified depression type  F32.9 buPROPion (WELLBUTRIN XL) 150 MG 24 hr tablet       Medical comorbidities include:   Patient Active Problem List   Diagnosis     CARDIOVASCULAR SCREENING; LDL GOAL LESS THAN 160     Anxiety     Concentration deficit     ADHD, inattentive type     IUD (intrauterine device) in place     Controlled substance agreement broken     Depression, unspecified depression type       Treatment Plan:  Patient Instructions   Continue Wellbutrin  mg daily.    Continue Adderall 15 mg twice daily with an additional 5 mg daily as needed.    Continue all other medications per your primary care provider.    I Per our conversation, you will be discharged from the [facility] psychiatry service, assuming that your primary care provider is comfortable prescribing your current medications.  If not, I will be happy to continue to see you on an intermittent basis.    [facility] Resources:      Go to the Emergency Department as needed or call after hours crisis line at [phone].      To schedule individual or family therapy, call [facility] at [phone].     Follow up with primary care provider as planned or sooner for acute medical concerns.    Call the psychiatric nurse line with medication questions or concerns at [phone].    Arigohart may be used to communicate with your provider, but this is not intended to be used for emergencies.    Community Resources:      National Suicide Prevention Lifeline: 327.239.2945 (TTY: 829.272.4068). Call anytime for help.  (www.suicidepreventionlifeline.org)    National [place] on Mental Illness (www.reid.org):  265-233-4161 or 671-141-7745.     Mental Health Association (www.mentalhealth.org): 294.321.3903 or 490-841-1396.    [place] Crisis Text Line: Text [place] to 207969    Suicide LifeLine Chat: suicidepreADstrucline.org/chat     Administrative Billing:   Phone call duration: 21 minutes    Patient Status:  The patient is being returned to the referring provider for ongoing care and medication prescribing.  The patient can be referred back to this service for further consultation as needed.

## 2020-07-07 NOTE — PATIENT INSTRUCTIONS
Continue Wellbutrin  mg daily.    Continue Adderall 15 mg twice daily with an additional 5 mg daily as needed.    Continue all other medications per your primary care provider.    I Per our conversation, you will be discharged from the [facility] psychiatry service, assuming that your primary care provider is comfortable prescribing your current medications.  If not, I will be happy to continue to see you on an intermittent basis.    [facility] Resources:      Go to the Emergency Department as needed or call after hours crisis line at [phone].      To schedule individual or family therapy, call [facility] at [phone].     Follow up with primary care provider as planned or sooner for acute medical concerns.    Call the psychiatric nurse line with medication questions or concerns at [phone].    [employer]hart may be used to communicate with your provider, but this is not intended to be used for emergencies.    Community Resources:      National Suicide Prevention Lifeline: 239.360.3679 (TTY: 928.549.1823). Call anytime for help.  (www.suicidepreventionlifeline.org)    National [place] on Mental Illness (www.reid.org): 583.375.4243 or 422-210-7379.     Mental Health Association (www.mentalhealth.org): 385.818.1795 or 762-356-4755.    [place] Crisis Text Line: Text [place] to 772279    Suicide LifeLine Chat: suicidepreArt [email or web address] the Dreamlifeline.org/chat

## 2020-07-07 NOTE — Clinical Note
Federico Reese, I have been seeing Gerri for the last few months for treatment of her attention deficit disorder and depression.  She had a positive urine drug screen for marijuana prior to seeing me, but denied that she had used marijuana.  We repeated the urine drug screen, and it was negative.  My main question today is whether or not you are comfortable taking her back and being responsible for her Adderall prescriptions.  If not, I will continue to see her on an occasional basis.  Please let me know one way or another, as I will discharge her back to you if you are comfortable with that.  Thank you for the opportunity to be involved in her care.  Please feel free to contact me regarding any questions or concerns.    Regards,  Tamara Cooper MD  Collaborative Care Psychiatry  [facility]

## 2020-07-08 ASSESSMENT — ANXIETY QUESTIONNAIRES: GAD7 TOTAL SCORE: 4

## 2020-08-25 DIAGNOSIS — F32.A DEPRESSION, UNSPECIFIED DEPRESSION TYPE: ICD-10-CM

## 2020-08-25 RX ORDER — BUPROPION HYDROCHLORIDE 150 MG/1
150 TABLET ORAL EVERY MORNING
Qty: 90 TABLET | Refills: 1 | OUTPATIENT
Start: 2020-08-25

## 2020-08-25 NOTE — TELEPHONE ENCOUNTER
Declined refill request, received 90 day supply 7/7 with one refill.     buPROPion (WELLBUTRIN XL) 150 MG 24 hr tablet  90 tablet  1  7/7/2020   No    Sig - Route: Take 1 tablet (150 mg) by mouth every morning - Oral    Sent to pharmacy as: buPROPion HCl ER (XL) 150 MG Oral Tablet Extended Release 24 Hour (WELLBUTRIN XL)    Class: E-Prescribe    Order: [id number]    E-Prescribing Status: Receipt confirmed by pharmacy (7/7/2020  9:32 AM CDT)

## 2020-08-27 DIAGNOSIS — F90.0 ATTENTION DEFICIT HYPERACTIVITY DISORDER (ADHD), PREDOMINANTLY INATTENTIVE TYPE: Chronic | ICD-10-CM

## 2020-08-27 DIAGNOSIS — Z11.4 ENCOUNTER FOR SCREENING FOR HIV: ICD-10-CM

## 2020-08-27 DIAGNOSIS — R41.840 CONCENTRATION DEFICIT: ICD-10-CM

## 2020-08-27 DIAGNOSIS — Z00.01 ENCOUNTER FOR GENERAL ADULT MEDICAL EXAMINATION WITH ABNORMAL FINDINGS: ICD-10-CM

## 2020-08-27 LAB
ALBUMIN SERPL-MCNC: 4.1 G/DL (ref 3.4–5)
ALP SERPL-CCNC: 63 U/L (ref 40–150)
ALT SERPL W P-5'-P-CCNC: 32 U/L (ref 0–50)
ANION GAP SERPL CALCULATED.3IONS-SCNC: 9 MMOL/L (ref 3–14)
AST SERPL W P-5'-P-CCNC: 21 U/L (ref 0–45)
BASOPHILS # BLD AUTO: 0 10E9/L (ref 0–0.2)
BASOPHILS NFR BLD AUTO: 0.6 %
BILIRUB SERPL-MCNC: 0.6 MG/DL (ref 0.2–1.3)
BUN SERPL-MCNC: 12 MG/DL (ref 7–30)
CALCIUM SERPL-MCNC: 8.6 MG/DL (ref 8.5–10.1)
CHLORIDE SERPL-SCNC: 110 MMOL/L (ref 94–109)
CHOLEST SERPL-MCNC: 164 MG/DL
CO2 SERPL-SCNC: 22 MMOL/L (ref 20–32)
CREAT SERPL-MCNC: 0.81 MG/DL (ref 0.52–1.04)
DIFFERENTIAL METHOD BLD: NORMAL
EOSINOPHIL # BLD AUTO: 0.1 10E9/L (ref 0–0.7)
EOSINOPHIL NFR BLD AUTO: 2.6 %
ERYTHROCYTE [DISTWIDTH] IN BLOOD BY AUTOMATED COUNT: 12.7 % (ref 10–15)
GFR SERPL CREATININE-BSD FRML MDRD: >90 ML/MIN/{1.73_M2}
GLUCOSE SERPL-MCNC: 78 MG/DL (ref 70–99)
HCT VFR BLD AUTO: 44 % (ref 35–47)
HDLC SERPL-MCNC: 65 MG/DL
HGB BLD-MCNC: 15.3 G/DL (ref 11.7–15.7)
LDLC SERPL CALC-MCNC: 79 MG/DL
LYMPHOCYTES # BLD AUTO: 1.4 10E9/L (ref 0.8–5.3)
LYMPHOCYTES NFR BLD AUTO: 29.9 %
MCH RBC QN AUTO: 31.5 PG (ref 26.5–33)
MCHC RBC AUTO-ENTMCNC: 34.8 G/DL (ref 31.5–36.5)
MCV RBC AUTO: 91 FL (ref 78–100)
MONOCYTES # BLD AUTO: 0.4 10E9/L (ref 0–1.3)
MONOCYTES NFR BLD AUTO: 8 %
NEUTROPHILS # BLD AUTO: 2.7 10E9/L (ref 1.6–8.3)
NEUTROPHILS NFR BLD AUTO: 58.9 %
NONHDLC SERPL-MCNC: 99 MG/DL
PLATELET # BLD AUTO: 292 10E9/L (ref 150–450)
POTASSIUM SERPL-SCNC: 4.1 MMOL/L (ref 3.4–5.3)
PROT SERPL-MCNC: 7.5 G/DL (ref 6.8–8.8)
RBC # BLD AUTO: 4.86 10E12/L (ref 3.8–5.2)
SODIUM SERPL-SCNC: 141 MMOL/L (ref 133–144)
TRIGL SERPL-MCNC: 99 MG/DL
TSH SERPL DL<=0.005 MIU/L-ACNC: 1.72 MU/L (ref 0.4–4)
WBC # BLD AUTO: 4.7 10E9/L (ref 4–11)

## 2020-08-27 PROCEDURE — 87389 HIV-1 AG W/HIV-1&-2 AB AG IA: CPT | Performed by: NURSE PRACTITIONER

## 2020-08-27 PROCEDURE — 36415 COLL VENOUS BLD VENIPUNCTURE: CPT | Performed by: NURSE PRACTITIONER

## 2020-08-27 PROCEDURE — 82306 VITAMIN D 25 HYDROXY: CPT | Performed by: NURSE PRACTITIONER

## 2020-08-27 PROCEDURE — 80061 LIPID PANEL: CPT | Performed by: NURSE PRACTITIONER

## 2020-08-27 PROCEDURE — 80050 GENERAL HEALTH PANEL: CPT | Performed by: NURSE PRACTITIONER

## 2020-08-28 LAB
DEPRECATED CALCIDIOL+CALCIFEROL SERPL-MC: 37 UG/L (ref 20–75)
HIV 1+2 AB+HIV1 P24 AG SERPL QL IA: NONREACTIVE

## 2020-10-20 ENCOUNTER — MYC REFILL (OUTPATIENT)
Dept: PSYCHOLOGY | Facility: CLINIC | Age: 30
End: 2020-10-20

## 2020-10-20 DIAGNOSIS — F90.0 ATTENTION DEFICIT HYPERACTIVITY DISORDER (ADHD), PREDOMINANTLY INATTENTIVE TYPE: Chronic | ICD-10-CM

## 2020-10-20 DIAGNOSIS — F32.A DEPRESSION, UNSPECIFIED DEPRESSION TYPE: ICD-10-CM

## 2020-10-20 RX ORDER — DEXTROAMPHETAMINE SACCHARATE, AMPHETAMINE ASPARTATE, DEXTROAMPHETAMINE SULFATE AND AMPHETAMINE SULFATE 3.75; 3.75; 3.75; 3.75 MG/1; MG/1; MG/1; MG/1
15 TABLET ORAL 2 TIMES DAILY
Qty: 60 TABLET | Refills: 0 | Status: CANCELLED | OUTPATIENT
Start: 2020-10-20

## 2020-10-22 NOTE — TELEPHONE ENCOUNTER
At the last office visit with psychiatry, July 7, 2020, the patient was advised to follow-up with the primary care.  I have not seen the patient for more than a year.  The patient has not made any attempt to schedule an appointment with me.     Once I see a future appointment, I will give her prescription until the appointment time

## 2020-10-23 ENCOUNTER — E-VISIT (OUTPATIENT)
Dept: INTERNAL MEDICINE | Facility: CLINIC | Age: 30
End: 2020-10-23
Payer: COMMERCIAL

## 2020-10-23 DIAGNOSIS — R30.0 DYSURIA: Primary | ICD-10-CM

## 2020-10-23 PROCEDURE — 99421 OL DIG E/M SVC 5-10 MIN: CPT | Performed by: INTERNAL MEDICINE

## 2020-10-26 RX ORDER — SULFAMETHOXAZOLE/TRIMETHOPRIM 800-160 MG
1 TABLET ORAL 2 TIMES DAILY
Qty: 6 TABLET | Refills: 0 | Status: SHIPPED | OUTPATIENT
Start: 2020-10-26 | End: 2021-01-05

## 2020-10-26 NOTE — TELEPHONE ENCOUNTER
Patient scheduled appt with PCP on 11/19.   Routing to PCP for consideration of bridge refill.    Marie Singh, RN    Nurse Liaison  [facility]

## 2020-10-27 RX ORDER — BUPROPION HYDROCHLORIDE 150 MG/1
150 TABLET ORAL EVERY MORNING
Qty: 30 TABLET | Refills: 0 | Status: SHIPPED | OUTPATIENT
Start: 2020-10-27 | End: 2020-11-19

## 2020-10-27 RX ORDER — DEXTROAMPHETAMINE SACCHARATE, AMPHETAMINE ASPARTATE, DEXTROAMPHETAMINE SULFATE AND AMPHETAMINE SULFATE 3.75; 3.75; 3.75; 3.75 MG/1; MG/1; MG/1; MG/1
15 TABLET ORAL 2 TIMES DAILY
Qty: 60 TABLET | Refills: 0 | Status: SHIPPED | OUTPATIENT
Start: 2020-10-27 | End: 2020-11-19

## 2020-11-19 ENCOUNTER — OFFICE VISIT (OUTPATIENT)
Dept: INTERNAL MEDICINE | Facility: CLINIC | Age: 30
End: 2020-11-19
Payer: COMMERCIAL

## 2020-11-19 VITALS
OXYGEN SATURATION: 99 % | RESPIRATION RATE: 16 BRPM | WEIGHT: 146 LBS | SYSTOLIC BLOOD PRESSURE: 102 MMHG | DIASTOLIC BLOOD PRESSURE: 68 MMHG | BODY MASS INDEX: 23.04 KG/M2 | HEART RATE: 102 BPM

## 2020-11-19 DIAGNOSIS — F90.0 ATTENTION DEFICIT HYPERACTIVITY DISORDER (ADHD), PREDOMINANTLY INATTENTIVE TYPE: Chronic | ICD-10-CM

## 2020-11-19 DIAGNOSIS — F32.A DEPRESSION, UNSPECIFIED DEPRESSION TYPE: ICD-10-CM

## 2020-11-19 PROCEDURE — 99214 OFFICE O/P EST MOD 30 MIN: CPT | Performed by: INTERNAL MEDICINE

## 2020-11-19 RX ORDER — DEXTROAMPHETAMINE SACCHARATE, AMPHETAMINE ASPARTATE, DEXTROAMPHETAMINE SULFATE AND AMPHETAMINE SULFATE 3.75; 3.75; 3.75; 3.75 MG/1; MG/1; MG/1; MG/1
15 TABLET ORAL 2 TIMES DAILY
Qty: 60 TABLET | Refills: 0 | Status: SHIPPED | OUTPATIENT
Start: 2021-01-22 | End: 2021-06-15

## 2020-11-19 RX ORDER — DEXTROAMPHETAMINE SACCHARATE, AMPHETAMINE ASPARTATE, DEXTROAMPHETAMINE SULFATE AND AMPHETAMINE SULFATE 3.75; 3.75; 3.75; 3.75 MG/1; MG/1; MG/1; MG/1
15 TABLET ORAL 2 TIMES DAILY
Qty: 60 TABLET | Refills: 0 | Status: SHIPPED | OUTPATIENT
Start: 2020-11-25 | End: 2021-03-15

## 2020-11-19 RX ORDER — DEXTROAMPHETAMINE SACCHARATE, AMPHETAMINE ASPARTATE, DEXTROAMPHETAMINE SULFATE AND AMPHETAMINE SULFATE 3.75; 3.75; 3.75; 3.75 MG/1; MG/1; MG/1; MG/1
15 TABLET ORAL 2 TIMES DAILY
Qty: 60 TABLET | Refills: 0 | Status: SHIPPED | OUTPATIENT
Start: 2020-12-24 | End: 2021-03-17

## 2020-11-19 RX ORDER — BUPROPION HYDROCHLORIDE 300 MG/1
300 TABLET ORAL EVERY MORNING
Qty: 90 TABLET | Refills: 1 | Status: SHIPPED | OUTPATIENT
Start: 2020-11-19 | End: 2021-06-15

## 2020-11-19 NOTE — PROGRESS NOTES
Patient's instructions / PLAN:                                                        Plan:  1. Increase Wellbutrin to 300 mg daily  2. Talk to your pharmacy if you need prior Authorization for the Adderall   3. Follow up in 6 months for the annual exam       ASSESSMENT & PLAN:                                                      (F32.9) Depression, unspecified depression type  Comment: Not controlled   Plan: buPROPion (WELLBUTRIN XL) 300 MG 24 hr tablet            (F90.0) ADHD, inattentive type  Comment:   Plan: amphetamine-dextroamphetamine (ADDERALL) 15 MG         tablet, amphetamine-dextroamphetamine         (ADDERALL) 15 MG tablet,         amphetamine-dextroamphetamine (ADDERALL) 15 MG         tablet               Chief Complaint:                                                      Depression   ADHD      SUBJECTIVE:                                                    History of present illness     Depression  -- she would like to try to increase the Wellbutrin  -- she feels that her depression is just little worse with the pandemic  --     ADHD  -- needs refills   -- doing well on this dose     ROS:                                                      ROS: negative for fever, chills, cough, wheezes, chest pain, shortness of breath, vomiting, abdominal pain, leg swelling         OBJECTIVE:                                                    Physical Exam :    Blood pressure 102/68, pulse 102, resp. rate 16, weight 66.2 kg (146 lb), SpO2 99 %, not currently breastfeeding.   NAD, appears comfortable  Neurologic: A, Ox3, no focal signs appreciated  Good eye contact. Speech with normal volume, pattern, tone. Thought process seems coherent, logical. Standard dressed appearance. Mood normal     PMHx: reviewed  Past Medical History:   Diagnosis Date     Abnormal Pap smear of cervix 03/19/2007    see problem list     Anxiety      Cervical high risk HPV (human papillomavirus) test positive 03/19/2007    see problem list      Controlled substance agreement signed- reviewed-no concerns 2/22/19 3/18/2020     Pap smear for cervical cancer screening 2008    biopsy taken and ok      PSHx: reviewed  Past Surgical History:   Procedure Laterality Date     NO HISTORY OF SURGERY          Meds: reviewed  Current Outpatient Medications   Medication Sig Dispense Refill     amphetamine-dextroamphetamine (ADDERALL) 15 MG tablet Take 1 tablet (15 mg) by mouth 2 times daily 60 tablet 0     amphetamine-dextroamphetamine (ADDERALL) 15 MG tablet Take 1 tablet (15 mg) by mouth 2 times daily 60 tablet 0     amphetamine-dextroamphetamine (ADDERALL) 15 MG tablet Take 1 tablet (15 mg) by mouth 2 times daily 60 tablet 0     amphetamine-dextroamphetamine (ADDERALL) 5 MG tablet Take 1 tablet (5 mg) by mouth daily as needed (poor attention) 30 tablet 0     buPROPion (WELLBUTRIN XL) 150 MG 24 hr tablet Take 1 tablet (150 mg) by mouth every morning 30 tablet 0     levonorgestrel (MIRENA) 20 MCG/24HR IUD 1 each by Intrauterine route once       multivitamin, therapeutic (THERA-VIT) TABS tablet Take 1 tablet by mouth daily       sulfamethoxazole-trimethoprim (BACTRIM DS) 800-160 MG tablet Take 1 tablet by mouth 2 times daily 6 tablet 0     vitamin D3 (CHOLECALCIFEROL) 2000 units (50 mcg) tablet Take 1 tablet by mouth daily         Soc Hx: reviewed  Fam Hx: reviewed          Mary Ann Maravilla MD  Internal Medicine

## 2020-11-19 NOTE — PATIENT INSTRUCTIONS
Plan:  1. Increase Wellbutrin to 300 mg daily  2. Talk to your pharmacy if you need prior Authorization for the Adderall   3. Follow up in 6 months for the annual exam

## 2020-11-20 ASSESSMENT — PATIENT HEALTH QUESTIONNAIRE - PHQ9: SUM OF ALL RESPONSES TO PHQ QUESTIONS 1-9: 11

## 2021-01-05 ENCOUNTER — VIRTUAL VISIT (OUTPATIENT)
Dept: FAMILY MEDICINE | Facility: CLINIC | Age: 31
End: 2021-01-05
Payer: COMMERCIAL

## 2021-01-05 DIAGNOSIS — K21.00 GASTROESOPHAGEAL REFLUX DISEASE WITH ESOPHAGITIS WITHOUT HEMORRHAGE: Primary | ICD-10-CM

## 2021-01-05 PROCEDURE — 99213 OFFICE O/P EST LOW 20 MIN: CPT | Mod: 95 | Performed by: PHYSICIAN ASSISTANT

## 2021-01-05 RX ORDER — OMEPRAZOLE 40 MG/1
40 CAPSULE, DELAYED RELEASE ORAL DAILY
Qty: 30 CAPSULE | Refills: 1 | Status: SHIPPED | OUTPATIENT
Start: 2021-01-05 | End: 2021-03-15

## 2021-01-05 NOTE — PATIENT INSTRUCTIONS
Hpylori testing to be done prior to omeprazole (prilosec) restart.    Acid Suppression Treatment    Start omeprazole 40mg tab (prescription sent to pharmacy) 30 minutes before breakfast until you symptoms resolve OR you have taken the medication for 2 months  Then decrease to 1 tab (20 mg, can get over the counter) in AM for 1 week, then 1 tablet every other day for another week  If you wish, you can also add pepcid at bedtime if you have having symptoms at night  If symptoms have not resolved, then please follow up with Gastroenterology referral placed today.    Lifestyle changes:    Avoid eating 2-3 hours before bedtime.   You may find it helpful to elevate the head of your bed.     Avoid following foods that are likely to trigger acid reflux:    Coffee or tea   Anything that s fizzy or has caffeine in it  Alcohol   Citrus fruits, such as oranges and yamil  Tomato based foods (salsa, pizza, lasanga)  Chocolate   Mint or peppermint  Fatty foods (ice cream)  Spicy foods  Onions and garlic       COVID vaccine FAQ:  https://bit.ly/42o2ky9      Did you know?      You can schedule a video visit for follow-up appointments as well as future appointments for certain conditions.  Please see the below link.     [email or web address]    If you have not already done so,  I encourage you to sign up for DeskGodt ([email or web address]).  This will allow you to review your results, securely communicate with a provider, and schedule virtual visits as well.  Patient Education     Tips to Control Acid Reflux    To control acid reflux, you ll need to make some basic diet and lifestyle changes. The simple steps outlined below may be all you ll need to ease discomfort.   Watch what you eat    Don't have fatty foods or spicy foods.    Eat fewer acidic foods, such as citrus and tomato-based foods. These can increase symptoms.    Limit drinking alcohol, caffeine, and fizzy beverages. All  increase acid reflux.    Try limiting chocolate, peppermint, and spearmint. These can make acid reflux worse in some people.    Watch when you eat    Don't lie down for 3 hours after eating.    Don't snack before going to bed.    Raise your head  Raising your head and upper body by 4 to 6 inches helps limit reflux when you re lying down. Put blocks under the head of your bed frame or a wedge under your mattress to raise it.   Other changes    Lose weight, if you need to    Don t exercise near bedtime    Don't wear tight-fitting clothes    Limit aspirin and ibuprofen    Stop smoking    [employer] last reviewed this educational content on 6/1/2019 2000-2020 [employer]. [address] [place] [address], [place] [address]. All rights reserved. This information is not intended as a substitute for professional medical care. Always follow your healthcare professional's instructions.

## 2021-01-05 NOTE — PROGRESS NOTES
Gerri Rosado is a 30 year old female who is being evaluated via a billable video visit.      How would you like to obtain your AVS? MyChart  If the video visit is dropped, the invitation should be resent by: Text to cell phone:    Will anyone else be joining your video visit? No      Video Start Time: 3:15 PM  Assessment & Plan     Gastroesophageal reflux disease with esophagitis without hemorrhage  See patient instruction; h pylor testing to be done then higher dosage round of omeprazole for the next 1-2 months with taper sent to pharmacy; follow up with Gastroenterology if no improvement with above.  - Helicobacter pylori Antigen Stool; Future  - omeprazole (PRILOSEC) 40 MG DR capsule; Take 1 capsule (40 mg) by mouth daily  - GASTROENTEROLOGY ADULT REF CONSULT ONLY; Future    Review of prior external note(s) from - previous visits    20 minutes spent on the date of the encounter doing chart review, history and exam, documentation and further activities as noted above           Patient Instructions   Hpylori testing to be done prior to omeprazole (prilosec) restart.    Acid Suppression Treatment    Start omeprazole 40mg tab (prescription sent to pharmacy) 30 minutes before breakfast until you symptoms resolve OR you have taken the medication for 2 months  Then decrease to 1 tab (20 mg, can get over the counter) in AM for 1 week, then 1 tablet every other day for another week  If you wish, you can also add pepcid at bedtime if you have having symptoms at night  If symptoms have not resolved, then please follow up with Gastroenterology referral placed today.    Lifestyle changes:    Avoid eating 2-3 hours before bedtime.   You may find it helpful to elevate the head of your bed.     Avoid following foods that are likely to trigger acid reflux:    Coffee or tea   Anything that s fizzy or has caffeine in it  Alcohol   Citrus fruits, such as oranges and yamil  Tomato based foods (salsa, pizza, lasanga)  Chocolate    Mint or peppermint  Fatty foods (ice cream)  Spicy foods  Onions and garlic       COVID vaccine FAQ:  https://bit.ly/22w2ze9      Did you know?      You can schedule a video visit for follow-up appointments as well as future appointments for certain conditions.  Please see the below link.     [email or web address]    If you have not already done so,  I encourage you to sign up for [employer] ([email or web address]).  This will allow you to review your results, securely communicate with a provider, and schedule virtual visits as well.  Patient Education     Tips to Control Acid Reflux    To control acid reflux, you ll need to make some basic diet and lifestyle changes. The simple steps outlined below may be all you ll need to ease discomfort.   Watch what you eat    Don't have fatty foods or spicy foods.    Eat fewer acidic foods, such as citrus and tomato-based foods. These can increase symptoms.    Limit drinking alcohol, caffeine, and fizzy beverages. All increase acid reflux.    Try limiting chocolate, peppermint, and spearmint. These can make acid reflux worse in some people.    Watch when you eat    Don't lie down for 3 hours after eating.    Don't snack before going to bed.    Raise your head  Raising your head and upper body by 4 to 6 inches helps limit reflux when you re lying down. Put blocks under the head of your bed frame or a wedge under your mattress to raise it.   Other changes    Lose weight, if you need to    Don t exercise near bedtime    Don't wear tight-fitting clothes    Limit aspirin and ibuprofen    Stop smoking    [employer] last reviewed this educational content on 6/1/2019 2000-2020 [employer]. [address] [place] [address], [place] [address]. All rights reserved. This information is not intended as a substitute for professional medical care. Always follow your healthcare professional's instructions.               Return in about 6 months  (around 7/5/2021) for Routine Visit, or sooner with worsening symptoms.    MAKAYLA Fernandez [facility]     Gerri Rosado is a 30 year old who presents to clinic today for the following health issues     HPI       GERD/Heartburn  Onset/Duration: for a long time and getting worse  Description: acid reflux off/on with burping/gas  Intensity: moderate but flares  Progression of Symptoms: waxing and waning  Accompanying Signs & Symptoms:  Does it feel like food gets stuck or trouble swallowing: no  Nausea: YES occasionally but not really  Vomiting (bloody?): no  Abdominal Pain: no  Black-Tarry stools: no  Bloody stools: no  History:  Previous similar episodes: YES  Previous ulcers: no  Precipitating factors:   Caffeine use: no  Alcohol use: no  NSAID/Aspirin use: no  Tobacco use: no  Worse with no particular food or drink.  Alleviating factors: eating at any time; alcohol previously but has stopped  Therapies tried and outcome:             Lifestyle changes: changed diet            Medications: Omeprazole (Prilosec) and tums (slight improvement)    Tried over the counter Prilosec for two weeks in October with temporary relief, but came back.  She has tried to change diet with no real changes.      Review of Systems   Constitutional, HEENT, cardiovascular, pulmonary, GI, , musculoskeletal, neuro, skin, endocrine and psych systems are negative, except as otherwise noted.      Objective           Vitals:  No vitals were obtained today due to virtual visit.    Physical Exam   GENERAL: Healthy, alert and no distress  EYES: Eyes grossly normal to inspection.  No discharge or erythema, or obvious scleral/conjunctival abnormalities.  RESP: No audible wheeze, cough, or visible cyanosis.  No visible retractions or increased work of breathing.    SKIN: Visible skin clear. No significant rash, abnormal pigmentation or lesions.  NEURO: Cranial nerves grossly intact.  Mentation and  speech appropriate for age.  PSYCH: Mentation appears normal, affect normal/bright, judgement and insight intact, normal speech and appearance well-groomed.    Orders Only on 08/27/2020   Component Date Value Ref Range Status     HIV Antigen Antibody Combo 08/27/2020 Nonreactive  NR^Nonreactive     Final    HIV-1 p24 Ag & HIV-1/HIV-2 Ab Not Detected     Vitamin D Deficiency screening 08/27/2020 37  20 - 75 ug/L Final    Comment: Season, race, dietary intake, and treatment affect the concentration of   25-hydroxy-Vitamin D. Values may decrease during winter months and increase   during summer months. Values 20-29 ug/L may indicate Vitamin D insufficiency   and values <20 ug/L may indicate Vitamin D deficiency.  Vitamin D determination is routinely performed by an immunoassay specific for   25 hydroxyvitamin D3.  If an individual is on vitamin D2 (ergocalciferol)   supplementation, please specify 25 OH vitamin D2 and D3 level determination by   LCMSMS test VITD23.       WBC 08/27/2020 4.7  4.0 - 11.0 10e9/L Final     RBC Count 08/27/2020 4.86  3.8 - 5.2 10e12/L Final     Hemoglobin 08/27/2020 15.3  11.7 - 15.7 g/dL Final     Hematocrit 08/27/2020 44.0  35.0 - 47.0 % Final     MCV 08/27/2020 91  78 - 100 fl Final     MCH 08/27/2020 31.5  26.5 - 33.0 pg Final     MCHC 08/27/2020 34.8  31.5 - 36.5 g/dL Final     RDW 08/27/2020 12.7  10.0 - 15.0 % Final     Platelet Count 08/27/2020 292  150 - 450 10e9/L Final     % Neutrophils 08/27/2020 58.9  % Final     % Lymphocytes 08/27/2020 29.9  % Final     % Monocytes 08/27/2020 8.0  % Final     % Eosinophils 08/27/2020 2.6  % Final     % Basophils 08/27/2020 0.6  % Final     Absolute Neutrophil 08/27/2020 2.7  1.6 - 8.3 10e9/L Final     Absolute Lymphocytes 08/27/2020 1.4  0.8 - 5.3 10e9/L Final     Absolute Monocytes 08/27/2020 0.4  0.0 - 1.3 10e9/L Final     Absolute Eosinophils 08/27/2020 0.1  0.0 - 0.7 10e9/L Final     Absolute Basophils 08/27/2020 0.0  0.0 - 0.2 10e9/L  Final     Diff Method 08/27/2020 Automated Method   Final     TSH 08/27/2020 1.72  0.40 - 4.00 mU/L Final     Sodium 08/27/2020 141  133 - 144 mmol/L Final     Potassium 08/27/2020 4.1  3.4 - 5.3 mmol/L Final     Chloride 08/27/2020 110* 94 - 109 mmol/L Final     Carbon Dioxide 08/27/2020 22  20 - 32 mmol/L Final     Anion Gap 08/27/2020 9  3 - 14 mmol/L Final     Glucose 08/27/2020 78  70 - 99 mg/dL Final    Fasting specimen     Urea Nitrogen 08/27/2020 12  7 - 30 mg/dL Final     Creatinine 08/27/2020 0.81  0.52 - 1.04 mg/dL Final     GFR Estimate 08/27/2020 >90  >60 mL/min/[1.73_m2] Final    Comment: Non  GFR Calc  Starting 12/18/2018, serum creatinine based estimated GFR (eGFR) will be   calculated using the Chronic Kidney Disease Epidemiology Collaboration   (CKD-EPI) equation.       GFR Estimate If Black 08/27/2020 >90  >60 mL/min/[1.73_m2] Final    Comment:  GFR Calc  Starting 12/18/2018, serum creatinine based estimated GFR (eGFR) will be   calculated using the Chronic Kidney Disease Epidemiology Collaboration   (CKD-EPI) equation.       Calcium 08/27/2020 8.6  8.5 - 10.1 mg/dL Final     Bilirubin Total 08/27/2020 0.6  0.2 - 1.3 mg/dL Final     Albumin 08/27/2020 4.1  3.4 - 5.0 g/dL Final     Protein Total 08/27/2020 7.5  6.8 - 8.8 g/dL Final     Alkaline Phosphatase 08/27/2020 63  40 - 150 U/L Final     ALT 08/27/2020 32  0 - 50 U/L Final     AST 08/27/2020 21  0 - 45 U/L Final     Cholesterol 08/27/2020 164  <200 mg/dL Final     Triglycerides 08/27/2020 99  <150 mg/dL Final    Fasting specimen     HDL Cholesterol 08/27/2020 65  >49 mg/dL Final     LDL Cholesterol Calculated 08/27/2020 79  <100 mg/dL Final    Desirable:       <100 mg/dl     Non HDL Cholesterol 08/27/2020 99  <130 mg/dL Final             Video-Visit Details    Type of service:  Video Visit    Video End Time:3:29 PM    Originating Location (pt. Location): Home    Distant Location (provider location):  [facility]  [facility]     Platform used for Video Visit: Teofilo

## 2021-01-05 NOTE — NURSING NOTE
"Chief Complaint   Patient presents with     Abdominal Pain     initial There were no vitals taken for this visit. Estimated body mass index is 23.04 kg/m  as calculated from the following:    Height as of 3/10/20: 1.695 m (5' 6.75\").    Weight as of 11/19/20: 66.2 kg (146 lb).  BP completed using cuff size: .  L  R arm      Health Maintenance that is potentially due pending provider review:      Kvng Esparza ma  "

## 2021-01-05 NOTE — PROGRESS NOTES
"Gerri Rosado is a 30 year old female who is being evaluated via a billable video visit.      How would you like to obtain your AVS? MyChart  If the video visit is dropped, the invitation should be resent by:   Will anyone else be joining your video visit? No  {If patient encounters technical issues they should call [phone] :774043}    Video Start Time: {video visit start/end time for provider to select:337637}  {PROVIDER CHARTING PREFERENCE:366847}    Subjective     Gerri Rosado is a 30 year old who presents to clinic today for the following health issues {ACCOMPANIED BY STATEMENT (Optional):308347}    HPI       Heartburn  {additonal problems for provider to add (Optional):652211}    Review of Systems   {ROS COMP (Optional):105110}      Objective           Vitals:  No vitals were obtained today due to virtual visit.    Physical Exam   {video visit exam brief selected:053412::\"GENERAL: Healthy, alert and no distress\",\"EYES: Eyes grossly normal to inspection.  No discharge or erythema, or obvious scleral/conjunctival abnormalities.\",\"RESP: No audible wheeze, cough, or visible cyanosis.  No visible retractions or increased work of breathing.  \",\"SKIN: Visible skin clear. No significant rash, abnormal pigmentation or lesions.\",\"NEURO: Cranial nerves grossly intact.  Mentation and speech appropriate for age.\",\"PSYCH: Mentation appears normal, affect normal/bright, judgement and insight intact, normal speech and appearance well-groomed.\"}    {Diagnostic Test Results (Optional):489013}    {AMBULATORY ATTESTATION (Optional):392576}        Video-Visit Details    Type of service:  Video Visit    Video End Time:{video visit start/end time for provider to select:820776}    Originating Location (pt. Location): {video visit patient location:995043::\"Home\"}    Distant Location (provider location):  [facility]     Platform used for Video Visit: {Virtual Visit Platforms:428702::\"[employer]o\"}  "

## 2021-01-12 DIAGNOSIS — K21.00 GASTROESOPHAGEAL REFLUX DISEASE WITH ESOPHAGITIS WITHOUT HEMORRHAGE: ICD-10-CM

## 2021-01-12 PROCEDURE — 87338 HPYLORI STOOL AG IA: CPT | Performed by: PHYSICIAN ASSISTANT

## 2021-01-13 LAB — H PYLORI AG STL QL IA: NEGATIVE

## 2021-01-15 ENCOUNTER — HEALTH MAINTENANCE LETTER (OUTPATIENT)
Age: 31
End: 2021-01-15

## 2021-01-15 NOTE — RESULT ENCOUNTER NOTE
"[facility]  Your attached labs are negative for Hpylori.  Please contact the office with any questions or concerns.    Fannie John \"Deejay\" MAKAYLA Griffin    "

## 2021-03-15 ENCOUNTER — MYC REFILL (OUTPATIENT)
Dept: INTERNAL MEDICINE | Facility: CLINIC | Age: 31
End: 2021-03-15

## 2021-03-15 DIAGNOSIS — F90.0 ATTENTION DEFICIT HYPERACTIVITY DISORDER (ADHD), PREDOMINANTLY INATTENTIVE TYPE: Chronic | ICD-10-CM

## 2021-03-15 DIAGNOSIS — K21.00 GASTROESOPHAGEAL REFLUX DISEASE WITH ESOPHAGITIS WITHOUT HEMORRHAGE: ICD-10-CM

## 2021-03-15 RX ORDER — OMEPRAZOLE 40 MG/1
40 CAPSULE, DELAYED RELEASE ORAL DAILY
Qty: 90 CAPSULE | Refills: 0 | Status: SHIPPED | OUTPATIENT
Start: 2021-03-15 | End: 2021-06-15

## 2021-03-16 ENCOUNTER — MYC REFILL (OUTPATIENT)
Dept: INTERNAL MEDICINE | Facility: CLINIC | Age: 31
End: 2021-03-16

## 2021-03-16 DIAGNOSIS — F90.0 ATTENTION DEFICIT HYPERACTIVITY DISORDER (ADHD), PREDOMINANTLY INATTENTIVE TYPE: Chronic | ICD-10-CM

## 2021-03-16 RX ORDER — DEXTROAMPHETAMINE SACCHARATE, AMPHETAMINE ASPARTATE, DEXTROAMPHETAMINE SULFATE AND AMPHETAMINE SULFATE 3.75; 3.75; 3.75; 3.75 MG/1; MG/1; MG/1; MG/1
15 TABLET ORAL 2 TIMES DAILY
Qty: 60 TABLET | Refills: 0 | Status: CANCELLED | OUTPATIENT
Start: 2021-03-16

## 2021-03-17 RX ORDER — DEXTROAMPHETAMINE SACCHARATE, AMPHETAMINE ASPARTATE, DEXTROAMPHETAMINE SULFATE AND AMPHETAMINE SULFATE 3.75; 3.75; 3.75; 3.75 MG/1; MG/1; MG/1; MG/1
15 TABLET ORAL 2 TIMES DAILY
Qty: 60 TABLET | Refills: 0 | Status: SHIPPED | OUTPATIENT
Start: 2021-04-16 | End: 2021-05-21

## 2021-03-17 RX ORDER — DEXTROAMPHETAMINE SACCHARATE, AMPHETAMINE ASPARTATE, DEXTROAMPHETAMINE SULFATE AND AMPHETAMINE SULFATE 3.75; 3.75; 3.75; 3.75 MG/1; MG/1; MG/1; MG/1
15 TABLET ORAL 2 TIMES DAILY
Qty: 60 TABLET | Refills: 0 | Status: SHIPPED | OUTPATIENT
Start: 2021-03-17 | End: 2021-06-15

## 2021-03-17 NOTE — TELEPHONE ENCOUNTER
Controlled Substance Refill Request for Adderall  Problem List Complete:    No     PROVIDER TO CONSIDER COMPLETION OF PROBLEM LIST AND OVERVIEW/CONTROLLED SUBSTANCE AGREEMENT    Last Written Prescription Date:  1/22/21  Last Fill Quantity: 60,   # refills: 0    Last Office Visit with [facility] primary care provider: 11/19/20    Future Office visit:     Controlled substance agreement:   Encounter-Level CSA - 04/15/2016:    Controlled Substance Agreement - Scan on 4/21/2016 10:03 AM: [place] Controlled Substance Agreement, 4/15/16     Patient-Level CSA:    Controlled Substance Agreement - Non - Opioid - Scan on 3/16/2020 11:26 AM: NON-OPIOID CONTROLLED SUBSTANCE AGREEMENT         Last Urine Drug Screen: No results found for: CDAUT, No results found for: COMDAT,   Cannabinoids (66-xmw-2-carboxy-9-THC)   Date Value Ref Range Status   03/18/2020 Not Detected NDET^Not Detected ng/mL Final     Comment:     Cutoff for a negative cannabinoid is 50 ng/mL or less.     Phencyclidine (Phencyclidine)   Date Value Ref Range Status   03/18/2020 Not Detected NDET^Not Detected ng/mL Final     Comment:     Cutoff for a negative PCP is 25 ng/mL or less.     Cocaine (Benzoylecgonine)   Date Value Ref Range Status   03/18/2020 Not Detected NDET^Not Detected ng/mL Final     Comment:     Cutoff for a negative cocaine is 150 ng/ml or less.     Methamphetamine (d-Methamphetamine)   Date Value Ref Range Status   03/18/2020 Not Detected NDET^Not Detected ng/mL Final     Comment:     Cutoff for a negative methamphetamine is 500 ng/ml or less.     Opiates (Morphine)   Date Value Ref Range Status   03/18/2020 Not Detected NDET^Not Detected ng/mL Final     Comment:     Cutoff for a negative opiate is 100 ng/ml or less.     Amphetamine (d-Amphetamine)   Date Value Ref Range Status   03/18/2020 Detected, Abnormal Result (A) NDET^Not Detected ng/mL Final     Comment:     Cutoff for a positive amphetamine is greater than 500 ng/ml.  This is an unconfirmed  screening result to be used for medical purposes only.   Order [id number] for confirmation or individual confirmation tests to [facility].       Benzodiazepines (Nordiazepam)   Date Value Ref Range Status   03/18/2020 Not Detected NDET^Not Detected ng/mL Final     Comment:     Cutoff for a negative benzodiazepine is 150 ng/ml or less.     Tricyclic Antidepressants (Desipramine)   Date Value Ref Range Status   03/18/2020 Not Detected NDET^Not Detected ng/mL Final     Comment:     Cutoff for a negative tricyclic antidepressant is 300 ng/ml or less.     Methadone (Methadone)   Date Value Ref Range Status   03/18/2020 Not Detected NDET^Not Detected ng/mL Final     Comment:     Cutoff for a negative methadone is 200 ng/ml or less.     Barbiturates (Butalbital)   Date Value Ref Range Status   03/18/2020 Not Detected NDET^Not Detected ng/mL Final     Comment:     Cutoff for a negative barbituate is 200 ng/ml or less.     Oxycodone (Oxycodone)   Date Value Ref Range Status   03/18/2020 Not Detected NDET^Not Detected ng/mL Final     Comment:     Cutoff for a negative Oxycodone is 100 ng/mL or less.     Propoxyphene (Norpropoxyphene)   Date Value Ref Range Status   03/18/2020 Not Detected NDET^Not Detected ng/mL Final     Comment:     Cutoff for a negative propoxyphene is 300 ng/ml or less     Buprenorphine (Buprenorphine)   Date Value Ref Range Status   03/18/2020 Not Detected NDET^Not Detected ng/mL Final     Comment:     Cutoff for a negative buprenorphine is 10 ng/ml or less        [place] reviewed:  reviewed- no concerns  [place]  [email or web address]

## 2021-05-09 ENCOUNTER — HEALTH MAINTENANCE LETTER (OUTPATIENT)
Age: 31
End: 2021-05-09

## 2021-05-21 ENCOUNTER — MYC REFILL (OUTPATIENT)
Dept: INTERNAL MEDICINE | Facility: CLINIC | Age: 31
End: 2021-05-21

## 2021-05-21 DIAGNOSIS — F90.0 ATTENTION DEFICIT HYPERACTIVITY DISORDER (ADHD), PREDOMINANTLY INATTENTIVE TYPE: Chronic | ICD-10-CM

## 2021-05-21 RX ORDER — DEXTROAMPHETAMINE SACCHARATE, AMPHETAMINE ASPARTATE, DEXTROAMPHETAMINE SULFATE AND AMPHETAMINE SULFATE 3.75; 3.75; 3.75; 3.75 MG/1; MG/1; MG/1; MG/1
15 TABLET ORAL 2 TIMES DAILY
Qty: 60 TABLET | Refills: 0 | Status: CANCELLED | OUTPATIENT
Start: 2021-05-21

## 2021-05-21 NOTE — TELEPHONE ENCOUNTER
Pending Prescriptions:                       Disp   Refills    amphetamine-dextroamphetamine (ADDERALL) 1*60 tab*0        Sig: Take 1 tablet (15 mg) by mouth 2 times daily    amphetamine-dextroamphetamine (ADDERALL) 1*60 tab*0        Sig: Take 1 tablet (15 mg) by mouth 2 times daily    Routing refill request to provider for review/approval because:  Drug not on the [facility] refill protocol

## 2021-05-23 NOTE — TELEPHONE ENCOUNTER
I will consider the refills when I see that the patient will appointment for annual exam      LOV in Nov 2020:Plan:  1. Increase Wellbutrin to 300 mg daily  2. Talk to your pharmacy if you need prior Authorization for the Adderall   3. Follow up in 6 months for the annual exam

## 2021-05-26 ENCOUNTER — MYC REFILL (OUTPATIENT)
Dept: INTERNAL MEDICINE | Facility: CLINIC | Age: 31
End: 2021-05-26

## 2021-05-26 DIAGNOSIS — F90.0 ATTENTION DEFICIT HYPERACTIVITY DISORDER (ADHD), PREDOMINANTLY INATTENTIVE TYPE: Chronic | ICD-10-CM

## 2021-05-27 RX ORDER — DEXTROAMPHETAMINE SACCHARATE, AMPHETAMINE ASPARTATE, DEXTROAMPHETAMINE SULFATE AND AMPHETAMINE SULFATE 3.75; 3.75; 3.75; 3.75 MG/1; MG/1; MG/1; MG/1
15 TABLET ORAL 2 TIMES DAILY
Qty: 60 TABLET | Refills: 0 | OUTPATIENT
Start: 2021-05-27

## 2021-05-27 NOTE — TELEPHONE ENCOUNTER
Patient is checking on the status of this.    Next 5 appointments (look out 90 days)    Nabeel 15, 2021  9:00 AM  PHYSICAL with Mary Ann Aguilera MD  [facility] ([facility] ) [address]  [facility] [address]  [phone]

## 2021-05-28 RX ORDER — DEXTROAMPHETAMINE SACCHARATE, AMPHETAMINE ASPARTATE, DEXTROAMPHETAMINE SULFATE AND AMPHETAMINE SULFATE 3.75; 3.75; 3.75; 3.75 MG/1; MG/1; MG/1; MG/1
15 TABLET ORAL 2 TIMES DAILY
Qty: 60 TABLET | Refills: 0 | Status: SHIPPED | OUTPATIENT
Start: 2021-05-28 | End: 2021-06-15

## 2021-06-15 ENCOUNTER — OFFICE VISIT (OUTPATIENT)
Dept: INTERNAL MEDICINE | Facility: CLINIC | Age: 31
End: 2021-06-15
Payer: COMMERCIAL

## 2021-06-15 VITALS
DIASTOLIC BLOOD PRESSURE: 74 MMHG | SYSTOLIC BLOOD PRESSURE: 108 MMHG | OXYGEN SATURATION: 100 % | TEMPERATURE: 98.5 F | HEART RATE: 103 BPM | RESPIRATION RATE: 18 BRPM | WEIGHT: 142.6 LBS | BODY MASS INDEX: 22.38 KG/M2 | HEIGHT: 67 IN

## 2021-06-15 DIAGNOSIS — F32.A DEPRESSION, UNSPECIFIED DEPRESSION TYPE: ICD-10-CM

## 2021-06-15 DIAGNOSIS — K21.00 GASTROESOPHAGEAL REFLUX DISEASE WITH ESOPHAGITIS WITHOUT HEMORRHAGE: ICD-10-CM

## 2021-06-15 DIAGNOSIS — Z00.00 ROUTINE GENERAL MEDICAL EXAMINATION AT A HEALTH CARE FACILITY: Primary | ICD-10-CM

## 2021-06-15 DIAGNOSIS — F90.0 ATTENTION DEFICIT HYPERACTIVITY DISORDER (ADHD), PREDOMINANTLY INATTENTIVE TYPE: Chronic | ICD-10-CM

## 2021-06-15 LAB
AMPHETAMINES UR QL: NOT DETECTED NG/ML
BARBITURATES UR QL SCN: NOT DETECTED NG/ML
BENZODIAZ UR QL SCN: NOT DETECTED NG/ML
BUPRENORPHINE UR QL: NOT DETECTED NG/ML
CANNABINOIDS UR QL: NOT DETECTED NG/ML
COCAINE UR QL SCN: NOT DETECTED NG/ML
D-METHAMPHET UR QL: NOT DETECTED NG/ML
ERYTHROCYTE [DISTWIDTH] IN BLOOD BY AUTOMATED COUNT: 12.5 % (ref 10–15)
HCT VFR BLD AUTO: 41.8 % (ref 35–47)
HGB BLD-MCNC: 14.2 G/DL (ref 11.7–15.7)
MCH RBC QN AUTO: 30.6 PG (ref 26.5–33)
MCHC RBC AUTO-ENTMCNC: 34 G/DL (ref 31.5–36.5)
MCV RBC AUTO: 90 FL (ref 78–100)
METHADONE UR QL SCN: NOT DETECTED NG/ML
OPIATES UR QL SCN: NOT DETECTED NG/ML
OXYCODONE UR QL SCN: NOT DETECTED NG/ML
PCP UR QL SCN: NOT DETECTED NG/ML
PLATELET # BLD AUTO: 290 10E9/L (ref 150–450)
PROPOXYPH UR QL: NOT DETECTED NG/ML
RBC # BLD AUTO: 4.64 10E12/L (ref 3.8–5.2)
TRICYCLICS UR QL SCN: NOT DETECTED NG/ML
WBC # BLD AUTO: 5.5 10E9/L (ref 4–11)

## 2021-06-15 PROCEDURE — 80061 LIPID PANEL: CPT | Performed by: INTERNAL MEDICINE

## 2021-06-15 PROCEDURE — 84443 ASSAY THYROID STIM HORMONE: CPT | Performed by: INTERNAL MEDICINE

## 2021-06-15 PROCEDURE — 80306 DRUG TEST PRSMV INSTRMNT: CPT | Performed by: INTERNAL MEDICINE

## 2021-06-15 PROCEDURE — 99395 PREV VISIT EST AGE 18-39: CPT | Performed by: INTERNAL MEDICINE

## 2021-06-15 PROCEDURE — 80053 COMPREHEN METABOLIC PANEL: CPT | Performed by: INTERNAL MEDICINE

## 2021-06-15 PROCEDURE — 85027 COMPLETE CBC AUTOMATED: CPT | Performed by: INTERNAL MEDICINE

## 2021-06-15 PROCEDURE — 36415 COLL VENOUS BLD VENIPUNCTURE: CPT | Performed by: INTERNAL MEDICINE

## 2021-06-15 PROCEDURE — 99213 OFFICE O/P EST LOW 20 MIN: CPT | Mod: 25 | Performed by: INTERNAL MEDICINE

## 2021-06-15 RX ORDER — DEXTROAMPHETAMINE SACCHARATE, AMPHETAMINE ASPARTATE, DEXTROAMPHETAMINE SULFATE AND AMPHETAMINE SULFATE 3.75; 3.75; 3.75; 3.75 MG/1; MG/1; MG/1; MG/1
15 TABLET ORAL 2 TIMES DAILY
Qty: 60 TABLET | Refills: 0 | Status: SHIPPED | OUTPATIENT
Start: 2021-07-24 | End: 2021-10-28

## 2021-06-15 RX ORDER — DEXTROAMPHETAMINE SACCHARATE, AMPHETAMINE ASPARTATE, DEXTROAMPHETAMINE SULFATE AND AMPHETAMINE SULFATE 3.75; 3.75; 3.75; 3.75 MG/1; MG/1; MG/1; MG/1
15 TABLET ORAL 2 TIMES DAILY
Qty: 60 TABLET | Refills: 0 | Status: SHIPPED | OUTPATIENT
Start: 2021-06-25 | End: 2021-10-28

## 2021-06-15 RX ORDER — OMEPRAZOLE 40 MG/1
40 CAPSULE, DELAYED RELEASE ORAL DAILY
Qty: 90 CAPSULE | Refills: 0 | Status: SHIPPED | OUTPATIENT
Start: 2021-06-15 | End: 2021-09-13

## 2021-06-15 RX ORDER — DEXTROAMPHETAMINE SACCHARATE, AMPHETAMINE ASPARTATE, DEXTROAMPHETAMINE SULFATE AND AMPHETAMINE SULFATE 3.75; 3.75; 3.75; 3.75 MG/1; MG/1; MG/1; MG/1
15 TABLET ORAL 2 TIMES DAILY
Qty: 60 TABLET | Refills: 0 | Status: SHIPPED | OUTPATIENT
Start: 2021-08-24 | End: 2021-10-28

## 2021-06-15 RX ORDER — BUPROPION HYDROCHLORIDE 300 MG/1
300 TABLET ORAL EVERY MORNING
Qty: 90 TABLET | Refills: 1 | Status: SHIPPED | OUTPATIENT
Start: 2021-06-15 | End: 2021-09-17

## 2021-06-15 ASSESSMENT — ENCOUNTER SYMPTOMS
MYALGIAS: 0
ABDOMINAL PAIN: 0
PARESTHESIAS: 0
COUGH: 0
WEAKNESS: 0
FREQUENCY: 0
NAUSEA: 0
BREAST MASS: 0
DYSURIA: 0
SORE THROAT: 0
CONSTIPATION: 0
PALPITATIONS: 0
JOINT SWELLING: 0
NERVOUS/ANXIOUS: 0
HEMATOCHEZIA: 0
CHILLS: 0
DIARRHEA: 0
HEMATURIA: 0
HEARTBURN: 0
FEVER: 0
ARTHRALGIAS: 0
DIZZINESS: 0
SHORTNESS OF BREATH: 0
HEADACHES: 0
EYE PAIN: 0

## 2021-06-15 ASSESSMENT — MIFFLIN-ST. JEOR: SCORE: 1385.49

## 2021-06-15 ASSESSMENT — PATIENT HEALTH QUESTIONNAIRE - PHQ9: SUM OF ALL RESPONSES TO PHQ QUESTIONS 1-9: 4

## 2021-06-15 NOTE — LETTER
[facility]  06/15/21  Patient: Gerri Rosado  YOB: 1990  Medical Record Number: [id number]                                                                                  Non-Opioid Controlled Substance Agreement    This is an agreement between you and your provider regarding safe and appropriate use of controlled substances prescribed by your care team. Controlled substances are?medicines that can cause physical and mental dependence (abuse).     There are strict laws about having and using these medicines. We here at [facility] are  committed to working with you in your efforts to get better. To support you in this work, we'll help you schedule regular office appointments for medicine refills. If we must cancel or change your appointment for any reason, we'll make sure you have enough medicine to last until your next appointment.     As a Provider, I will:     Listen carefully to your concerns while treating you with respect.     Recommend a treatment plan that I believe is in your best interest and may involve therapies other than medicine.      Talk with you often about the possible benefits and the risk of harm of any medicine that we prescribe for you.    Assess the safety of this medicine and check how well it works.      Provide a plan on how to taper (discontinue or go off) using this medicine if the decision is made to stop its use.      ::  As a Patient, I understand controlled substances:       Are prescribed by my care provider to help me function or work and manage my condition(s).?    Are strong medicines and can cause serious side effects.       Need to be taken exactly as prescribed.?Combining controlled substances with certain medicines or chemicals (such as illegal drugs, alcohol, sedatives, sleeping pills, and benzodiazepines) can be dangerous or even fatal.? If I stop taking my medicines suddenly, I may have severe withdrawal symptoms.     The  risks, benefits, and side effects of these medicine(s) were explained to me. I agree that:    1. I will take part in other treatments as advised by my care team. This may be psychiatry or counseling, physical therapy, behavioral therapy, group treatment or a referral to specialist.    2. I will keep all my appointments and understand this is part of the monitoring of controlled substances.?My care team may require an office visit for EVERY controlled substance refill. If I miss appointments or don t follow instructions, my care team may stop my medicine    3. I will take my medicines as prescribed. I will not change the dose or schedule unless my care team tells me to. There will be no refills if I run out early.      4. I may be asked to come to the clinic and complete a urine drug test or complete a pill count. If I don t give a urine sample or participate in a pill count, the care team may stop my medicine.    5. I will only receive controlled substance prescriptions from this clinic. If I am treated by another provider, I will tell them that I am taking controlled substances and that I have a treatment agreement with this provider. I will inform my [facility] care team within one business day if I am given a prescription for any controlled substance by another healthcare provider. My [facility] care team can contact other providers and pharmacists about my use of any medicines.    6. It is up to me to make sure that I don't run out of my medicines on weekends or holidays.?If my care team is willing to refill my prescription without a visit, I must request refills only during office hours. Refills may take up to 3 business days to process. I will use one pharmacy to fill all my controlled substance prescriptions. I will notify the clinic about any changes to my insurance or medicine availability.    7. I am responsible for my prescriptions. If the medicine/prescription is lost, stolen or destroyed,  it will not be replaced.?I also agree not to share controlled substance medicines with anyone.     8. I am aware I should not use any illegal or recreational drugs. I agree not to drink alcohol unless my care team says I can.     9. If I enroll in the [place] Medical Cannabis program, I will tell my care team before my next refill.    10. I will tell my care team right away if I become pregnant, have a new medical problem treated outside of my regular clinic, or have a change in my medicines.     11. I understand that this medicine can affect my thinking, judgment and reaction time.? Alcohol and drugs affect the brain and body, which can affect the safety of my driving. Being under the influence of alcohol or drugs can affect my decision-making, behaviors, personal safety and the safety of others. Driving while impaired (DWI) can occur if a person is driving, operating or in physical control of a car, motorcycle, boat, snowmobile, ATV, motorbike, off-road vehicle or any other motor vehicle ([place] Statute 169A.20). I understand the risk if I choose to drive or operate any vehicle or machinery.    I understand that if I do not follow any of the conditions above, my prescriptions or treatment may be stopped or changed.   I agree that my provider, clinic care team and pharmacy may work with any city, state or federal law enforcement agency that investigates the misuse, sale or other diversion of my controlled medicine. I will allow my provider to discuss my care with, or share a copy of, this agreement with any other treating provider, pharmacy or emergency room where I receive care.     I have read this agreement and have asked questions about anything I did not understand.    ________________________________________________________  Patient Signature - Gerri Rosado     ___________________                   Date     ________________________________________________________  Provider Signature - Mary Ann Powell  Crintea-Stoian, MD       ___________________                   Date     ________________________________________________________  Witness Signature (required if provider not present while patient signing)          ___________________                   Date

## 2021-06-15 NOTE — PATIENT INSTRUCTIONS
Plan:  1. Continue same meds, same doses for now   2.  Labs today - [address]   3. Follow up January, 2022 for ADHD

## 2021-06-15 NOTE — PROGRESS NOTES
Dr Maravilla's note    Patient's instructions / PLAN:                                                        Plan:  1. Continue same meds, same doses for now   2.  Labs today - [address]   3. Follow up January, 2022 for ADHD        ASSESSMENT & PLAN:                                                      (Z00.00) Routine general medical examination at a health care facility  (primary encounter diagnosis)  Comment:   Plan: Comprehensive metabolic panel, CBC with         platelets, Lipid panel reflex to direct LDL         Fasting, TSH with free T4 reflex            (F90.0) ADHD, inattentive type  Comment: Controlled    Plan: amphetamine-dextroamphetamine (ADDERALL) 15 MG         tablet, amphetamine-dextroamphetamine         (ADDERALL) 15 MG tablet,         amphetamine-dextroamphetamine (ADDERALL) 15 MG         tablet, Urine Drugs of Abuse Screen Panel 13            (F32.9) Depression, unspecified depression type  Comment: Controlled    Plan: buPROPion (WELLBUTRIN XL) 300 MG 24 hr tablet               Chief Complaint:                                                        Annual exam  Follow up chronic medical problems      SUBJECTIVE:                                                    History of present illness     We reviewed the chronic medical problems as above.   I reviewed the recent tests results in Epic       ROS:     See below      PMHx: - reviewed  Past Medical History:   Diagnosis Date     Abnormal Pap smear of cervix 03/19/2007    see problem list     Anxiety      Cervical high risk HPV (human papillomavirus) test positive 03/19/2007    see problem list     Controlled substance agreement signed- reviewed-no concerns 2/22/19 3/18/2020     Pap smear for cervical cancer screening 2008    biopsy taken and ok       PSHx: reviewed  Past Surgical History:   Procedure Laterality Date     NO HISTORY OF SURGERY          Soc Hx: No daily alcohol, no smoking  Social History     Socioeconomic History     Marital status:  Single     Spouse name: Not on file     Number of children: Not on file     Years of education: Not on file     Highest education level: Not on file   Occupational History     Not on file   Social Needs     Financial resource strain: Not on file     Food insecurity     Worry: Not on file     Inability: Not on file     Transportation needs     Medical: Not on file     Non-medical: Not on file   Tobacco Use     Smoking status: Former Smoker     Packs/day: 0.10     Years: 5.00     Pack years: 0.50     Types: Cigarettes     Quit date: 2012     Years since quittin.5     Smokeless tobacco: Never Used   Substance and Sexual Activity     Alcohol use: Yes     Frequency: 2-3 times a week     Drinks per session: 3 or 4     Binge frequency: Never     Comment: 3 drinks 2 times a week or more right now      Drug use: No     Sexual activity: Yes     Partners: Male     Birth control/protection: I.U.D.     Comment: Mirena    Lifestyle     Physical activity     Days per week: Not on file     Minutes per session: Not on file     Stress: Not on file   Relationships     Social connections     Talks on phone: Not on file     Gets together: Not on file     Attends Shinto service: Not on file     Active member of club or organization: Not on file     Attends meetings of clubs or organizations: Not on file     Relationship status: Not on file     Intimate partner violence     Fear of current or ex partner: Not on file     Emotionally abused: Not on file     Physically abused: Not on file     Forced sexual activity: Not on file   Other Topics Concern     Parent/sibling w/ CABG, MI or angioplasty before 65F 55M? Not Asked   Social History Narrative     Not on file        Fam Hx: reviewed  Family History   Problem Relation Age of Onset     Cancer Mother         thyroid     Hypertension Father      Lipids Father      Attention Deficit Disorder Father      Breast Cancer Other      Depression Other      Anxiety Disorder Other       "Mental Illness Other      Substance Abuse Other         Most of father's side     Mental Illness Maternal Grandmother         Borderline personality         Screening: reviewed      All: reviewed    Meds: reviewed  Current Outpatient Medications   Medication Sig Dispense Refill     [START ON 6/25/2021] amphetamine-dextroamphetamine (ADDERALL) 15 MG tablet Take 1 tablet (15 mg) by mouth 2 times daily 60 tablet 0     [START ON 7/24/2021] amphetamine-dextroamphetamine (ADDERALL) 15 MG tablet Take 1 tablet (15 mg) by mouth 2 times daily 60 tablet 0     [START ON 8/24/2021] amphetamine-dextroamphetamine (ADDERALL) 15 MG tablet Take 1 tablet (15 mg) by mouth 2 times daily 60 tablet 0     buPROPion (WELLBUTRIN XL) 300 MG 24 hr tablet Take 1 tablet (300 mg) by mouth every morning 90 tablet 1     levonorgestrel (MIRENA) 20 MCG/24HR IUD 1 each by Intrauterine route once       multivitamin, therapeutic (THERA-VIT) TABS tablet Take 1 tablet by mouth daily       omeprazole (PRILOSEC) 40 MG DR capsule Take 1 capsule (40 mg) by mouth daily 90 capsule 0       OBJECTIVE:                                                    Physical Exam :  Blood pressure 108/74, pulse 103, temperature 98.5  F (36.9  C), temperature source Oral, resp. rate 18, height 1.695 m (5' 6.75\"), weight 64.7 kg (142 lb 9.6 oz), SpO2 100 %, not currently breastfeeding.     NAD, appears comfortable  Skin clear, no rashes  Neck: supple, no JVD,  no thyroidmegaly  Lymph nodes non palpable in the cervical, supraclavicular axillaries,   Chest: clear to auscultation with good respiratory effort  Cardiac: S1S2, RRR, no mgr appreciated  Abdomen: soft, not tender, not distended, audible bowel sound, no hepatosplenomegaly, no palpable masses, no abdominal bruits  Extremities: no cyanosis, clubbing or edema.   Neuro: A, Ox3, no focal signs.  Breast exam in supine and erect position: they are symmetrical, no skin changes, no tenderness or nodes on palpation. Nipples are " erect, no skin lesions, no discharge on pressure.    Pelvic exam: deferred, no symptoms, no hx of abnormal pap         Mary Ann Maravilla MD  Internal Medicine          SUBJECTIVE:   CC: Gerri Rosado is an 30 year old woman who presents for preventive health visit.       Patient has been advised of split billing requirements and indicates understanding: Yes  Healthy Habits:     Getting at least 3 servings of Calcium per day:  Yes    Bi-annual eye exam:  Yes    Dental care twice a year:  NO    Sleep apnea or symptoms of sleep apnea:  Daytime drowsiness    Diet:  Regular (no restrictions)    Frequency of exercise:  None    Taking medications regularly:  Yes    Medication side effects:  Not applicable    PHQ-2 Total Score: 2    Additional concerns today:  No      Today's PHQ-2 Score:   PHQ-2 (  [employer]) 6/15/2021   Q1: Little interest or pleasure in doing things 1   Q2: Feeling down, depressed or hopeless 1   PHQ-2 Score 2   Q1: Little interest or pleasure in doing things Several days   Q2: Feeling down, depressed or hopeless Several days   PHQ-2 Score 2       Abuse: Current or Past (Physical, Sexual or Emotional) - No  Do you feel safe in your environment? Yes    Have you ever done Advance Care Planning? (For example, a Health Directive, POLST, or a discussion with a medical provider or your loved ones about your wishes): No, advance care planning information given to patient to review.  Patient declined advance care planning discussion at this time.    Social History     Tobacco Use     Smoking status: Former Smoker     Packs/day: 0.10     Years: 5.00     Pack years: 0.50     Types: Cigarettes     Quit date: 2012     Years since quittin.5     Smokeless tobacco: Never Used   Substance Use Topics     Alcohol use: Yes     Frequency: 2-3 times a week     Drinks per session: 3 or 4     Binge frequency: Never     Comment: 3 drinks 2 times a week or more right now          Alcohol Use 6/15/2021   Prescreen: >3  drinks/day or >7 drinks/week? No   Prescreen: >3 drinks/day or >7 drinks/week? -       Reviewed orders with patient.  Reviewed health maintenance and updated orders accordingly - Yes  Labs reviewed in EPIC    Breast Cancer Screening:  Any new diagnosis of family breast, ovarian, or bowel cancer? No    FSH-7: No flowsheet data found.      Pertinent mammograms are reviewed under the imaging tab.    History of abnormal Pap smear:   PAP / HPV Latest Ref Rng & Units 3/13/2019 10/20/2016 8/13/2013   PAP - NIL NIL NIL   HPV 16 DNA NEG:Negative Negative - -   HPV 18 DNA NEG:Negative Negative - -   OTHER HR HPV NEG:Negative Negative - -     Reviewed and updated as needed this visit by clinical staff  Tobacco  Allergies  Meds   Med Hx  Surg Hx  Fam Hx  Soc Hx        Reviewed and updated as needed this visit by Provider                    Review of Systems   Constitutional: Negative for chills and fever.   HENT: Negative for congestion, ear pain, hearing loss and sore throat.    Eyes: Negative for pain and visual disturbance.   Respiratory: Negative for cough and shortness of breath.    Cardiovascular: Negative for chest pain, palpitations and peripheral edema.   Gastrointestinal: Negative for abdominal pain, constipation, diarrhea, heartburn, hematochezia and nausea.   Breasts:  Positive for tenderness. Negative for breast mass and discharge.   Genitourinary: Negative for dysuria, frequency, genital sores, hematuria, pelvic pain, urgency, vaginal bleeding and vaginal discharge.   Musculoskeletal: Negative for arthralgias, joint swelling and myalgias.   Skin: Negative for rash.   Neurological: Negative for dizziness, weakness, headaches and paresthesias.   Psychiatric/Behavioral: Negative for mood changes. The patient is not nervous/anxious.          Patient has been advised of split billing requirements and indicates understanding: Yes At the check in, at the    COUNSELING:  Reviewed preventive health  "counseling, as reflected in patient instructions       Regular exercise       Healthy diet/nutrition    Estimated body mass index is 23.04 kg/m  as calculated from the following:    Height as of this encounter: 1.695 m (5' 6.75\").    Weight as of 11/19/20: 66.2 kg (146 lb).        She reports that she quit smoking about 8 years ago. Her smoking use included cigarettes. She has a 0.50 pack-year smoking history. She has never used smokeless tobacco.      Counseling Resources:  ATP IV Guidelines  Pooled Cohorts Equation Calculator  Breast Cancer Risk Calculator  BRCA-Related Cancer Risk Assessment: FHS-7 Tool  FRAX Risk Assessment  ICSI Preventive Guidelines  Dietary Guidelines for Americans, 2010  USDA's MyPlate  ASA Prophylaxis  Lung CA Screening    Mary Ann Aguilera MD  [facility]  "

## 2021-06-16 LAB
ALBUMIN SERPL-MCNC: 4 G/DL (ref 3.4–5)
ALP SERPL-CCNC: 57 U/L (ref 40–150)
ALT SERPL W P-5'-P-CCNC: 41 U/L (ref 0–50)
ANION GAP SERPL CALCULATED.3IONS-SCNC: 7 MMOL/L (ref 3–14)
AST SERPL W P-5'-P-CCNC: 21 U/L (ref 0–45)
BILIRUB SERPL-MCNC: 0.8 MG/DL (ref 0.2–1.3)
BUN SERPL-MCNC: 13 MG/DL (ref 7–30)
CALCIUM SERPL-MCNC: 8.6 MG/DL (ref 8.5–10.1)
CHLORIDE SERPL-SCNC: 107 MMOL/L (ref 94–109)
CHOLEST SERPL-MCNC: 149 MG/DL
CO2 SERPL-SCNC: 24 MMOL/L (ref 20–32)
CREAT SERPL-MCNC: 0.87 MG/DL (ref 0.52–1.04)
GFR SERPL CREATININE-BSD FRML MDRD: 88 ML/MIN/{1.73_M2}
GLUCOSE SERPL-MCNC: 90 MG/DL (ref 70–99)
HDLC SERPL-MCNC: 58 MG/DL
LDLC SERPL CALC-MCNC: 68 MG/DL
NONHDLC SERPL-MCNC: 91 MG/DL
POTASSIUM SERPL-SCNC: 3.6 MMOL/L (ref 3.4–5.3)
PROT SERPL-MCNC: 7.4 G/DL (ref 6.8–8.8)
SODIUM SERPL-SCNC: 138 MMOL/L (ref 133–144)
TRIGL SERPL-MCNC: 115 MG/DL
TSH SERPL DL<=0.005 MIU/L-ACNC: 2.16 MU/L (ref 0.4–4)

## 2021-10-24 ENCOUNTER — HEALTH MAINTENANCE LETTER (OUTPATIENT)
Age: 31
End: 2021-10-24

## 2021-10-28 ENCOUNTER — MYC REFILL (OUTPATIENT)
Dept: INTERNAL MEDICINE | Facility: CLINIC | Age: 31
End: 2021-10-28

## 2021-10-28 DIAGNOSIS — F90.0 ATTENTION DEFICIT HYPERACTIVITY DISORDER (ADHD), PREDOMINANTLY INATTENTIVE TYPE: Chronic | ICD-10-CM

## 2021-10-28 RX ORDER — DEXTROAMPHETAMINE SACCHARATE, AMPHETAMINE ASPARTATE, DEXTROAMPHETAMINE SULFATE AND AMPHETAMINE SULFATE 3.75; 3.75; 3.75; 3.75 MG/1; MG/1; MG/1; MG/1
15 TABLET ORAL 2 TIMES DAILY
Qty: 60 TABLET | Refills: 0 | Status: SHIPPED | OUTPATIENT
Start: 2021-11-27 | End: 2022-02-17

## 2021-10-28 RX ORDER — DEXTROAMPHETAMINE SACCHARATE, AMPHETAMINE ASPARTATE, DEXTROAMPHETAMINE SULFATE AND AMPHETAMINE SULFATE 3.75; 3.75; 3.75; 3.75 MG/1; MG/1; MG/1; MG/1
15 TABLET ORAL 2 TIMES DAILY
Qty: 60 TABLET | Refills: 0 | Status: SHIPPED | OUTPATIENT
Start: 2021-10-28 | End: 2022-02-17

## 2021-10-28 RX ORDER — DEXTROAMPHETAMINE SACCHARATE, AMPHETAMINE ASPARTATE, DEXTROAMPHETAMINE SULFATE AND AMPHETAMINE SULFATE 3.75; 3.75; 3.75; 3.75 MG/1; MG/1; MG/1; MG/1
15 TABLET ORAL 2 TIMES DAILY
Qty: 60 TABLET | Refills: 0 | Status: SHIPPED | OUTPATIENT
Start: 2021-12-27 | End: 2022-02-17

## 2021-10-28 RX ORDER — DEXTROAMPHETAMINE SACCHARATE, AMPHETAMINE ASPARTATE, DEXTROAMPHETAMINE SULFATE AND AMPHETAMINE SULFATE 3.75; 3.75; 3.75; 3.75 MG/1; MG/1; MG/1; MG/1
15 TABLET ORAL 2 TIMES DAILY
Qty: 60 TABLET | Refills: 0 | Status: CANCELLED | OUTPATIENT
Start: 2021-10-28

## 2022-01-21 ENCOUNTER — MYC REFILL (OUTPATIENT)
Dept: INTERNAL MEDICINE | Facility: CLINIC | Age: 32
End: 2022-01-21
Payer: COMMERCIAL

## 2022-01-21 DIAGNOSIS — F90.0 ATTENTION DEFICIT HYPERACTIVITY DISORDER (ADHD), PREDOMINANTLY INATTENTIVE TYPE: Chronic | ICD-10-CM

## 2022-01-22 RX ORDER — DEXTROAMPHETAMINE SACCHARATE, AMPHETAMINE ASPARTATE, DEXTROAMPHETAMINE SULFATE AND AMPHETAMINE SULFATE 3.75; 3.75; 3.75; 3.75 MG/1; MG/1; MG/1; MG/1
15 TABLET ORAL 2 TIMES DAILY
Qty: 60 TABLET | Refills: 0 | OUTPATIENT
Start: 2022-01-22

## 2022-02-17 ENCOUNTER — VIRTUAL VISIT (OUTPATIENT)
Dept: INTERNAL MEDICINE | Facility: CLINIC | Age: 32
End: 2022-02-17
Payer: COMMERCIAL

## 2022-02-17 DIAGNOSIS — F90.0 ATTENTION DEFICIT HYPERACTIVITY DISORDER (ADHD), PREDOMINANTLY INATTENTIVE TYPE: Chronic | ICD-10-CM

## 2022-02-17 DIAGNOSIS — F32.A DEPRESSION, UNSPECIFIED DEPRESSION TYPE: ICD-10-CM

## 2022-02-17 PROCEDURE — 99213 OFFICE O/P EST LOW 20 MIN: CPT | Mod: 95 | Performed by: INTERNAL MEDICINE

## 2022-02-17 RX ORDER — DEXTROAMPHETAMINE SACCHARATE, AMPHETAMINE ASPARTATE, DEXTROAMPHETAMINE SULFATE AND AMPHETAMINE SULFATE 3.75; 3.75; 3.75; 3.75 MG/1; MG/1; MG/1; MG/1
15 TABLET ORAL 2 TIMES DAILY
Qty: 60 TABLET | Refills: 0 | Status: SHIPPED | OUTPATIENT
Start: 2022-04-15 | End: 2022-08-16

## 2022-02-17 RX ORDER — BUPROPION HYDROCHLORIDE 300 MG/1
300 TABLET ORAL EVERY MORNING
Qty: 90 TABLET | Refills: 1 | Status: SHIPPED | OUTPATIENT
Start: 2022-02-17 | End: 2022-11-03

## 2022-02-17 RX ORDER — DEXTROAMPHETAMINE SACCHARATE, AMPHETAMINE ASPARTATE, DEXTROAMPHETAMINE SULFATE AND AMPHETAMINE SULFATE 3.75; 3.75; 3.75; 3.75 MG/1; MG/1; MG/1; MG/1
15 TABLET ORAL 2 TIMES DAILY
Qty: 60 TABLET | Refills: 0 | Status: SHIPPED | OUTPATIENT
Start: 2022-02-17 | End: 2022-08-16

## 2022-02-17 RX ORDER — DEXTROAMPHETAMINE SACCHARATE, AMPHETAMINE ASPARTATE, DEXTROAMPHETAMINE SULFATE AND AMPHETAMINE SULFATE 3.75; 3.75; 3.75; 3.75 MG/1; MG/1; MG/1; MG/1
15 TABLET ORAL 2 TIMES DAILY
Qty: 60 TABLET | Refills: 0 | Status: SHIPPED | OUTPATIENT
Start: 2022-03-17 | End: 2022-06-26

## 2022-02-17 ASSESSMENT — ANXIETY QUESTIONNAIRES
IF YOU CHECKED OFF ANY PROBLEMS ON THIS QUESTIONNAIRE, HOW DIFFICULT HAVE THESE PROBLEMS MADE IT FOR YOU TO DO YOUR WORK, TAKE CARE OF THINGS AT HOME, OR GET ALONG WITH OTHER PEOPLE: SOMEWHAT DIFFICULT
5. BEING SO RESTLESS THAT IT IS HARD TO SIT STILL: NOT AT ALL
GAD7 TOTAL SCORE: 4
1. FEELING NERVOUS, ANXIOUS, OR ON EDGE: SEVERAL DAYS
6. BECOMING EASILY ANNOYED OR IRRITABLE: SEVERAL DAYS
2. NOT BEING ABLE TO STOP OR CONTROL WORRYING: SEVERAL DAYS
3. WORRYING TOO MUCH ABOUT DIFFERENT THINGS: NOT AT ALL
7. FEELING AFRAID AS IF SOMETHING AWFUL MIGHT HAPPEN: SEVERAL DAYS

## 2022-02-17 ASSESSMENT — PATIENT HEALTH QUESTIONNAIRE - PHQ9
SUM OF ALL RESPONSES TO PHQ QUESTIONS 1-9: 4
5. POOR APPETITE OR OVEREATING: NOT AT ALL

## 2022-02-17 NOTE — PROGRESS NOTES
Gerri is a 31 year old who is being evaluated via a billable video visit.      How would you like to obtain your AVS? Mail a copy  If the video visit is dropped, the invitation should be resent by: Text to cell phone: [phone]  Will anyone else be joining your video visit? No        This is a VIDEO ( using Doximity)  encounter with the patient.       Location of the provider : office   Location of the patient : home      08:01 --- first attempt     08:10 - 08:20         Dr Maravilla's note      Patient's instructions / PLAN:                                                        Plan:  1. Continue same meds, same doses for now   2. Schedule your Annual Exam on [address] 15 or after.         ASSESSMENT & PLAN:                                                      (F90.0) ADHD, inattentive type  Comment: Controlled    Plan: amphetamine-dextroamphetamine (ADDERALL) 15 MG         tablet, amphetamine-dextroamphetamine         (ADDERALL) 15 MG tablet,         amphetamine-dextroamphetamine (ADDERALL) 15 MG         tablet            (F32.A) Depression, unspecified depression type  Comment: Controlled    Plan: buPROPion (WELLBUTRIN XL) 300 MG 24 hr tablet               Chief complaint:                                                      ADHD    SUBJECTIVE:                                                    History of present illness:    ADHD   -- she feels the Adderall 15 mg twice a day is helping       Review of Systems:                                                      ROS: negative for fever, chills, cough, wheezes, chest pain, shortness of breath, vomiting, abdominal pain, leg swelling         OBJECTIVE:           An actual physical exam can't be done during phone visit   A limited exam can sometimes be performed by video visit   NAD      PMHx: reviewed  Past Medical History:   Diagnosis Date     Abnormal Pap smear of cervix 03/19/2007    see problem list     Anxiety      Cervical high risk HPV (human papillomavirus)  test positive 03/19/2007    see problem list     Controlled substance agreement signed- reviewed-no concerns 2/22/19 3/18/2020     Pap smear for cervical cancer screening 2008    biopsy taken and ok      PSHx: reviewed  Past Surgical History:   Procedure Laterality Date     NO HISTORY OF SURGERY          Meds: reviewed  Current Outpatient Medications   Medication Sig Dispense Refill     amphetamine-dextroamphetamine (ADDERALL) 15 MG tablet Take 1 tablet (15 mg) by mouth 2 times daily 60 tablet 0     amphetamine-dextroamphetamine (ADDERALL) 15 MG tablet Take 1 tablet (15 mg) by mouth 2 times daily 60 tablet 0     amphetamine-dextroamphetamine (ADDERALL) 15 MG tablet Take 1 tablet (15 mg) by mouth 2 times daily 60 tablet 0     buPROPion (WELLBUTRIN XL) 300 MG 24 hr tablet Take 1 tablet (300 mg) by mouth every morning 90 tablet 1     levonorgestrel (MIRENA) 20 MCG/24HR IUD 1 each by Intrauterine route once       multivitamin, therapeutic (THERA-VIT) TABS tablet Take 1 tablet by mouth daily       omeprazole (PRILOSEC) 40 MG DR capsule Take 1 capsule (40 mg) by mouth daily 90 capsule 4       Soc Hx: reviewed  Fam Hx: reviewed          Mary Ann Maravilla MD  Internal Medicine       History of Present Illness     Reason for visit:  Med refill    She eats 0-1 servings of fruits and vegetables daily.She consumes 0 sweetened beverage(s) daily.She exercises with enough effort to increase her heart rate 9 or less minutes per day.  She exercises with enough effort to increase her heart rate 3 or less days per week.   She is taking medications regularly.

## 2022-02-18 ASSESSMENT — ANXIETY QUESTIONNAIRES: GAD7 TOTAL SCORE: 4

## 2022-06-26 ENCOUNTER — MYC REFILL (OUTPATIENT)
Dept: INTERNAL MEDICINE | Facility: CLINIC | Age: 32
End: 2022-06-26

## 2022-06-26 DIAGNOSIS — F90.0 ATTENTION DEFICIT HYPERACTIVITY DISORDER (ADHD), PREDOMINANTLY INATTENTIVE TYPE: Chronic | ICD-10-CM

## 2022-06-27 RX ORDER — DEXTROAMPHETAMINE SACCHARATE, AMPHETAMINE ASPARTATE, DEXTROAMPHETAMINE SULFATE AND AMPHETAMINE SULFATE 3.75; 3.75; 3.75; 3.75 MG/1; MG/1; MG/1; MG/1
15 TABLET ORAL 2 TIMES DAILY
Qty: 60 TABLET | Refills: 0 | Status: SHIPPED | OUTPATIENT
Start: 2022-06-27 | End: 2022-08-16

## 2022-07-31 ENCOUNTER — HEALTH MAINTENANCE LETTER (OUTPATIENT)
Age: 32
End: 2022-07-31

## 2022-08-16 ENCOUNTER — OFFICE VISIT (OUTPATIENT)
Dept: INTERNAL MEDICINE | Facility: CLINIC | Age: 32
End: 2022-08-16
Payer: COMMERCIAL

## 2022-08-16 ENCOUNTER — MYC MEDICAL ADVICE (OUTPATIENT)
Dept: INTERNAL MEDICINE | Facility: CLINIC | Age: 32
End: 2022-08-16

## 2022-08-16 VITALS
TEMPERATURE: 97.8 F | HEART RATE: 106 BPM | OXYGEN SATURATION: 100 % | SYSTOLIC BLOOD PRESSURE: 108 MMHG | RESPIRATION RATE: 18 BRPM | DIASTOLIC BLOOD PRESSURE: 75 MMHG | BODY MASS INDEX: 21.94 KG/M2 | WEIGHT: 139.8 LBS | HEIGHT: 67 IN

## 2022-08-16 DIAGNOSIS — F90.0 ATTENTION DEFICIT HYPERACTIVITY DISORDER (ADHD), PREDOMINANTLY INATTENTIVE TYPE: Chronic | ICD-10-CM

## 2022-08-16 DIAGNOSIS — Z00.00 ROUTINE GENERAL MEDICAL EXAMINATION AT A HEALTH CARE FACILITY: Primary | ICD-10-CM

## 2022-08-16 DIAGNOSIS — M79.641 PAIN OF RIGHT HAND: ICD-10-CM

## 2022-08-16 DIAGNOSIS — K21.00 GASTROESOPHAGEAL REFLUX DISEASE WITH ESOPHAGITIS WITHOUT HEMORRHAGE: ICD-10-CM

## 2022-08-16 DIAGNOSIS — Z11.59 NEED FOR HEPATITIS C SCREENING TEST: ICD-10-CM

## 2022-08-16 LAB
ALBUMIN SERPL-MCNC: 4.1 G/DL (ref 3.4–5)
ALP SERPL-CCNC: 58 U/L (ref 40–150)
ALT SERPL W P-5'-P-CCNC: 32 U/L (ref 0–50)
AMPHETAMINES UR QL: DETECTED
ANION GAP SERPL CALCULATED.3IONS-SCNC: 9 MMOL/L (ref 3–14)
AST SERPL W P-5'-P-CCNC: 19 U/L (ref 0–45)
BARBITURATES UR QL SCN: NOT DETECTED
BENZODIAZ UR QL SCN: NOT DETECTED
BILIRUB SERPL-MCNC: 0.5 MG/DL (ref 0.2–1.3)
BUN SERPL-MCNC: 14 MG/DL (ref 7–30)
BUPRENORPHINE UR QL: NOT DETECTED
CALCIUM SERPL-MCNC: 9.5 MG/DL (ref 8.5–10.1)
CANNABINOIDS UR QL: NOT DETECTED
CHLORIDE BLD-SCNC: 104 MMOL/L (ref 94–109)
CHOLEST SERPL-MCNC: 199 MG/DL
CO2 SERPL-SCNC: 24 MMOL/L (ref 20–32)
COCAINE UR QL SCN: NOT DETECTED
CREAT SERPL-MCNC: 0.77 MG/DL (ref 0.52–1.04)
D-METHAMPHET UR QL: NOT DETECTED
ERYTHROCYTE [DISTWIDTH] IN BLOOD BY AUTOMATED COUNT: 12.3 % (ref 10–15)
FASTING STATUS PATIENT QL REPORTED: YES
GFR SERPL CREATININE-BSD FRML MDRD: >90 ML/MIN/1.73M2
GLUCOSE BLD-MCNC: 84 MG/DL (ref 70–99)
HCT VFR BLD AUTO: 43.8 % (ref 35–47)
HCV AB SERPL QL IA: NONREACTIVE
HDLC SERPL-MCNC: 67 MG/DL
HGB BLD-MCNC: 14.8 G/DL (ref 11.7–15.7)
LDLC SERPL CALC-MCNC: 105 MG/DL
MCH RBC QN AUTO: 31 PG (ref 26.5–33)
MCHC RBC AUTO-ENTMCNC: 33.8 G/DL (ref 31.5–36.5)
MCV RBC AUTO: 92 FL (ref 78–100)
METHADONE UR QL SCN: NOT DETECTED
NONHDLC SERPL-MCNC: 132 MG/DL
OPIATES UR QL SCN: NOT DETECTED
OXYCODONE UR QL SCN: NOT DETECTED
PCP UR QL SCN: NOT DETECTED
PLATELET # BLD AUTO: 325 10E3/UL (ref 150–450)
POTASSIUM BLD-SCNC: 4 MMOL/L (ref 3.4–5.3)
PROPOXYPH UR QL: NOT DETECTED
PROT SERPL-MCNC: 7.6 G/DL (ref 6.8–8.8)
RBC # BLD AUTO: 4.78 10E6/UL (ref 3.8–5.2)
SODIUM SERPL-SCNC: 137 MMOL/L (ref 133–144)
TRICYCLICS UR QL SCN: NOT DETECTED
TRIGL SERPL-MCNC: 136 MG/DL
TSH SERPL DL<=0.005 MIU/L-ACNC: 2.34 MU/L (ref 0.4–4)
WBC # BLD AUTO: 5.6 10E3/UL (ref 4–11)

## 2022-08-16 PROCEDURE — 84443 ASSAY THYROID STIM HORMONE: CPT | Performed by: INTERNAL MEDICINE

## 2022-08-16 PROCEDURE — 85027 COMPLETE CBC AUTOMATED: CPT | Performed by: INTERNAL MEDICINE

## 2022-08-16 PROCEDURE — 36415 COLL VENOUS BLD VENIPUNCTURE: CPT | Performed by: INTERNAL MEDICINE

## 2022-08-16 PROCEDURE — 80306 DRUG TEST PRSMV INSTRMNT: CPT | Performed by: INTERNAL MEDICINE

## 2022-08-16 PROCEDURE — 80061 LIPID PANEL: CPT | Performed by: INTERNAL MEDICINE

## 2022-08-16 PROCEDURE — 90715 TDAP VACCINE 7 YRS/> IM: CPT | Performed by: INTERNAL MEDICINE

## 2022-08-16 PROCEDURE — 99214 OFFICE O/P EST MOD 30 MIN: CPT | Mod: 25 | Performed by: INTERNAL MEDICINE

## 2022-08-16 PROCEDURE — 99395 PREV VISIT EST AGE 18-39: CPT | Mod: 25 | Performed by: INTERNAL MEDICINE

## 2022-08-16 PROCEDURE — 80053 COMPREHEN METABOLIC PANEL: CPT | Performed by: INTERNAL MEDICINE

## 2022-08-16 PROCEDURE — 86803 HEPATITIS C AB TEST: CPT | Performed by: INTERNAL MEDICINE

## 2022-08-16 PROCEDURE — 90471 IMMUNIZATION ADMIN: CPT | Performed by: INTERNAL MEDICINE

## 2022-08-16 RX ORDER — DEXTROAMPHETAMINE SACCHARATE, AMPHETAMINE ASPARTATE, DEXTROAMPHETAMINE SULFATE AND AMPHETAMINE SULFATE 3.75; 3.75; 3.75; 3.75 MG/1; MG/1; MG/1; MG/1
15 TABLET ORAL 2 TIMES DAILY
Qty: 60 TABLET | Refills: 0 | Status: SHIPPED | OUTPATIENT
Start: 2022-08-16 | End: 2022-12-23

## 2022-08-16 RX ORDER — DEXTROAMPHETAMINE SACCHARATE, AMPHETAMINE ASPARTATE, DEXTROAMPHETAMINE SULFATE AND AMPHETAMINE SULFATE 3.75; 3.75; 3.75; 3.75 MG/1; MG/1; MG/1; MG/1
15 TABLET ORAL 2 TIMES DAILY
Qty: 60 TABLET | Refills: 0 | Status: SHIPPED | OUTPATIENT
Start: 2022-09-15 | End: 2023-06-09

## 2022-08-16 RX ORDER — DEXTROAMPHETAMINE SACCHARATE, AMPHETAMINE ASPARTATE, DEXTROAMPHETAMINE SULFATE AND AMPHETAMINE SULFATE 3.75; 3.75; 3.75; 3.75 MG/1; MG/1; MG/1; MG/1
15 TABLET ORAL 2 TIMES DAILY
Qty: 60 TABLET | Refills: 0 | Status: SHIPPED | OUTPATIENT
Start: 2022-10-14 | End: 2023-02-01

## 2022-08-16 RX ORDER — CHLORAL HYDRATE 500 MG
2 CAPSULE ORAL DAILY
COMMUNITY

## 2022-08-16 ASSESSMENT — ENCOUNTER SYMPTOMS
NERVOUS/ANXIOUS: 0
FEVER: 0
SORE THROAT: 0
CONSTIPATION: 0
SHORTNESS OF BREATH: 0
DIARRHEA: 0
DIZZINESS: 0
PALPITATIONS: 0
DYSURIA: 0
HEMATOCHEZIA: 0
ABDOMINAL PAIN: 0
NAUSEA: 1
PARESTHESIAS: 0
HEADACHES: 1
HEMATURIA: 0
FREQUENCY: 0
MYALGIAS: 1
WEAKNESS: 0
ARTHRALGIAS: 1
COUGH: 0
JOINT SWELLING: 0
HEARTBURN: 0
EYE PAIN: 0
CHILLS: 0

## 2022-08-16 ASSESSMENT — PATIENT HEALTH QUESTIONNAIRE - PHQ9
SUM OF ALL RESPONSES TO PHQ QUESTIONS 1-9: 4
10. IF YOU CHECKED OFF ANY PROBLEMS, HOW DIFFICULT HAVE THESE PROBLEMS MADE IT FOR YOU TO DO YOUR WORK, TAKE CARE OF THINGS AT HOME, OR GET ALONG WITH OTHER PEOPLE: NOT DIFFICULT AT ALL
SUM OF ALL RESPONSES TO PHQ QUESTIONS 1-9: 4

## 2022-08-16 NOTE — NURSING NOTE
"/75 (BP Location: Right arm, Patient Position: Sitting, Cuff Size: Adult Regular)   Pulse 106   Temp 97.8  F (36.6  C) (Oral)   Resp 18   Ht 1.702 m (5' 7\")   Wt 63.4 kg (139 lb 12.8 oz)   SpO2 100%   BMI 21.90 kg/m      "

## 2022-08-16 NOTE — PROGRESS NOTES
Dr Maravilla's note    Patient's instructions / PLAN:                                                        Plan:  1. Ortho and occupational therapy referral  2.  Labs today - [address]   3. Continue same meds, same doses for now   4. Tetanus shot today  5. Decrease Omeprazole to 20 mg daily for a month. If you are feeling well, stop it and take Famotidine/Pepcid 20 mg twice a day   6. follow up Feb -- video ok      ASSESSMENT & PLAN:                                                      (Z00.00) Routine general medical examination at a health care facility  (primary encounter diagnosis)  Comment:   Plan:     (M79.641) Pain of right hand  Comment: Ulnar neuropathy sec typing ?  Plan: Occupational Therapy Referral, Orthopedic          Referral            (F90.0) ADHD, inattentive type  Comment: doing well on the present dose   Plan: Drug Abuse Screen Panel 13, Urine (Pain Care         Package) - lab collect,         amphetamine-dextroamphetamine (ADDERALL) 15 MG         tablet, amphetamine-dextroamphetamine         (ADDERALL) 15 MG tablet,         amphetamine-dextroamphetamine (ADDERALL) 15 MG         tablet            (K21.00) Gastroesophageal reflux disease with esophagitis without hemorrhage  Comment: Controlled  w 40 mg. We Discussed  The long term side effects PPI.   Plan: omeprazole (PRILOSEC) 20 MG DR capsule            (Z11.59) Need for hepatitis C screening test  Comment:   Plan: Hepatitis C Screen Reflex to HCV RNA Quant and         Genotype, CBC with platelets, Comprehensive         metabolic panel, Lipid panel reflex to direct         LDL Fasting, TSH with free T4 reflex               Chief Complaint:                                                        Annual exam  Follow up chronic medical problems      SUBJECTIVE:                                                    History of present illness     We reviewed the chronic medical problems as above.   I reviewed the recent tests results in Epic      R hand pain  -- from the elbow to the 4th and 5th fingers. Sometimes the 2nd finger.  -- the pain can become severe to the point she has to type only with the left hand          ROS:     See below        PMHx: - reviewed  Past Medical History:   Diagnosis Date     Abnormal Pap smear of cervix 2007    see problem list     Anxiety      Cervical high risk HPV (human papillomavirus) test positive 2007    see problem list     Controlled substance agreement signed- reviewed-no concerns 2/22/19 3/18/2020     Pap smear for cervical cancer screening 2008    biopsy taken and ok       PSHx: reviewed  Past Surgical History:   Procedure Laterality Date     NO HISTORY OF SURGERY          Soc Hx: No daily alcohol, no smoking  Social History     Socioeconomic History     Marital status: Single     Spouse name: Not on file     Number of children: Not on file     Years of education: Not on file     Highest education level: Not on file   Occupational History     Not on file   Tobacco Use     Smoking status: Former Smoker     Packs/day: 0.10     Years: 5.00     Pack years: 0.50     Types: Cigarettes     Quit date: 2012     Years since quittin.7     Smokeless tobacco: Never Used   Vaping Use     Vaping Use: Never used   Substance and Sexual Activity     Alcohol use: Yes     Comment: 3 drinks 2 times a week or more right now      Drug use: No     Sexual activity: Yes     Partners: Male     Birth control/protection: I.U.D.     Comment: Mirena    Other Topics Concern     Parent/sibling w/ CABG, MI or angioplasty before 65F 55M? Not Asked   Social History Narrative     Not on file     Social Determinants of Health     Financial Resource Strain: Not on file   Food Insecurity: Not on file   Transportation Needs: Not on file   Physical Activity: Not on file   Stress: Not on file   Social Connections: Not on file   Intimate Partner Violence: Not on file   Housing Stability: Not on file        Fam Hx: reviewed  Family  "History   Problem Relation Age of Onset     Cancer Mother         thyroid     Hypertension Father      Lipids Father      Attention Deficit Disorder Father      Breast Cancer Other      Depression Other      Anxiety Disorder Other      Mental Illness Other      Substance Abuse Other         Most of father's side     Mental Illness Maternal Grandmother         Borderline personality         Screening: reviewed      All: reviewed    Meds: reviewed  Current Outpatient Medications   Medication Sig Dispense Refill     amphetamine-dextroamphetamine (ADDERALL) 15 MG tablet Take 1 tablet (15 mg) by mouth 2 times daily 60 tablet 0     amphetamine-dextroamphetamine (ADDERALL) 15 MG tablet Take 1 tablet (15 mg) by mouth 2 times daily 60 tablet 0     amphetamine-dextroamphetamine (ADDERALL) 15 MG tablet Take 1 tablet (15 mg) by mouth 2 times daily 60 tablet 0     buPROPion (WELLBUTRIN XL) 300 MG 24 hr tablet Take 1 tablet (300 mg) by mouth every morning 90 tablet 1     fish oil-omega-3 fatty acids 1000 MG capsule Take 2 g by mouth daily       levonorgestrel (MIRENA) 20 MCG/24HR IUD 1 each by Intrauterine route once       multivitamin, therapeutic (THERA-VIT) TABS tablet Take 1 tablet by mouth daily       omeprazole (PRILOSEC) 40 MG DR capsule Take 1 capsule (40 mg) by mouth daily 90 capsule 4       OBJECTIVE:                                                    Physical Exam :  Blood pressure 108/75, pulse 106, temperature 97.8  F (36.6  C), temperature source Oral, resp. rate 18, height 1.702 m (5' 7\"), weight 63.4 kg (139 lb 12.8 oz), SpO2 100 %, not currently breastfeeding.     NAD, appears comfortable  Skin clear, no rashes  Neck: supple, no JVD,  no thyroidmegaly  Lymph nodes non palpable in the cervical, supraclavicular axillaries,   Chest: clear to auscultation with good respiratory effort  Cardiac: S1S2, RRR, no mgr appreciated  Abdomen: soft, not tender, not distended, audible bowel sound, no hepatosplenomegaly, no " palpable masses, no abdominal bruits  Extremities: no cyanosis, clubbing or edema. Hands no signs of arthritis, no tenderness over the elbow wrist  Neuro: A, Ox3, no focal signs.  Breast exam in supine and erect position: they are symmetrical, no skin changes, no tenderness or nodes on palpation. Nipples are erect, no skin lesions, no discharge on pressure.    Pelvic exam: deferred, no symptoms, no hx of abnormal pap         Mary Ann Maravilla MD  Internal Medicine        SUBJECTIVE:   CC: Gerri Rosado is an 32 year old woman who presents for preventive health visit.       Patient has been advised of split billing requirements and indicates understanding: Yes  Healthy Habits:     Getting at least 3 servings of Calcium per day:  Yes    Bi-annual eye exam:  NO    Dental care twice a year:  NO    Sleep apnea or symptoms of sleep apnea:  Daytime drowsiness    Diet:  Regular (no restrictions)    Frequency of exercise:  2-3 days/week    Duration of exercise:  Less than 15 minutes    Taking medications regularly:  Yes    Medication side effects:  None    PHQ-2 Total Score: 2    Additional concerns today:  Yes    Ability to successfully perform activities of daily living: Yes, no assistance needed  Home safety:  none identified   Hearing impairment: None             Today's PHQ-2 Score:   PHQ-2 ( 1999 Pfizer) 8/16/2022   Q1: Little interest or pleasure in doing things -   Q2: Feeling down, depressed or hopeless -   PHQ-2 Score -   PHQ-2 Total Score (12-17 Years)- Positive if 3 or more points; Administer PHQ-A if positive -   Q1: Little interest or pleasure in doing things More than half the days   Q2: Feeling down, depressed or hopeless Not at all   PHQ-2 Score Incomplete       Abuse: Current or Past (Physical, Sexual or Emotional) - No  Do you feel safe in your environment? Yes        Social History     Tobacco Use     Smoking status: Former Smoker     Packs/day: 0.10     Years: 5.00     Pack years: 0.50     Types:  Cigarettes     Quit date: 2012     Years since quittin.7     Smokeless tobacco: Never Used   Substance Use Topics     Alcohol use: Yes     Comment: 3 drinks 2 times a week or more right now      If you drink alcohol do you typically have >3 drinks per day or >7 drinks per week? Yes      Alcohol Use 6/15/2021   Prescreen: >3 drinks/day or >7 drinks/week? No   Prescreen: >3 drinks/day or >7 drinks/week? -   No flowsheet data found.    Reviewed orders with patient.  Reviewed health maintenance and updated orders accordingly - Yes  Labs reviewed in EPIC    Breast Cancer Screening:  Any new diagnosis of family breast, ovarian, or bowel cancer? No    FHS-7: No flowsheet data found.  click delete button to remove this line now    Pertinent mammograms are reviewed under the imaging tab.    History of abnormal Pap smear:   PAP / HPV Latest Ref Rng & Units 3/13/2019 10/20/2016 2013   PAP (Historical) - NIL NIL NIL   HPV16 NEG:Negative Negative - -   HPV18 NEG:Negative Negative - -   HRHPV NEG:Negative Negative - -     Reviewed and updated as needed this visit by clinical staff   Tobacco                  Reviewed and updated as needed this visit by Provider                       Review of Systems   Constitutional: Negative for chills and fever.   HENT: Positive for ear pain. Negative for congestion, hearing loss and sore throat.    Eyes: Negative for pain and visual disturbance.   Respiratory: Negative for cough and shortness of breath.    Cardiovascular: Negative for chest pain, palpitations and peripheral edema.   Gastrointestinal: Positive for nausea. Negative for abdominal pain, constipation, diarrhea, heartburn and hematochezia.   Genitourinary: Negative for dysuria, frequency, genital sores, hematuria and urgency.   Musculoskeletal: Positive for arthralgias and myalgias. Negative for joint swelling.   Skin: Negative for rash.   Neurological: Positive for headaches. Negative for dizziness, weakness and  "paresthesias.   Psychiatric/Behavioral: Negative for mood changes. The patient is not nervous/anxious.          Patient has been advised of split billing requirements and indicates understanding: Yes  At the check in, at the    COUNSELING:  Reviewed preventive health counseling, as reflected in patient instructions       Regular exercise       Healthy diet/nutrition    Estimated body mass index is 21.9 kg/m  as calculated from the following:    Height as of this encounter: 1.702 m (5' 7\").    Weight as of this encounter: 63.4 kg (139 lb 12.8 oz).        She reports that she quit smoking about 9 years ago. Her smoking use included cigarettes. She has a 0.50 pack-year smoking history. She has never used smokeless tobacco.      Counseling Resources:  ATP IV Guidelines  Pooled Cohorts Equation Calculator  Breast Cancer Risk Calculator  BRCA-Related Cancer Risk Assessment: FHS-7 Tool  FRAX Risk Assessment  ICSI Preventive Guidelines  Dietary Guidelines for Americans, 2010  [employer]'s MyPlate  ASA Prophylaxis  Lung CA Screening    Mary Ann Aguilera MD  [facility]  Answers for HPI/ROS submitted by the patient on 8/16/2022  If you checked off any problems, how difficult have these problems made it for you to do your work, take care of things at home, or get along with other people?: Not difficult at all  PHQ9 TOTAL SCORE: 4      "

## 2022-08-16 NOTE — PATIENT INSTRUCTIONS
Plan:  1. Ortho and occupational therapy referral  2.  Labs today - [address]   3. Continue same meds, same doses for now   4. Tetanus shot today  5. Decrease Omeprazole to 20 mg daily for a month. If you are feeling well, stop it and take Famotidine/Pepcid 20 mg twice a day   6. follow up Feb -- video ok

## 2022-08-16 NOTE — LETTER
[facility]  08/16/22  Patient: Gerri Rosado  YOB: 1990  Medical Record Number: [id number]                                                                                  Non-Opioid Controlled Substance Agreement    This is an agreement between you and your provider regarding safe and appropriate use of controlled substances prescribed by your care team. Controlled substances are?medicines that can cause physical and mental dependence (abuse).     There are strict laws about having and using these medicines. We here at [facility] are  committed to working with you in your efforts to get better. To support you in this work, we'll help you schedule regular office appointments for medicine refills. If we must cancel or change your appointment for any reason, we'll make sure you have enough medicine to last until your next appointment.     As a Provider, I will:     Listen carefully to your concerns while treating you with respect.     Recommend a treatment plan that I believe is in your best interest and may involve therapies other than medicine.      Talk with you often about the possible benefits and the risk of harm of any medicine that we prescribe for you.    Assess the safety of this medicine and check how well it works.      Provide a plan on how to taper (discontinue or go off) using this medicine if the decision is made to stop its use.      ::  As a Patient, I understand controlled substances:       Are prescribed by my care provider to help me function or work and manage my condition(s).?    Are strong medicines and can cause serious side effects.       Need to be taken exactly as prescribed.?Combining controlled substances with certain medicines or chemicals (such as illegal drugs, alcohol, sedatives, sleeping pills, and benzodiazepines) can be dangerous or even fatal.? If I stop taking my medicines suddenly, I may have severe withdrawal symptoms.     The  risks, benefits, and side effects of these medicine(s) were explained to me. I agree that:    1. I will take part in other treatments as advised by my care team. This may be psychiatry or counseling, physical therapy, behavioral therapy, group treatment or a referral to specialist.    2. I will keep all my appointments and understand this is part of the monitoring of controlled substances.?My care team may require an office visit for EVERY controlled substance refill. If I miss appointments or don t follow instructions, my care team may stop my medicine    3. I will take my medicines as prescribed. I will not change the dose or schedule unless my care team tells me to. There will be no refills if I run out early.      4. I may be asked to come to the clinic and complete a urine drug test or complete a pill count. If I don t give a urine sample or participate in a pill count, the care team may stop my medicine.    5. I will only receive controlled substance prescriptions from this clinic. If I am treated by another provider, I will tell them that I am taking controlled substances and that I have a treatment agreement with this provider. I will inform my [facility] care team within one business day if I am given a prescription for any controlled substance by another healthcare provider. My [facility] care team can contact other providers and pharmacists about my use of any medicines.    6. It is up to me to make sure that I don't run out of my medicines on weekends or holidays.?If my care team is willing to refill my prescription without a visit, I must request refills only during office hours. Refills may take up to 3 business days to process. I will use one pharmacy to fill all my controlled substance prescriptions. I will notify the clinic about any changes to my insurance or medicine availability.    7. I am responsible for my prescriptions. If the medicine/prescription is lost, stolen or destroyed,  it will not be replaced.?I also agree not to share controlled substance medicines with anyone.     8. I am aware I should not use any illegal or recreational drugs. I agree not to drink alcohol unless my care team says I can.     9. If I enroll in the [place] Medical Cannabis program, I will tell my care team before my next refill.    10. I will tell my care team right away if I become pregnant, have a new medical problem treated outside of my regular clinic, or have a change in my medicines.     11. I understand that this medicine can affect my thinking, judgment and reaction time.? Alcohol and drugs affect the brain and body, which can affect the safety of my driving. Being under the influence of alcohol or drugs can affect my decision-making, behaviors, personal safety and the safety of others. Driving while impaired (DWI) can occur if a person is driving, operating or in physical control of a car, motorcycle, boat, snowmobile, ATV, motorbike, off-road vehicle or any other motor vehicle ([place] Statute 169A.20). I understand the risk if I choose to drive or operate any vehicle or machinery.    I understand that if I do not follow any of the conditions above, my prescriptions or treatment may be stopped or changed.   I agree that my provider, clinic care team and pharmacy may work with any city, state or federal law enforcement agency that investigates the misuse, sale or other diversion of my controlled medicine. I will allow my provider to discuss my care with, or share a copy of, this agreement with any other treating provider, pharmacy or emergency room where I receive care.     I have read this agreement and have asked questions about anything I did not understand.    ________________________________________________________  Patient Signature - Gerri Rosado     ___________________                   Date     ________________________________________________________  Provider Signature - Mary Ann Powell  Crintea-Stoian, MD       ___________________                   Date     ________________________________________________________  Witness Signature (required if provider not present while patient signing)          ___________________                   Date

## 2022-10-15 ENCOUNTER — HEALTH MAINTENANCE LETTER (OUTPATIENT)
Age: 32
End: 2022-10-15

## 2022-12-11 ENCOUNTER — E-VISIT (OUTPATIENT)
Dept: INTERNAL MEDICINE | Facility: CLINIC | Age: 32
End: 2022-12-11
Payer: COMMERCIAL

## 2022-12-11 DIAGNOSIS — M54.2 NECK PAIN: Primary | ICD-10-CM

## 2022-12-11 PROCEDURE — 99421 OL DIG E/M SVC 5-10 MIN: CPT | Performed by: INTERNAL MEDICINE

## 2022-12-21 ENCOUNTER — MYC REFILL (OUTPATIENT)
Dept: INTERNAL MEDICINE | Facility: CLINIC | Age: 32
End: 2022-12-21

## 2022-12-21 DIAGNOSIS — F90.0 ATTENTION DEFICIT HYPERACTIVITY DISORDER (ADHD), PREDOMINANTLY INATTENTIVE TYPE: Chronic | ICD-10-CM

## 2022-12-21 RX ORDER — DEXTROAMPHETAMINE SACCHARATE, AMPHETAMINE ASPARTATE, DEXTROAMPHETAMINE SULFATE AND AMPHETAMINE SULFATE 3.75; 3.75; 3.75; 3.75 MG/1; MG/1; MG/1; MG/1
15 TABLET ORAL 2 TIMES DAILY
Qty: 60 TABLET | Refills: 0 | Status: CANCELLED | OUTPATIENT
Start: 2022-12-21

## 2022-12-21 RX ORDER — BACLOFEN 10 MG/1
10-20 TABLET ORAL
Qty: 60 TABLET | Refills: 0 | Status: SHIPPED | OUTPATIENT
Start: 2022-12-21 | End: 2023-06-09

## 2022-12-21 NOTE — TELEPHONE ENCOUNTER
Routing refill request to provider for review/approval because:  Drug not on the [facility] refill protocol       Agnieszka Ku RN BSN MSN  [facility]

## 2022-12-23 RX ORDER — DEXTROAMPHETAMINE SACCHARATE, AMPHETAMINE ASPARTATE, DEXTROAMPHETAMINE SULFATE AND AMPHETAMINE SULFATE 3.75; 3.75; 3.75; 3.75 MG/1; MG/1; MG/1; MG/1
15 TABLET ORAL 2 TIMES DAILY
Qty: 60 TABLET | Refills: 0 | Status: SHIPPED | OUTPATIENT
Start: 2022-12-23 | End: 2023-06-09

## 2022-12-28 ENCOUNTER — MYC MEDICAL ADVICE (OUTPATIENT)
Dept: INTERNAL MEDICINE | Facility: CLINIC | Age: 32
End: 2022-12-28

## 2023-01-10 NOTE — PROGRESS NOTES
ASSESSMENT & PLAN  Patient Instructions     1. Lateral epicondylitis of right elbow    2. Pain of right hand    3. Cervical myofascial strain, initial encounter      -Patient has chronic right arm pain due to tendinitis of the extensor muscles.  Patient has left-sided neck pain due to muscle strain  -Patient will start formal hand therapy and home exercise program for the right arm.  -Patient was start diclofenac 75 mg twice a day as needed.  -Patient was start home exercise program for the neck.  Patient may call us in the future after she is done hand therapy if she is having persistent neck pain for a formal therapy order  -Patient will follow up if pain does not improve  -Call direct clinic number [[phone]] at any time with questions or concerns.    Albert Yeo MD [facility] Orthopedics and Sports Medicine  [facility]          -----    SUBJECTIVE  Gerri Rosado is a/an 32 year old Right handed female who is seen in consultation at the request of  Mary Ann Aguilera M.D. for evaluation of right hand pain. The patient is seen by themselves.    Onset: on and off for 5 years(s) with pain returning and becoming more consistent over the last 6 months. Reports insidious onset without acute precipitating event.  Location of Pain: right hand - 3rd-5th fingers radiating up ulnar wrist and forearm to elbow  Rating of Pain at worst: 6-7/10  Rating of Pain Currently: 2/10  Worsened by:  / grasp, typing, cooking, swiping on phone  Better with: rest / activity avoidance  Treatments tried: rest/activity avoidance, ice, heat, Tylenol and ibuprofen  Associated symptoms: no distal numbness or tingling; denies weakness of right hand; denies swelling or warmth  Orthopedic history: YES - patient also complains of left posterior shoulder and neck pain ongoing for ~ 1 month. She denies acute injury / trauma. Pain comes and goes. Pain seems worse in the morning. Denies numbness, tingling or  "weakness in left arm.  Relevant surgical history: NO  Social history: works - mental health therapist    Past Medical History:   Diagnosis Date     Abnormal Pap smear of cervix 03/19/2007    see problem list     Anxiety      Cervical high risk HPV (human papillomavirus) test positive 03/19/2007    see problem list     Controlled substance agreement signed- reviewed-no concerns 2/22/19 3/18/2020     Pap smear for cervical cancer screening 2008    biopsy taken and ok     Social History     Socioeconomic History     Marital status: Single   Tobacco Use     Smoking status: Former     Packs/day: 0.10     Years: 5.00     Pack years: 0.50     Types: Cigarettes     Quit date: 12/2/2012     Years since quitting: 10.1     Smokeless tobacco: Never   Vaping Use     Vaping Use: Never used   Substance and Sexual Activity     Alcohol use: Yes     Comment: 3 drinks 2 times a week or more right now      Drug use: No     Sexual activity: Yes     Partners: Male     Birth control/protection: I.U.D.     Comment: Mirena          Patient's past medical, surgical, social, and family histories were reviewed today and no changes are noted.    REVIEW OF SYSTEMS:  10 point ROS is negative other than symptoms noted above in HPI, Past Medical History or as stated below  Constitutional: NEGATIVE for fever, chills, change in weight  Skin: NEGATIVE for worrisome rashes, moles or lesions  GI/: NEGATIVE for bowel or bladder changes  Neuro: NEGATIVE for weakness, dizziness or paresthesias    OBJECTIVE:  Ht 1.702 m (5' 7\")   Wt 63 kg (139 lb)   BMI 21.77 kg/m     General: healthy, alert and in no distress  HEENT: no scleral icterus or conjunctival erythema  Skin: no suspicious lesions or rash. No jaundice.  CV: regular rhythm by palpation  Resp: normal respiratory effort without conversational dyspnea   Psych: normal mood and affect  Gait: normal steady gait with appropriate coordination and balance  Neuro: normal light touch sensory exam of the " bilateral hands.    MSK:  RIGHT HAND  Inspection:    No swelling or obvious deformity or asymmetry  Palpation:   Carpals: normal   Metacarpals: normal   Thumb: normal   Fingers: normal  Range of Motion:    Full active flexion and extension at MCP, PIP, and DIP joints; normal finger cascade without malrotation.  Wrist pronation, supination, and ulnar/radial deviation normal.  Strength:  Grossly intact  Special Tests:    Positive: none      RIGHT ELBOW  Inspection:    No swelling, bruising, discoloration, or obvious deformity or asymmetry  Palpation:    Tender about the lateral epicondyle, common extensor tendon. Remainder of bony, ligamentous and tendinous landmarks are nontender.    Crepitus is Absent  Range of Motion:     Extension full / flexion full / pronation full / supination full  Strength:    No deficits in flexion, extension, pronation, or supination.  Special Tests:    Positive: Pain with resisted wrist extension, pain with resisted middle finger extension, pain with resisted supination    Negative: pain with resisted wrist flexion, pain with resisted pronation    CERVICAL SPINE  Inspection:    No redness, swelling, ecchymosis, overlying skin change, or gross deformity/asymmetry, normal cervical lordosis present  Palpation:    Tender about the paracervical musculature (left), levator scapula (left) and upper trapezius (left). Otherwise remainder of the landmarks and nontender.  Range of Motion:     Flexion limited by tightness limited by pain    Extension within normal limits    Right side bend within normal limits    Left side bend limited by tightness limited by pain    Right rotation within normal limits    Left rotation limited by tightness limited by pain  Strength:    Full strength throughout all neck muscles  Special Tests:    Positive: None      Independent visualization of the below image:  No results found for this or any previous visit (from the past 24 hour(s)).          Albert Yeo MD  [facility]

## 2023-01-24 ENCOUNTER — OFFICE VISIT (OUTPATIENT)
Dept: ORTHOPEDICS | Facility: CLINIC | Age: 33
End: 2023-01-24
Attending: INTERNAL MEDICINE
Payer: COMMERCIAL

## 2023-01-24 VITALS — WEIGHT: 139 LBS | HEIGHT: 67 IN | BODY MASS INDEX: 21.82 KG/M2

## 2023-01-24 DIAGNOSIS — S16.1XXA CERVICAL MYOFASCIAL STRAIN, INITIAL ENCOUNTER: ICD-10-CM

## 2023-01-24 DIAGNOSIS — M79.641 PAIN OF RIGHT HAND: ICD-10-CM

## 2023-01-24 DIAGNOSIS — M77.11 LATERAL EPICONDYLITIS OF RIGHT ELBOW: Primary | ICD-10-CM

## 2023-01-24 PROCEDURE — 99204 OFFICE O/P NEW MOD 45 MIN: CPT | Performed by: FAMILY MEDICINE

## 2023-01-24 RX ORDER — DICLOFENAC SODIUM 75 MG/1
75 TABLET, DELAYED RELEASE ORAL 2 TIMES DAILY PRN
Qty: 60 TABLET | Refills: 1 | Status: SHIPPED | OUTPATIENT
Start: 2023-01-24 | End: 2024-06-04

## 2023-01-24 NOTE — LETTER
1/24/2023         RE: Gerri Rosado  [address] [place] [address]        Dear Colleague,    Thank you for referring your patient, Gerri Rosado, to the [facility] [place]. Please see a copy of my visit note below.    ASSESSMENT & PLAN  Patient Instructions     1. Lateral epicondylitis of right elbow    2. Pain of right hand    3. Cervical myofascial strain, initial encounter      -Patient has chronic right arm pain due to tendinitis of the extensor muscles.  Patient has left-sided neck pain due to muscle strain  -Patient will start formal hand therapy and home exercise program for the right arm.  -Patient was start diclofenac 75 mg twice a day as needed.  -Patient was start home exercise program for the neck.  Patient may call us in the future after she is done hand therapy if she is having persistent neck pain for a formal therapy order  -Patient will follow up if pain does not improve  -Call direct clinic number [[phone]] at any time with questions or concerns.    Albert Yeo MD [facility]  [facility]          -----    SUBJECTIVE  Gerri Rosado is a/an 32 year old Right handed female who is seen in consultation at the request of  Mary Ann Aguilera M.D. for evaluation of right hand pain. The patient is seen by themselves.    Onset: on and off for 5 years(s) with pain returning and becoming more consistent over the last 6 months. Reports insidious onset without acute precipitating event.  Location of Pain: right hand - 3rd-5th fingers radiating up ulnar wrist and forearm to elbow  Rating of Pain at worst: 6-7/10  Rating of Pain Currently: 2/10  Worsened by:  / grasp, typing, cooking, swiping on phone  Better with: rest / activity avoidance  Treatments tried: rest/activity avoidance, ice, heat, Tylenol and ibuprofen  Associated symptoms: no distal numbness or tingling; denies weakness of  "right hand; denies swelling or warmth  Orthopedic history: YES - patient also complains of left posterior shoulder and neck pain ongoing for ~ 1 month. She denies acute injury / trauma. Pain comes and goes. Pain seems worse in the morning. Denies numbness, tingling or weakness in left arm.  Relevant surgical history: NO  Social history: works - mental health therapist    Past Medical History:   Diagnosis Date     Abnormal Pap smear of cervix 03/19/2007    see problem list     Anxiety      Cervical high risk HPV (human papillomavirus) test positive 03/19/2007    see problem list     Controlled substance agreement signed- reviewed-no concerns 2/22/19 3/18/2020     Pap smear for cervical cancer screening 2008    biopsy taken and ok     Social History     Socioeconomic History     Marital status: Single   Tobacco Use     Smoking status: Former     Packs/day: 0.10     Years: 5.00     Pack years: 0.50     Types: Cigarettes     Quit date: 12/2/2012     Years since quitting: 10.1     Smokeless tobacco: Never   Vaping Use     Vaping Use: Never used   Substance and Sexual Activity     Alcohol use: Yes     Comment: 3 drinks 2 times a week or more right now      Drug use: No     Sexual activity: Yes     Partners: Male     Birth control/protection: I.U.D.     Comment: Mirena          Patient's past medical, surgical, social, and family histories were reviewed today and no changes are noted.    REVIEW OF SYSTEMS:  10 point ROS is negative other than symptoms noted above in HPI, Past Medical History or as stated below  Constitutional: NEGATIVE for fever, chills, change in weight  Skin: NEGATIVE for worrisome rashes, moles or lesions  GI/: NEGATIVE for bowel or bladder changes  Neuro: NEGATIVE for weakness, dizziness or paresthesias    OBJECTIVE:  Ht 1.702 m (5' 7\")   Wt 63 kg (139 lb)   BMI 21.77 kg/m     General: healthy, alert and in no distress  HEENT: no scleral icterus or conjunctival erythema  Skin: no suspicious " lesions or rash. No jaundice.  CV: regular rhythm by palpation  Resp: normal respiratory effort without conversational dyspnea   Psych: normal mood and affect  Gait: normal steady gait with appropriate coordination and balance  Neuro: normal light touch sensory exam of the bilateral hands.    MSK:  RIGHT HAND  Inspection:    No swelling or obvious deformity or asymmetry  Palpation:   Carpals: normal   Metacarpals: normal   Thumb: normal   Fingers: normal  Range of Motion:    Full active flexion and extension at MCP, PIP, and DIP joints; normal finger cascade without malrotation.  Wrist pronation, supination, and ulnar/radial deviation normal.  Strength:  Grossly intact  Special Tests:    Positive: none      RIGHT ELBOW  Inspection:    No swelling, bruising, discoloration, or obvious deformity or asymmetry  Palpation:    Tender about the lateral epicondyle, common extensor tendon. Remainder of bony, ligamentous and tendinous landmarks are nontender.    Crepitus is Absent  Range of Motion:     Extension full / flexion full / pronation full / supination full  Strength:    No deficits in flexion, extension, pronation, or supination.  Special Tests:    Positive: Pain with resisted wrist extension, pain with resisted middle finger extension, pain with resisted supination    Negative: pain with resisted wrist flexion, pain with resisted pronation    CERVICAL SPINE  Inspection:    No redness, swelling, ecchymosis, overlying skin change, or gross deformity/asymmetry, normal cervical lordosis present  Palpation:    Tender about the paracervical musculature (left), levator scapula (left) and upper trapezius (left). Otherwise remainder of the landmarks and nontender.  Range of Motion:     Flexion limited by tightness limited by pain    Extension within normal limits    Right side bend within normal limits    Left side bend limited by tightness limited by pain    Right rotation within normal limits    Left rotation limited by  tightness limited by pain  Strength:    Full strength throughout all neck muscles  Special Tests:    Positive: None      Independent visualization of the below image:  No results found for this or any previous visit (from the past 24 hour(s)).          Albert Yeo MD [facility]        Again, thank you for allowing me to participate in the care of your patient.        Sincerely,        Albert Yeo, MD

## 2023-01-24 NOTE — PATIENT INSTRUCTIONS
1. Lateral epicondylitis of right elbow    2. Pain of right hand    3. Cervical myofascial strain, initial encounter      -Patient has chronic right arm pain due to tendinitis of the extensor muscles.  Patient has left-sided neck pain due to muscle strain  -Patient will start formal hand therapy and home exercise program for the right arm.  -Patient was start diclofenac 75 mg twice a day as needed.  -Patient was start home exercise program for the neck.  Patient may call us in the future after she is done hand therapy if she is having persistent neck pain for a formal therapy order  -Patient will follow up if pain does not improve  -Call direct clinic number [[phone]] at any time with questions or concerns.    Albert Yeo MD CAQSM  [place] [facility]  [facility] [place]

## 2023-02-01 ENCOUNTER — MYC REFILL (OUTPATIENT)
Dept: INTERNAL MEDICINE | Facility: CLINIC | Age: 33
End: 2023-02-01
Payer: COMMERCIAL

## 2023-02-01 ENCOUNTER — MYC MEDICAL ADVICE (OUTPATIENT)
Dept: INTERNAL MEDICINE | Facility: CLINIC | Age: 33
End: 2023-02-01
Payer: COMMERCIAL

## 2023-02-01 DIAGNOSIS — F90.0 ATTENTION DEFICIT HYPERACTIVITY DISORDER (ADHD), PREDOMINANTLY INATTENTIVE TYPE: Chronic | ICD-10-CM

## 2023-02-01 DIAGNOSIS — F32.A DEPRESSION, UNSPECIFIED DEPRESSION TYPE: ICD-10-CM

## 2023-02-03 RX ORDER — DEXTROAMPHETAMINE SACCHARATE, AMPHETAMINE ASPARTATE, DEXTROAMPHETAMINE SULFATE AND AMPHETAMINE SULFATE 3.75; 3.75; 3.75; 3.75 MG/1; MG/1; MG/1; MG/1
15 TABLET ORAL 2 TIMES DAILY
Qty: 60 TABLET | Refills: 0 | Status: SHIPPED | OUTPATIENT
Start: 2023-02-03 | End: 2023-03-09

## 2023-02-04 RX ORDER — BUPROPION HYDROCHLORIDE 150 MG/1
150 TABLET ORAL EVERY MORNING
Qty: 30 TABLET | Refills: 3 | Status: SHIPPED | OUTPATIENT
Start: 2023-02-04 | End: 2023-06-09

## 2023-02-11 NOTE — PROGRESS NOTES
Occupational Profile Information:  {HAND DOMINANCHand Therapy Initial Evaluation    Current Date:  2/17/2023    Diagnosis: ***  DOI: ***  DOS: ***  Procedure:  ***  Post:  ***w ***d    Precautions: ***    Subjective:  Gerri Rosado is a 32 year old female.    Patient reports symptoms of the {RIGHT /LEFT:735014} *** which occurred due to ***. Since onset symptoms are {progression since onset:398720}  General health as reported by patient is {HEALTHRATINGHANDTHERAPY:187142}.  Pertinent medical history includes:{HISTORYHANDTHERAPY:692814}  Medical allergies:{ALLERGIESHANDTHERAPY:184940}.  Surgical history: {SURGERIESHANDTHERAPYHX:497784}.  Medication history: {MEDICATIONSHANDTHERAPY:734491}.    Current occupation is ***  Job Tasks: {JOBTASKSHANDTHERAPY:068221}E:838841}  Prior functional level:  {PRIORFUNCTIONHANDTHERAPY:998135}  Patient reports symptoms of {SYMPTOMSHANDTHERAPY:650407}  Special tests:  {Special tests initial hx:935401}.    Previous treatment: ***  Barriers include:{BARRIERSHANDTHERAPY:591491}  [facility]ob[facility]  Transportation: {Counts include 234 beds at the [facility] TRANSPORTATION OCCUPATIONAL PROFILE:607394}    Currently {WORKHAND  Hand Therapy Initial Evaluation    Current Date:  2/17/2023    Diagnosis: Right lateral epicondylitis  DOI: History of symptoms since 2019, worsening and more frequent in last several months   Procedure:  None    Subjective:  Gerri Rosado is a 32 year old female.    Patient reports symptoms of the right forea which occurred due to ***. Since onset symptoms are {progression since onset:780526}  General health as reported by patient is {HEALTHRATINGHANDTHERAPY:917500}.  Pertinent medical history includes:{HISTORYHANDTHERAPY:347505}  Medical allergies:{ALLERGIESHANDTHERAPY:936381}.  Surgical history: {SURGERIESHANDTHERAPYHX:407562}.  Medication history: {MEDICATIONSHANDTHERAPY:398708}.    Current occupation is mental health therapist.   Job Tasks: Computer Work, Prolonged  Sitting, Repetitive Tasks, scrolling on phone   Leisure activities/hobbies: pickleball, video games    Functional Outcome Measure:   Upper Extremity Functional Index Score:  SCORE:   Column Totals: /80: (P) 69   (A lower score indicates greater disability.)    Objective:  Pain Level (Scale 0-10)   2/11/2023   At Rest 2/10   With Use 7/10     Pain Description  Date 2/11/2023   Location Dorsal wrist, hand and foreaam (IF and MF)   Pain Quality Sharp, Shooting and Stabbing   Frequency constant     Pain is worst  daytime or nighttime   Exacerbated by  Lifting, pushing, pulling, typing   Relieved by cold, heat, NSAIDs, otc medications, rest and stretch   Progression Gradually worsening     Posture  Forward Neck Posture and Rounded Forward Shoulders    Sensation  Tingling along superficial radial nerve distribution per patient report     ROM    Elbow and forearm AROM in all planes WNL, more painful with forearm supination.     Wrist 2/11/2023 2/11/2023   AROM (PROM) L R   Extension 98 84   Flexion 88 90+   RD NT NT   UD NT NT     Resisted Testing  Pain Report:  - none    + mild    ++ moderate    +++ severe    2/11/2023  R   Elbow Extension NT   Elbow Flexion NT   Supination  (-)   Pronation (-)   Wrist Ext with RD, Elbow at side +   Wrist Ext with UD, Elbow at side ++   Wrist Ext with RD, Elbow Ext NT   Wrist Ext with UD, Elbow Ext NT   Wrist Flex with RD, Elbow at side +   Wrist Flex with UD, Elbow at side (-)   Wrist Flex with RD, Elbow Ext NT   Wrist Flex with UD, Elbow Ext NT   EDC with Elbow at side ++   EDC with Elbow Ext NT   Long Finger Test Tentatively positive     Neural Tension Testing  RNT: Radial Neurodynamic Test (based on REMY Jhaveri's ULNT)   2/11/2023   0-5 Scale 5/5 S1   Position:   0/5: Arm across abdomen in coronal plane  1/5: Depress shoulder, ER to neutral ABD shoulder to 45 degrees  2/5: IR shoulder to end range, keep elbow at 90 degrees  3/5: Extend elbow to 0 degrees  4/5: Fully pronate  forearm  5/5: Flex wrist and fingers with UD  Notes:    (+) indicates beyond grade level but less than [facility] to next level    (-) indicates over [facility] to level    S1  onset/change of patient's symptoms    S2 definite stop point based on patient's discomfort level    Strength   (Measured in pounds)  Pain Report: - none  + mild    ++ moderate    +++ severe    2/11/2023 2/11/2023   Trials L R   1  2  3 76 40 +   Average 76 40 +       2/11/2023 2/11/2023    L R   Elbow Ext 76 30 ++     Palpation  Pain Report:  - none    + mild    ++ moderate    +++ severe    2/11/2023 (R)   Triangular Interval NT   Infraspinatus NT   Teres Major NT   Pec Major NT   Pec Minor NT   Proximal Triceps NT   Spiral Groove NT   Distal Triceps NT   Anconeus NT   ECRB at LEP +   ECU at LEP +   EDC at LEP +   Radial Head (-)   Extensor Wad (-)   PIN Site (-)     Assessment:  Patient presents with symptoms consistent with diagnosis of right elbow pain, with conservative intervention.     Patient's limitations or Problem List includes:  Pain, Decreased ROM/motion, Weakness, Hypomobility and Decreased  of the right wrist, hand and forearm, which interferes with the patient's ability to perform Self Care Tasks (dressing), Work Tasks, Sleep Patterns, Recreational Activities, Household Chores and Driving  as compared to previous level of function.    Rehab Potential:  Excellent - Return to full activity, no limitations    Patient will benefit from skilled Occupational Therapy to increase  strength, pinch strength and stability of wrist and decrease pain to return to previous activity level and resume normal daily tasks and to reach their rehab potential.    Barriers to Learning:  No barrier    Communication Issues:  Patient appears to be able to clearly communicate and understand verbal and written communication and follow directions correctly.    Chart Review: Chart Review and Brief history including review of medical and/or therapy  records relating to the presenting problem    Identified Performance Deficits: functional mobility, driving and community mobility, health management and maintenance, home establishment and management, meal preparation and cleanup, shopping, work and leisure activities    Assessment of Occupational Performance:  1-3 Performance Deficits    Clinical Decision Making (Complexity): Low complexity    Treatment Explanation:  The following has been discussed with the patient:  RX ordered/plan of care  Anticipated outcomes  Possible risks and side effects    Plan:  Frequency:  1 X week, once daily  Duration:  for 8 weeks    Treatment Plan:    Modalities:    US   Therapeutic Exercise:    AROM, PROM, Tendon Gliding and Isometrics  Therapeutic Activities:  Functional activities   Ergonomics, activity modification  Neuromuscular re-ed:   Nerve Gliding, Posture, Kinesiotaping, Strain Counter Strain, Isometrics and Stabilization  Manual Techniques:   Joint mobilization and Myofascial release  Orthotic Fabrication:    Static  Self Care:    Self Care Tasks, Ergonomic Considerations and Work Tasks    Discharge Plan:  Achieve all LTG.  Independent in home treatment program.  Reach maximal therapeutic benefit.    Home Program:   Warmth for stiffness  Ice after activity for pain  PROM with stretch to wrist extensors and flexors  TFM to LEP  MFR to extensors, avoiding PIN site  Wrist cock up orthosis (fabricated today)  Avoid activities that exacerbate pain in the elbow  Lift with elbows at side and forearms in neutral/supinated position    Next Visit:  Follow-up regarding orthosis fit and tolerance  Consider kinesiotape trial  Radial nerve glides PRN  Advanced extensor stretch  MFR  Additional activitty modification and joint protection education

## 2023-02-17 ENCOUNTER — THERAPY VISIT (OUTPATIENT)
Dept: OCCUPATIONAL THERAPY | Facility: CLINIC | Age: 33
End: 2023-02-17
Attending: FAMILY MEDICINE
Payer: COMMERCIAL

## 2023-02-17 DIAGNOSIS — M25.521 RIGHT ELBOW PAIN: Primary | ICD-10-CM

## 2023-02-17 DIAGNOSIS — M77.11 LATERAL EPICONDYLITIS OF RIGHT ELBOW: ICD-10-CM

## 2023-02-17 PROCEDURE — 97530 THERAPEUTIC ACTIVITIES: CPT | Mod: GO | Performed by: OCCUPATIONAL THERAPIST

## 2023-02-17 PROCEDURE — 97165 OT EVAL LOW COMPLEX 30 MIN: CPT | Mod: GO | Performed by: OCCUPATIONAL THERAPIST

## 2023-02-17 PROCEDURE — 97760 ORTHOTIC MGMT&TRAING 1ST ENC: CPT | Mod: GO | Performed by: OCCUPATIONAL THERAPIST

## 2023-02-17 PROCEDURE — 97110 THERAPEUTIC EXERCISES: CPT | Mod: GO | Performed by: OCCUPATIONAL THERAPIST

## 2023-02-17 NOTE — PROGRESS NOTES
Physical Therapy Initial Evaluation    Current Date:  2/17/2023    Patient Health History  Gerri Rosado being seen for tennis elbow.     Problem began: 9/1/2019.      Pain is reported as 3/10 on pain scale.  General health as reported by patient is good.       Medical allergies: none.   Surgeries include:  None.    Current medications:  Anti-depressants.    Current occupation is Mental Health Therapist.   Primary job tasks include:  Computer work and driving.                  Diagnosis: Right lateral epicondylitis  DOI: History of symptoms since 2019, worsening and more frequent in last several months   Procedure:  None    Functional Outcome Measure:   Upper Extremity Functional Index Score:  SCORE:   Column Totals: /80: (P) 69   (A lower score indicates greater disability.)    Objective:  Pain Level (Scale 0-10)   2/11/2023   At Rest 2/10   With Use 7/10     Pain Description  Date 2/11/2023   Location Dorsal wrist, hand and foreaam (IF and MF)   Pain Quality Sharp, Shooting and Stabbing   Frequency constant     Pain is worst  daytime or nighttime   Exacerbated by  Lifting, pushing, pulling, typing   Relieved by cold, heat, NSAIDs, otc medications, rest and stretch   Progression Gradually worsening     Posture  Forward Neck Posture and Rounded Forward Shoulders    Sensation  Tingling along superficial radial nerve distribution per patient report     ROM    Elbow and forearm AROM in all planes WNL, more painful with forearm supination.     Wrist 2/11/2023 2/11/2023   AROM (PROM) L R   Extension 98 84   Flexion 88 90+   RD NT NT   UD NT NT     Resisted Testing  Pain Report:  - none    + mild    ++ moderate    +++ severe    2/11/2023  R   Elbow Extension NT   Elbow Flexion NT   Supination  (-)   Pronation (-)   Wrist Ext with RD, Elbow at side +   Wrist Ext with UD, Elbow at side ++   Wrist Ext with RD, Elbow Ext NT   Wrist Ext with UD, Elbow Ext NT   Wrist Flex with RD, Elbow at side +   Wrist Flex with UD, Elbow at  side (-)   Wrist Flex with RD, Elbow Ext NT   Wrist Flex with UD, Elbow Ext NT   EDC with Elbow at side ++   EDC with Elbow Ext NT   Long Finger Test Tentatively positive     Neural Tension Testing  RNT: Radial Neurodynamic Test (based on DS Shakila's ULNT)   2/11/2023   0-5 Scale 5/5 S1   Position:   0/5: Arm across abdomen in coronal plane  1/5: Depress shoulder, ER to neutral ABD shoulder to 45 degrees  2/5: IR shoulder to end range, keep elbow at 90 degrees  3/5: Extend elbow to 0 degrees  4/5: Fully pronate forearm  5/5: Flex wrist and fingers with UD  Notes:    (+) indicates beyond grade level but less than residential to next level    (-) indicates over residential to level    S1  onset/change of patient's symptoms    S2 definite stop point based on patient's discomfort level    Strength   (Measured in pounds)  Pain Report: - none  + mild    ++ moderate    +++ severe    2/11/2023 2/11/2023   Trials L R   1  2  3 76 40 +   Average 76 40 +       2/11/2023 2/11/2023    L R   Elbow Ext 76 30 ++     Palpation  Pain Report:  - none    + mild    ++ moderate    +++ severe    2/11/2023 (R)   Triangular Interval NT   Infraspinatus NT   Teres Major NT   Pec Major NT   Pec Minor NT   Proximal Triceps NT   Spiral Groove NT   Distal Triceps NT   Anconeus NT   ECRB at LEP +   ECU at LEP +   EDC at LEP +   Radial Head (-)   Extensor Wad (-)   PIN Site (-)     Assessment:  Patient presents with symptoms consistent with diagnosis of right elbow pain, with conservative intervention.     Patient's limitations or Problem List includes:  Pain, Decreased ROM/motion, Weakness, Hypomobility and Decreased  of the right wrist, hand and forearm, which interferes with the patient's ability to perform Self Care Tasks (dressing), Work Tasks, Sleep Patterns, Recreational Activities, Household Chores and Driving  as compared to previous level of function.    Rehab Potential:  Excellent - Return to full activity, no limitations    Patient  will benefit from skilled Occupational Therapy to increase  strength, pinch strength and stability of wrist and decrease pain to return to previous activity level and resume normal daily tasks and to reach their rehab potential.    Barriers to Learning:  No barrier    Communication Issues:  Patient appears to be able to clearly communicate and understand verbal and written communication and follow directions correctly.    Chart Review: Chart Review and Brief history including review of medical and/or therapy records relating to the presenting problem    Identified Performance Deficits: functional mobility, driving and community mobility, health management and maintenance, home establishment and management, meal preparation and cleanup, shopping, work and leisure activities    Assessment of Occupational Performance:  1-3 Performance Deficits    Clinical Decision Making (Complexity): Low complexity    Treatment Explanation:  The following has been discussed with the patient:  RX ordered/plan of care  Anticipated outcomes  Possible risks and side effects    Plan:  Frequency:  1 X week, once daily  Duration:  for 8 weeks    Treatment Plan:    Modalities:    US   Therapeutic Exercise:    AROM, PROM, Tendon Gliding and Isometrics  Therapeutic Activities:  Functional activities   Ergonomics, activity modification  Neuromuscular re-ed:   Nerve Gliding, Posture, Kinesiotaping, Strain Counter Strain, Isometrics and Stabilization  Manual Techniques:   Joint mobilization and Myofascial release  Orthotic Fabrication:    Static  Self Care:    Self Care Tasks, Ergonomic Considerations and Work Tasks    Discharge Plan:  Achieve all LTG.  Independent in home treatment program.  Reach maximal therapeutic benefit.    Home Program:   Warmth for stiffness  Ice after activity for pain  PROM with stretch to wrist extensors and flexors  TFM to LEP  MFR to extensors, avoiding PIN site  Wrist cock up orthosis (fabricated today)  Avoid  activities that exacerbate pain in the elbow  Lift with elbows at side and forearms in neutral/supinated position    Next Visit:  Follow-up regarding orthosis fit and tolerance  Consider kinesiotape trial  Radial nerve glides PRN  Advanced extensor stretch  MFR  Additional activitty modification and joint protection education

## 2023-02-17 NOTE — PROGRESS NOTES
[facility]    OUTPATIENT Occupational Therapy ORTHOPEDIC EVALUATION  PLAN OF TREATMENT FOR OUTPATIENT REHABILITATION  (COMPLETE FOR INITIAL CLAIMS ONLY)  Patient's Last Name, First Name, M.I.  YOB: 1990  Gerri Rosado    Provider s Name:  [facility]   Medical Record No.  [id number]   Start of Care Date:  02/17/23   Onset Date:   01/24/23   Treatment Diagnosis:  R lateral epicondylitis Medical Diagnosis:     Lateral epicondylitis of right elbow  Right elbow pain       Goals:     02/17/23 0500   Goal #1   Goal #1 work   Previous Performance Level Independent   Current Functional Task Keyboard   Current Performance Level Moderately painful   STG Target Performance Repetitive tasks   STG Target Perform Level Minimally painful   Due Date 03/24/23   LTG Target Task/Performance No Difficulty   Due Date 04/14/23         Therapy Frequency:  1x/week  Predicted Duration of Therapy Intervention:  8 weeks    Moshe Teixeira OT                 I CERTIFY THE NEED FOR THESE SERVICES FURNISHED UNDER        THIS PLAN OF TREATMENT AND WHILE UNDER MY CARE     (Physician attestation of this document indicates review and certification of the therapy plan).                     Certification Date From:  02/17/23   Certification Date To:  04/14/23    Referring Provider:  Albert Yeo    Initial Assessment        See Epic Evaluation SOC Date: 02/17/23

## 2023-03-09 ENCOUNTER — MYC REFILL (OUTPATIENT)
Dept: INTERNAL MEDICINE | Facility: CLINIC | Age: 33
End: 2023-03-09
Payer: COMMERCIAL

## 2023-03-09 DIAGNOSIS — F90.0 ATTENTION DEFICIT HYPERACTIVITY DISORDER (ADHD), PREDOMINANTLY INATTENTIVE TYPE: Chronic | ICD-10-CM

## 2023-03-10 RX ORDER — DEXTROAMPHETAMINE SACCHARATE, AMPHETAMINE ASPARTATE, DEXTROAMPHETAMINE SULFATE AND AMPHETAMINE SULFATE 3.75; 3.75; 3.75; 3.75 MG/1; MG/1; MG/1; MG/1
15 TABLET ORAL 2 TIMES DAILY
Qty: 60 TABLET | Refills: 0 | Status: SHIPPED | OUTPATIENT
Start: 2023-03-10 | End: 2023-05-02

## 2023-03-10 NOTE — TELEPHONE ENCOUNTER
Pending Prescriptions:                       Disp   Refills    amphetamine-dextroamphetamine (ADDERALL) 1*60 tab*0        Sig: Take 1 tablet (15 mg) by mouth 2 times daily    Routing refill request to provider for review/approval because:  Drug not on the [facility] refill protocol

## 2023-03-14 ENCOUNTER — THERAPY VISIT (OUTPATIENT)
Dept: OCCUPATIONAL THERAPY | Facility: CLINIC | Age: 33
End: 2023-03-14
Payer: COMMERCIAL

## 2023-03-14 DIAGNOSIS — M25.521 RIGHT ELBOW PAIN: Primary | ICD-10-CM

## 2023-03-14 PROCEDURE — 97140 MANUAL THERAPY 1/> REGIONS: CPT | Mod: GO | Performed by: OCCUPATIONAL THERAPIST

## 2023-03-14 PROCEDURE — 97110 THERAPEUTIC EXERCISES: CPT | Mod: GO | Performed by: OCCUPATIONAL THERAPIST

## 2023-03-21 ENCOUNTER — THERAPY VISIT (OUTPATIENT)
Dept: OCCUPATIONAL THERAPY | Facility: CLINIC | Age: 33
End: 2023-03-21
Payer: COMMERCIAL

## 2023-03-21 DIAGNOSIS — M25.521 RIGHT ELBOW PAIN: Primary | ICD-10-CM

## 2023-03-21 PROCEDURE — 97110 THERAPEUTIC EXERCISES: CPT | Mod: GO | Performed by: OCCUPATIONAL THERAPIST

## 2023-03-21 PROCEDURE — 97140 MANUAL THERAPY 1/> REGIONS: CPT | Mod: GO | Performed by: OCCUPATIONAL THERAPIST

## 2023-04-11 ENCOUNTER — MYC REFILL (OUTPATIENT)
Dept: INTERNAL MEDICINE | Facility: CLINIC | Age: 33
End: 2023-04-11
Payer: COMMERCIAL

## 2023-04-11 DIAGNOSIS — F90.0 ATTENTION DEFICIT HYPERACTIVITY DISORDER (ADHD), PREDOMINANTLY INATTENTIVE TYPE: Chronic | ICD-10-CM

## 2023-04-13 NOTE — TELEPHONE ENCOUNTER
Amphetamine-dextroamphetamine (Adderall) 15 mg tab  Routing refill request to provider for review/approval because:  Drug not on the [facility] refill protocol     LOV 12/11/2022

## 2023-04-14 RX ORDER — DEXTROAMPHETAMINE SACCHARATE, AMPHETAMINE ASPARTATE, DEXTROAMPHETAMINE SULFATE AND AMPHETAMINE SULFATE 3.75; 3.75; 3.75; 3.75 MG/1; MG/1; MG/1; MG/1
15 TABLET ORAL 2 TIMES DAILY
Qty: 60 TABLET | Refills: 0 | OUTPATIENT
Start: 2023-04-14

## 2023-04-24 ENCOUNTER — MYC MEDICAL ADVICE (OUTPATIENT)
Dept: INTERNAL MEDICINE | Facility: CLINIC | Age: 33
End: 2023-04-24
Payer: COMMERCIAL

## 2023-04-24 DIAGNOSIS — F90.0 ATTENTION DEFICIT HYPERACTIVITY DISORDER (ADHD), PREDOMINANTLY INATTENTIVE TYPE: Chronic | ICD-10-CM

## 2023-05-02 RX ORDER — DEXTROAMPHETAMINE SACCHARATE, AMPHETAMINE ASPARTATE, DEXTROAMPHETAMINE SULFATE AND AMPHETAMINE SULFATE 3.75; 3.75; 3.75; 3.75 MG/1; MG/1; MG/1; MG/1
15 TABLET ORAL 2 TIMES DAILY
Qty: 60 TABLET | Refills: 0 | Status: SHIPPED | OUTPATIENT
Start: 2023-05-02 | End: 2023-06-09

## 2023-06-09 ENCOUNTER — VIRTUAL VISIT (OUTPATIENT)
Dept: INTERNAL MEDICINE | Facility: CLINIC | Age: 33
End: 2023-06-09
Payer: COMMERCIAL

## 2023-06-09 DIAGNOSIS — F32.A DEPRESSION, UNSPECIFIED DEPRESSION TYPE: ICD-10-CM

## 2023-06-09 DIAGNOSIS — F90.0 ATTENTION DEFICIT HYPERACTIVITY DISORDER (ADHD), PREDOMINANTLY INATTENTIVE TYPE: Chronic | ICD-10-CM

## 2023-06-09 PROCEDURE — 99213 OFFICE O/P EST LOW 20 MIN: CPT | Mod: VID | Performed by: INTERNAL MEDICINE

## 2023-06-09 RX ORDER — DEXTROAMPHETAMINE SACCHARATE, AMPHETAMINE ASPARTATE, DEXTROAMPHETAMINE SULFATE AND AMPHETAMINE SULFATE 3.75; 3.75; 3.75; 3.75 MG/1; MG/1; MG/1; MG/1
15 TABLET ORAL 2 TIMES DAILY
Qty: 60 TABLET | Refills: 0 | Status: SHIPPED | OUTPATIENT
Start: 2023-07-08 | End: 2023-12-26

## 2023-06-09 RX ORDER — DEXTROAMPHETAMINE SACCHARATE, AMPHETAMINE ASPARTATE, DEXTROAMPHETAMINE SULFATE AND AMPHETAMINE SULFATE 3.75; 3.75; 3.75; 3.75 MG/1; MG/1; MG/1; MG/1
15 TABLET ORAL 2 TIMES DAILY
Qty: 60 TABLET | Refills: 0 | Status: SHIPPED | OUTPATIENT
Start: 2023-06-09 | End: 2023-12-20

## 2023-06-09 RX ORDER — BUPROPION HYDROCHLORIDE 150 MG/1
150 TABLET ORAL EVERY MORNING
Qty: 90 TABLET | Refills: 1 | Status: SHIPPED | OUTPATIENT
Start: 2023-06-09 | End: 2023-07-05

## 2023-06-09 RX ORDER — DEXTROAMPHETAMINE SACCHARATE, AMPHETAMINE ASPARTATE, DEXTROAMPHETAMINE SULFATE AND AMPHETAMINE SULFATE 3.75; 3.75; 3.75; 3.75 MG/1; MG/1; MG/1; MG/1
15 TABLET ORAL 2 TIMES DAILY
Qty: 60 TABLET | Refills: 0 | Status: SHIPPED | OUTPATIENT
Start: 2023-08-07 | End: 2023-10-28

## 2023-06-09 ASSESSMENT — ANXIETY QUESTIONNAIRES
GAD7 TOTAL SCORE: 5
6. BECOMING EASILY ANNOYED OR IRRITABLE: SEVERAL DAYS
5. BEING SO RESTLESS THAT IT IS HARD TO SIT STILL: SEVERAL DAYS
1. FEELING NERVOUS, ANXIOUS, OR ON EDGE: SEVERAL DAYS
IF YOU CHECKED OFF ANY PROBLEMS ON THIS QUESTIONNAIRE, HOW DIFFICULT HAVE THESE PROBLEMS MADE IT FOR YOU TO DO YOUR WORK, TAKE CARE OF THINGS AT HOME, OR GET ALONG WITH OTHER PEOPLE: NOT DIFFICULT AT ALL
2. NOT BEING ABLE TO STOP OR CONTROL WORRYING: NOT AT ALL
GAD7 TOTAL SCORE: 5
4. TROUBLE RELAXING: SEVERAL DAYS
7. FEELING AFRAID AS IF SOMETHING AWFUL MIGHT HAPPEN: SEVERAL DAYS
8. IF YOU CHECKED OFF ANY PROBLEMS, HOW DIFFICULT HAVE THESE MADE IT FOR YOU TO DO YOUR WORK, TAKE CARE OF THINGS AT HOME, OR GET ALONG WITH OTHER PEOPLE?: NOT DIFFICULT AT ALL
GAD7 TOTAL SCORE: 5
7. FEELING AFRAID AS IF SOMETHING AWFUL MIGHT HAPPEN: SEVERAL DAYS
3. WORRYING TOO MUCH ABOUT DIFFERENT THINGS: NOT AT ALL

## 2023-06-09 ASSESSMENT — PATIENT HEALTH QUESTIONNAIRE - PHQ9
SUM OF ALL RESPONSES TO PHQ QUESTIONS 1-9: 6
10. IF YOU CHECKED OFF ANY PROBLEMS, HOW DIFFICULT HAVE THESE PROBLEMS MADE IT FOR YOU TO DO YOUR WORK, TAKE CARE OF THINGS AT HOME, OR GET ALONG WITH OTHER PEOPLE: SOMEWHAT DIFFICULT
SUM OF ALL RESPONSES TO PHQ QUESTIONS 1-9: 6

## 2023-06-09 NOTE — PROGRESS NOTES
Gerri is a 32 year old who is being evaluated via a billable video visit.      How would you like to obtain your AVS? Mail a copy  If the video visit is dropped, the invitation should be resent by: Text to cell phone: [phone]  Will anyone else be joining your video visit? No        This is a VIDEO ( using Doximity)  encounter with the patient.       Location of the provider : office   Location of the patient : home      07:33 --- 07:43             Dr Maravilla's note      Patient's instructions / PLAN:                                                        Plan:  1. Continue same meds, same doses for now   2. Schedule video appointment in Dec for follow up meds      ASSESSMENT & PLAN:                                                      (F32.A) Depression, unspecified depression type  Comment: Controlled    Plan: buPROPion (WELLBUTRIN XL) 150 MG 24 hr tablet            (F90.0) ADHD, inattentive type  Comment: stable   Plan: amphetamine-dextroamphetamine (ADDERALL) 15 MG         tablet, amphetamine-dextroamphetamine         (ADDERALL) 15 MG tablet,         amphetamine-dextroamphetamine (ADDERALL) 15 MG         tablet               Chief complaint:                                                      Follow up chronic medical problems      SUBJECTIVE:                                                    History of present illness:    Anxiety/depression  -- stable   -- takes wellbutrin 150. She doesn't she needs a higher dose  -- PHQ9 NELSON -- reviewed     ADHD  -- takes Adderall 15 mg twice a day 7d/w  -- doing well on this dose            Subjective   Gerri is a 32 year old, presenting for the following health issues:  Recheck Medication        6/9/2023     6:59 AM   Additional Questions   Roomed by Kandi Vargas     History of Present Illness       Reason for visit:  ADHD Meds    She eats 2-3 servings of fruits and vegetables daily.She consumes 0 sweetened beverage(s) daily.She exercises with enough effort to  increase her heart rate 9 or less minutes per day.  She exercises with enough effort to increase her heart rate 3 or less days per week.   She is taking medications regularly.    Today's PHQ-9         PHQ-9 Total Score: 6    PHQ-9 Q9 Thoughts of better off dead/self-harm past 2 weeks :   Not at all    How difficult have these problems made it for you to do your work, take care of things at home, or get along with other people: Somewhat difficult  Today's NELSON-7 Score: 5       Review of Systems:                                                      ROS: negative for fever, chills, cough, wheezes, chest pain, shortness of breath, vomiting, abdominal pain, leg swelling     OBJECTIVE:           An actual physical exam can't be done during phone visit   A limited exam can sometimes be performed by video visit   NAD      PMHx: reviewed  Past Medical History:   Diagnosis Date     Abnormal Pap smear of cervix 03/19/2007    see problem list     Anxiety      Cervical high risk HPV (human papillomavirus) test positive 03/19/2007    see problem list     Controlled substance agreement signed- reviewed-no concerns 2/22/19 3/18/2020     Pap smear for cervical cancer screening 2008    biopsy taken and ok      PSHx: reviewed  Past Surgical History:   Procedure Laterality Date     NO HISTORY OF SURGERY          Meds: reviewed  Current Outpatient Medications   Medication Sig Dispense Refill     amphetamine-dextroamphetamine (ADDERALL) 15 MG tablet Take 1 tablet (15 mg) by mouth 2 times daily 60 tablet 0     amphetamine-dextroamphetamine (ADDERALL) 15 MG tablet Take 1 tablet (15 mg) by mouth 2 times daily 60 tablet 0     amphetamine-dextroamphetamine (ADDERALL) 15 MG tablet Take 1 tablet (15 mg) by mouth 2 times daily 60 tablet 0     buPROPion (WELLBUTRIN XL) 150 MG 24 hr tablet Take 1 tablet (150 mg) by mouth every morning 30 tablet 3     buPROPion (WELLBUTRIN XL) 300 MG 24 hr tablet TAKE 1 TABLET BY MOUTH EVERY MORNING 90 tablet 1      diclofenac (VOLTAREN) 75 MG EC tablet Take 1 tablet (75 mg) by mouth 2 times daily as needed for moderate pain (4-6) 60 tablet 1     fish oil-omega-3 fatty acids 1000 MG capsule Take 2 g by mouth daily       levonorgestrel (MIRENA) 20 MCG/24HR IUD 1 each by Intrauterine route once       multivitamin, therapeutic (THERA-VIT) TABS tablet Take 1 tablet by mouth daily         Soc Hx: reviewed  Fam Hx: reviewed        Chart documentation was completed, in part, with Observe[employer] voice-recognition software. Even though reviewed, some grammatical, spelling, and word errors may remain.    Mary Ann Maravilla MD  Internal Medicine

## 2023-07-01 DIAGNOSIS — F32.A DEPRESSION, UNSPECIFIED DEPRESSION TYPE: ICD-10-CM

## 2023-07-04 NOTE — TELEPHONE ENCOUNTER
Pending Prescriptions:                       Disp   Refills    buPROPion (WELLBUTRIN XL) 150 MG 24 hr tab*30 tab*         Sig: TAKE 1 TABLET BY MOUTH EVERY MORNING    Routing refill request to provider for review/approval because:  PHQ-9 score:        6/9/2023     7:11 AM   PHQ   PHQ-9 Total Score 6   Q9: Thoughts of better off dead/self-harm past 2 weeks Not at all

## 2023-07-05 RX ORDER — BUPROPION HYDROCHLORIDE 150 MG/1
TABLET ORAL
Qty: 90 TABLET | Refills: 0 | Status: SHIPPED | OUTPATIENT
Start: 2023-07-05 | End: 2023-12-20

## 2023-07-06 NOTE — TELEPHONE ENCOUNTER
Pending Prescriptions:                       Disp   Refills    buPROPion (WELLBUTRIN XL) 150 MG 24 hr ta*30 tab*             Sig: TAKE 1 TABLET BY MOUTH EVERY MORNING    Patient is requesting remaining refills from 6/9/23 be sent to another pharmacy     Prescription approved per [facility] Refill Protocol.

## 2023-10-28 DIAGNOSIS — F90.0 ATTENTION DEFICIT HYPERACTIVITY DISORDER (ADHD), PREDOMINANTLY INATTENTIVE TYPE: Chronic | ICD-10-CM

## 2023-10-28 NOTE — TELEPHONE ENCOUNTER
Hi there,    Pt requested refill since 10/24 to refill adderall 15mg tabs. Please advise/review if appropriate    Thank you,  Minal Ash  [facility]

## 2023-11-01 RX ORDER — DEXTROAMPHETAMINE SACCHARATE, AMPHETAMINE ASPARTATE, DEXTROAMPHETAMINE SULFATE AND AMPHETAMINE SULFATE 3.75; 3.75; 3.75; 3.75 MG/1; MG/1; MG/1; MG/1
15 TABLET ORAL 2 TIMES DAILY
Qty: 60 TABLET | Refills: 0 | Status: SHIPPED | OUTPATIENT
Start: 2023-11-01 | End: 2023-12-15

## 2023-12-12 ENCOUNTER — TRANSFERRED RECORDS (OUTPATIENT)
Dept: HEALTH INFORMATION MANAGEMENT | Facility: CLINIC | Age: 33
End: 2023-12-12
Payer: COMMERCIAL

## 2023-12-15 DIAGNOSIS — F90.0 ATTENTION DEFICIT HYPERACTIVITY DISORDER (ADHD), PREDOMINANTLY INATTENTIVE TYPE: Chronic | ICD-10-CM

## 2023-12-15 RX ORDER — DEXTROAMPHETAMINE SACCHARATE, AMPHETAMINE ASPARTATE, DEXTROAMPHETAMINE SULFATE AND AMPHETAMINE SULFATE 3.75; 3.75; 3.75; 3.75 MG/1; MG/1; MG/1; MG/1
15 TABLET ORAL 2 TIMES DAILY
Qty: 10 TABLET | Refills: 0 | Status: SHIPPED | OUTPATIENT
Start: 2023-12-15 | End: 2024-06-04 | Stop reason: DRUGHIGH

## 2023-12-20 ENCOUNTER — VIRTUAL VISIT (OUTPATIENT)
Dept: INTERNAL MEDICINE | Facility: CLINIC | Age: 33
End: 2023-12-20
Payer: COMMERCIAL

## 2023-12-20 DIAGNOSIS — F90.0 ATTENTION DEFICIT HYPERACTIVITY DISORDER (ADHD), PREDOMINANTLY INATTENTIVE TYPE: Chronic | ICD-10-CM

## 2023-12-20 DIAGNOSIS — F32.A DEPRESSION, UNSPECIFIED DEPRESSION TYPE: ICD-10-CM

## 2023-12-20 PROCEDURE — 99213 OFFICE O/P EST LOW 20 MIN: CPT | Mod: VID | Performed by: INTERNAL MEDICINE

## 2023-12-20 RX ORDER — DEXTROAMPHETAMINE SACCHARATE, AMPHETAMINE ASPARTATE, DEXTROAMPHETAMINE SULFATE AND AMPHETAMINE SULFATE 3.75; 3.75; 3.75; 3.75 MG/1; MG/1; MG/1; MG/1
15 TABLET ORAL 2 TIMES DAILY
Qty: 60 TABLET | Refills: 0 | Status: SHIPPED | OUTPATIENT
Start: 2024-01-12 | End: 2023-12-21

## 2023-12-20 RX ORDER — BUPROPION HYDROCHLORIDE 150 MG/1
150 TABLET ORAL EVERY MORNING
Qty: 90 TABLET | Refills: 0 | Status: SHIPPED | OUTPATIENT
Start: 2023-12-20 | End: 2024-04-27

## 2023-12-20 ASSESSMENT — PATIENT HEALTH QUESTIONNAIRE - PHQ9
10. IF YOU CHECKED OFF ANY PROBLEMS, HOW DIFFICULT HAVE THESE PROBLEMS MADE IT FOR YOU TO DO YOUR WORK, TAKE CARE OF THINGS AT HOME, OR GET ALONG WITH OTHER PEOPLE: SOMEWHAT DIFFICULT
SUM OF ALL RESPONSES TO PHQ QUESTIONS 1-9: 8
SUM OF ALL RESPONSES TO PHQ QUESTIONS 1-9: 8

## 2023-12-20 NOTE — PATIENT INSTRUCTIONS
Plan:  You need an appointment every 6 months if you want to continue Adderall   -- schedule appointment in office June 2024  2. Stop Adderall and Wellbutrin if you get pregnant  3. Continue same meds, same doses for now   4. You need to sign the control substance letter before you can get more Adderall refills   5. Refill Adderall for Jan, 2024 - sent

## 2023-12-20 NOTE — PROGRESS NOTES
Gerri is a 33 year old who is being evaluated via a billable video visit.      How would you like to obtain your AVS? Mail a copy  If the video visit is dropped, the invitation should be resent by: Text to cell phone: [phone]  Will anyone else be joining your video visit? No            This is a VIDEO ( using Doximity)  encounter with the patient.       Location of the provider : office   Location of the patient : home      07:35 --- 07:53             Dr Maravilla's note      Patient's instructions / PLAN:                                                        Plan:  You need an appointment every 6 months if you want to continue Adderall   -- schedule appointment in office June 2024  2. Stop Adderall and Wellbutrin if you get pregnant  3. Continue same meds, same doses for now   4. You need to sign the control substance letter before you can get more Adderall refills   5. Refill Adderall for Jan, 2024 - sent          ASSESSMENT & PLAN:                                                      (F90.0) ADHD, inattentive type  Comment: Controlled    Plan: amphetamine-dextroamphetamine (ADDERALL) 15 MG         tablet            (F32.A) Depression, unspecified depression type  Comment: Controlled    Plan: buPROPion (WELLBUTRIN XL) 150 MG 24 hr tablet                 Chief complaint:                                                      Depression, ADHD    SUBJECTIVE:                                                    History of present illness:    Overall she feels she is doing well and she is not looking to change the doses     She wants ADDERALL refills   -- she feels more tired.     Depression  -- stable on Wellbutrin 150 mg.     She had annual exam with OBGYN in March 2023      Review of Systems:                                                      ROS: negative for fever, chills, cough, wheezes, chest pain, shortness of breath, vomiting, abdominal pain, leg swelling       OBJECTIVE:           An actual physical exam  can't be done during phone visit   A limited exam can sometimes be performed by video visit   NAD        PMHx: reviewed  Past Medical History:   Diagnosis Date    Abnormal Pap smear of cervix 03/19/2007    see problem list    Anxiety     Cervical high risk HPV (human papillomavirus) test positive 03/19/2007    see problem list    Controlled substance agreement signed- reviewed-no concerns 2/22/19 3/18/2020    Pap smear for cervical cancer screening 2008    biopsy taken and ok      PSHx: reviewed  Past Surgical History:   Procedure Laterality Date    NO HISTORY OF SURGERY          Meds: reviewed  Current Outpatient Medications   Medication Sig Dispense Refill    amphetamine-dextroamphetamine (ADDERALL) 15 MG tablet Take 1 tablet (15 mg) by mouth 2 times daily 10 tablet 0    amphetamine-dextroamphetamine (ADDERALL) 15 MG tablet Take 1 tablet (15 mg) by mouth 2 times daily 60 tablet 0    amphetamine-dextroamphetamine (ADDERALL) 15 MG tablet Take 1 tablet (15 mg) by mouth 2 times daily 60 tablet 0    buPROPion (WELLBUTRIN XL) 150 MG 24 hr tablet TAKE 1 TABLET BY MOUTH EVERY MORNING 90 tablet 0    diclofenac (VOLTAREN) 75 MG EC tablet Take 1 tablet (75 mg) by mouth 2 times daily as needed for moderate pain (4-6) 60 tablet 1    fish oil-omega-3 fatty acids 1000 MG capsule Take 2 g by mouth daily      levonorgestrel (MIRENA) 20 MCG/24HR IUD 1 each by Intrauterine route once      multivitamin, therapeutic (THERA-VIT) TABS tablet Take 1 tablet by mouth daily         Soc Hx: reviewed  Fam Hx: reviewed        Chart documentation was completed, in part, with [employer] voice-recognition software. Even though reviewed, some grammatical, spelling, and word errors may remain.    Mary Ann Maravilla MD  Internal Medicine

## 2023-12-20 NOTE — LETTER
[facility]  12/20/23  Patient: Gerri Rosado  YOB: 1990  Medical Record Number: [id number]                                                                                  Non-Opioid Controlled Substance Agreement    This is an agreement between you and your provider regarding safe and appropriate use of controlled substances prescribed by your care team. Controlled substances are?medicines that can cause physical and mental dependence (abuse).     There are strict laws about having and using these medicines. We here at [facility] are  committed to working with you in your efforts to get better. To support you in this work, we'll help you schedule regular office appointments for medicine refills. If we must cancel or change your appointment for any reason, we'll make sure you have enough medicine to last until your next appointment.     As a Provider, I will:   Listen carefully to your concerns while treating you with respect.   Recommend a treatment plan that I believe is in your best interest and may involve therapies other than medicine.    Talk with you often about the possible benefits and the risk of harm of any medicine that we prescribe for you.  Assess the safety of this medicine and check how well it works.    Provide a plan on how to taper (discontinue or go off) using this medicine if the decision is made to stop its use.      ::  As a Patient, I understand controlled substances:     Are prescribed by my care provider to help me function or work and manage my condition(s).?  Are strong medicines and can cause serious side effects.     Need to be taken exactly as prescribed.?Combining controlled substances with certain medicines or chemicals (such as illegal drugs, alcohol, sedatives, sleeping pills, and benzodiazepines) can be dangerous or even fatal.? If I stop taking my medicines suddenly, I may have severe withdrawal symptoms.     The risks, benefits, and  side effects of these medicine(s) were explained to me. I agree that:    I will take part in other treatments as advised by my care team. This may be psychiatry or counseling, physical therapy, behavioral therapy, group treatment or a referral to specialist.    I will keep all my appointments and understand this is part of the monitoring of controlled substances.?My care team may require an office visit for EVERY controlled substance refill. If I miss appointments or don t follow instructions, my care team may stop my medicine    I will take my medicines as prescribed. I will not change the dose or schedule unless my care team tells me to. There will be no refills if I run out early.      I may be asked to come to the clinic and complete a urine drug test or complete a pill count. If I don t give a urine sample or participate in a pill count, the care team may stop my medicine.    I will only receive controlled substance prescriptions from this clinic. If I am treated by another provider, I will tell them that I am taking controlled substances and that I have a treatment agreement with this provider. I will inform my [facility] care team within one business day if I am given a prescription for any controlled substance by another healthcare provider. My [facility] care team can contact other providers and pharmacists about my use of any medicines.    It is up to me to make sure that I don't run out of my medicines on weekends or holidays.?If my care team is willing to refill my prescription without a visit, I must request refills only during office hours. Refills may take up to 3 business days to process. I will use one pharmacy to fill all my controlled substance prescriptions. I will notify the clinic about any changes to my insurance or medicine availability.    I am responsible for my prescriptions. If the medicine/prescription is lost, stolen or destroyed, it will not be replaced.?I also agree not  to share controlled substance medicines with anyone.     I am aware I should not use any illegal or recreational drugs. I agree not to drink alcohol unless my care team says I can.     If I enroll in the [place] Medical Cannabis program, I will tell my care team before my next refill.    I will tell my care team right away if I become pregnant, have a new medical problem treated outside of my regular clinic, or have a change in my medicines.     I understand that this medicine can affect my thinking, judgment and reaction time.? Alcohol and drugs affect the brain and body, which can affect the safety of my driving. Being under the influence of alcohol or drugs can affect my decision-making, behaviors, personal safety and the safety of others. Driving while impaired (DWI) can occur if a person is driving, operating or in physical control of a car, motorcycle, boat, snowmobile, ATV, motorbike, off-road vehicle or any other motor vehicle ([place] Statute 169A.20). I understand the risk if I choose to drive or operate any vehicle or machinery.    I understand that if I do not follow any of the conditions above, my prescriptions or treatment may be stopped or changed.   I agree that my provider, clinic care team and pharmacy may work with any city, state or federal law enforcement agency that investigates the misuse, sale or other diversion of my controlled medicine. I will allow my provider to discuss my care with, or share a copy of, this agreement with any other treating provider, pharmacy or emergency room where I receive care.     I have read this agreement and have asked questions about anything I did not understand.    ________________________________________________________  Patient Signature - Gerri Rosado     ___________________                   Date     ________________________________________________________  Provider Signature - Mary Ann Aguilera MD       ___________________                    Date     ________________________________________________________  Witness Signature (required if provider not present while patient signing)          ___________________                   Date

## 2023-12-21 DIAGNOSIS — F90.0 ATTENTION DEFICIT HYPERACTIVITY DISORDER (ADHD), PREDOMINANTLY INATTENTIVE TYPE: Chronic | ICD-10-CM

## 2023-12-21 RX ORDER — DEXTROAMPHETAMINE SACCHARATE, AMPHETAMINE ASPARTATE, DEXTROAMPHETAMINE SULFATE AND AMPHETAMINE SULFATE 3.75; 3.75; 3.75; 3.75 MG/1; MG/1; MG/1; MG/1
15 TABLET ORAL 2 TIMES DAILY
Qty: 60 TABLET | Refills: 0 | Status: SHIPPED | OUTPATIENT
Start: 2023-12-22 | End: 2024-02-21

## 2023-12-21 RX ORDER — DEXTROAMPHETAMINE SACCHARATE, AMPHETAMINE ASPARTATE, DEXTROAMPHETAMINE SULFATE AND AMPHETAMINE SULFATE 3.75; 3.75; 3.75; 3.75 MG/1; MG/1; MG/1; MG/1
15 TABLET ORAL 2 TIMES DAILY
Qty: 60 TABLET | Refills: 0 | Status: CANCELLED | OUTPATIENT
Start: 2024-01-12

## 2023-12-21 NOTE — TELEPHONE ENCOUNTER
Pt seen yesterday and refill for Adderall sent. However, refill has start date of 1/12/24. The 12/15/23 Rx was for a 5 day supply only. Please resend with new start date. Med pended for review and signature.     Vishal Merchant RN [facility]

## 2024-02-01 PROBLEM — Z79.899 CONTROLLED SUBSTANCE AGREEMENT SIGNED: Status: ACTIVE | Noted: 2020-03-18

## 2024-02-19 ENCOUNTER — MYC REFILL (OUTPATIENT)
Dept: INTERNAL MEDICINE | Facility: CLINIC | Age: 34
End: 2024-02-19
Payer: COMMERCIAL

## 2024-02-19 DIAGNOSIS — F90.0 ATTENTION DEFICIT HYPERACTIVITY DISORDER (ADHD), PREDOMINANTLY INATTENTIVE TYPE: Chronic | ICD-10-CM

## 2024-02-19 RX ORDER — DEXTROAMPHETAMINE SACCHARATE, AMPHETAMINE ASPARTATE, DEXTROAMPHETAMINE SULFATE AND AMPHETAMINE SULFATE 3.75; 3.75; 3.75; 3.75 MG/1; MG/1; MG/1; MG/1
15 TABLET ORAL 2 TIMES DAILY
Qty: 60 TABLET | Refills: 0 | Status: CANCELLED | OUTPATIENT
Start: 2024-02-19

## 2024-02-21 RX ORDER — DEXTROAMPHETAMINE SACCHARATE, AMPHETAMINE ASPARTATE, DEXTROAMPHETAMINE SULFATE AND AMPHETAMINE SULFATE 3.75; 3.75; 3.75; 3.75 MG/1; MG/1; MG/1; MG/1
15 TABLET ORAL 2 TIMES DAILY
Qty: 60 TABLET | Refills: 0 | Status: SHIPPED | OUTPATIENT
Start: 2024-02-21 | End: 2024-04-27

## 2024-02-21 RX ORDER — DEXTROAMPHETAMINE SACCHARATE, AMPHETAMINE ASPARTATE, DEXTROAMPHETAMINE SULFATE AND AMPHETAMINE SULFATE 3.75; 3.75; 3.75; 3.75 MG/1; MG/1; MG/1; MG/1
15 TABLET ORAL 2 TIMES DAILY
Qty: 60 TABLET | Refills: 0 | Status: SHIPPED | OUTPATIENT
Start: 2024-03-21 | End: 2024-06-04

## 2024-02-21 RX ORDER — DEXTROAMPHETAMINE SACCHARATE, AMPHETAMINE ASPARTATE, DEXTROAMPHETAMINE SULFATE AND AMPHETAMINE SULFATE 3.75; 3.75; 3.75; 3.75 MG/1; MG/1; MG/1; MG/1
15 TABLET ORAL 2 TIMES DAILY
Qty: 60 TABLET | Refills: 0 | Status: SHIPPED | OUTPATIENT
Start: 2024-04-19 | End: 2024-06-04 | Stop reason: DRUGHIGH

## 2024-02-27 ENCOUNTER — PATIENT OUTREACH (OUTPATIENT)
Dept: CARE COORDINATION | Facility: CLINIC | Age: 34
End: 2024-02-27
Payer: COMMERCIAL

## 2024-03-12 ENCOUNTER — PATIENT OUTREACH (OUTPATIENT)
Dept: CARE COORDINATION | Facility: CLINIC | Age: 34
End: 2024-03-12

## 2024-04-27 ENCOUNTER — MYC REFILL (OUTPATIENT)
Dept: INTERNAL MEDICINE | Facility: CLINIC | Age: 34
End: 2024-04-27

## 2024-04-27 DIAGNOSIS — F32.A DEPRESSION, UNSPECIFIED DEPRESSION TYPE: ICD-10-CM

## 2024-04-27 DIAGNOSIS — F90.0 ATTENTION DEFICIT HYPERACTIVITY DISORDER (ADHD), PREDOMINANTLY INATTENTIVE TYPE: Chronic | ICD-10-CM

## 2024-04-29 ENCOUNTER — MYC REFILL (OUTPATIENT)
Dept: INTERNAL MEDICINE | Facility: CLINIC | Age: 34
End: 2024-04-29

## 2024-04-29 DIAGNOSIS — F90.0 ATTENTION DEFICIT HYPERACTIVITY DISORDER (ADHD), PREDOMINANTLY INATTENTIVE TYPE: Chronic | ICD-10-CM

## 2024-04-29 RX ORDER — DEXTROAMPHETAMINE SACCHARATE, AMPHETAMINE ASPARTATE, DEXTROAMPHETAMINE SULFATE AND AMPHETAMINE SULFATE 3.75; 3.75; 3.75; 3.75 MG/1; MG/1; MG/1; MG/1
15 TABLET ORAL 2 TIMES DAILY
Qty: 60 TABLET | Refills: 0 | Status: SHIPPED | OUTPATIENT
Start: 2024-04-29 | End: 2024-04-29

## 2024-04-29 RX ORDER — BUPROPION HYDROCHLORIDE 150 MG/1
150 TABLET ORAL EVERY MORNING
Qty: 90 TABLET | Refills: 0 | Status: SHIPPED | OUTPATIENT
Start: 2024-04-29

## 2024-05-01 RX ORDER — DEXTROAMPHETAMINE SACCHARATE, AMPHETAMINE ASPARTATE, DEXTROAMPHETAMINE SULFATE AND AMPHETAMINE SULFATE 3.75; 3.75; 3.75; 3.75 MG/1; MG/1; MG/1; MG/1
15 TABLET ORAL 2 TIMES DAILY
Qty: 60 TABLET | Refills: 0 | Status: SHIPPED | OUTPATIENT
Start: 2024-05-01 | End: 2024-06-04 | Stop reason: DRUGHIGH

## 2024-05-26 ENCOUNTER — HEALTH MAINTENANCE LETTER (OUTPATIENT)
Age: 34
End: 2024-05-26

## 2024-06-04 ENCOUNTER — OFFICE VISIT (OUTPATIENT)
Dept: INTERNAL MEDICINE | Facility: CLINIC | Age: 34
End: 2024-06-04
Payer: COMMERCIAL

## 2024-06-04 VITALS
OXYGEN SATURATION: 97 % | DIASTOLIC BLOOD PRESSURE: 66 MMHG | RESPIRATION RATE: 18 BRPM | BODY MASS INDEX: 22.51 KG/M2 | SYSTOLIC BLOOD PRESSURE: 97 MMHG | TEMPERATURE: 97 F | HEIGHT: 67 IN | HEART RATE: 94 BPM | WEIGHT: 143.4 LBS

## 2024-06-04 DIAGNOSIS — F90.0 ATTENTION DEFICIT HYPERACTIVITY DISORDER (ADHD), PREDOMINANTLY INATTENTIVE TYPE: Primary | Chronic | ICD-10-CM

## 2024-06-04 DIAGNOSIS — F32.A DEPRESSION, UNSPECIFIED DEPRESSION TYPE: ICD-10-CM

## 2024-06-04 PROCEDURE — 80307 DRUG TEST PRSMV CHEM ANLYZR: CPT | Performed by: INTERNAL MEDICINE

## 2024-06-04 PROCEDURE — 99214 OFFICE O/P EST MOD 30 MIN: CPT | Performed by: INTERNAL MEDICINE

## 2024-06-04 RX ORDER — BUPROPION HYDROCHLORIDE 75 MG/1
75 TABLET ORAL DAILY
Qty: 30 TABLET | Refills: 1 | Status: SHIPPED | OUTPATIENT
Start: 2024-06-04

## 2024-06-04 RX ORDER — DEXTROAMPHETAMINE SACCHARATE, AMPHETAMINE ASPARTATE, DEXTROAMPHETAMINE SULFATE AND AMPHETAMINE SULFATE 3.75; 3.75; 3.75; 3.75 MG/1; MG/1; MG/1; MG/1
TABLET ORAL
Qty: 30 TABLET | Refills: 0 | Status: SHIPPED | OUTPATIENT
Start: 2024-06-04 | End: 2024-07-02

## 2024-06-04 RX ORDER — DEXTROAMPHETAMINE SACCHARATE, AMPHETAMINE ASPARTATE, DEXTROAMPHETAMINE SULFATE AND AMPHETAMINE SULFATE 5; 5; 5; 5 MG/1; MG/1; MG/1; MG/1
TABLET ORAL
Qty: 30 TABLET | Refills: 0 | Status: SHIPPED | OUTPATIENT
Start: 2024-06-04 | End: 2024-07-02

## 2024-06-04 ASSESSMENT — ANXIETY QUESTIONNAIRES
3. WORRYING TOO MUCH ABOUT DIFFERENT THINGS: NOT AT ALL
4. TROUBLE RELAXING: NOT AT ALL
2. NOT BEING ABLE TO STOP OR CONTROL WORRYING: NOT AT ALL
6. BECOMING EASILY ANNOYED OR IRRITABLE: SEVERAL DAYS
7. FEELING AFRAID AS IF SOMETHING AWFUL MIGHT HAPPEN: NOT AT ALL
GAD7 TOTAL SCORE: 3
GAD7 TOTAL SCORE: 3
5. BEING SO RESTLESS THAT IT IS HARD TO SIT STILL: MORE THAN HALF THE DAYS
1. FEELING NERVOUS, ANXIOUS, OR ON EDGE: NOT AT ALL
IF YOU CHECKED OFF ANY PROBLEMS ON THIS QUESTIONNAIRE, HOW DIFFICULT HAVE THESE PROBLEMS MADE IT FOR YOU TO DO YOUR WORK, TAKE CARE OF THINGS AT HOME, OR GET ALONG WITH OTHER PEOPLE: SOMEWHAT DIFFICULT

## 2024-06-04 ASSESSMENT — PAIN SCALES - GENERAL: PAINLEVEL: NO PAIN (0)

## 2024-06-04 ASSESSMENT — PATIENT HEALTH QUESTIONNAIRE - PHQ9: SUM OF ALL RESPONSES TO PHQ QUESTIONS 1-9: 6

## 2024-06-04 NOTE — PATIENT INSTRUCTIONS
Plan:  Decrease Wellbutrin to 75 mg daily for 2-4 weeks then stop it  2. Increase Adderall Take 20 mg in the morning and 15 mg in the afternoon  3. Schedule virtual follow up appointment July 2  4. Urine test today

## 2024-06-04 NOTE — PROGRESS NOTES
"Dr Maravilla's note      Patient's instructions / PLAN:                                                        Plan:  Decrease Wellbutrin to 75 mg daily for 2-4 weeks then stop it  2. Increase Adderall Take 20 mg in the morning and 15 mg in the afternoon  3. Schedule virtual follow up appointment July 2 4. Urine test today       ASSESSMENT & PLAN:                                                      (F90.0) ADHD, inattentive type  (primary encounter diagnosis)  Comment: possible not controlled with present dose  Plan: amphetamine-dextroamphetamine (ADDERALL) 15 MG         tablet, amphetamine-dextroamphetamine         (ADDERALL) 20 MG tablet, Urine Drug Screen            (F32.A) Depression, unspecified depression type  Comment: Controlled    Plan: buPROPion (WELLBUTRIN) 75 MG tablet        as above          Chief complaint:                                                      Follow up chronic medical problems        SUBJECTIVE:                                                    History of present illness:    Depression  -- doing well  -- she would like to stop Wellbutrin     ADHD  -- she thinks she needs higher dose  -- am tab wears off by 11 and pm one wears off by 2-3 pm  -- she has tried XR and she hasn't founded helpful    Subjective   Gerri is a 33 year old, presenting for the following health issues:  Patient is being seen for medication refills.      6/4/2024     8:10 AM   Additional Questions   Roomed by Kandi Vargas       Review of Systems:                                                      ROS: negative for fever, chills, cough, wheezes, chest pain, shortness of breath, vomiting, abdominal pain, leg swelling     OBJECTIVE:             Physical exam:  Blood pressure 97/66, pulse 94, temperature 97  F (36.1  C), temperature source Tympanic, resp. rate 18, height 1.702 m (5' 7\"), weight 65 kg (143 lb 6.4 oz), SpO2 97%, not currently breastfeeding.     NAD, appears comfortable  Neurologic: A, Ox3, no focal " signs appreciated    PMHx: reviewed  Past Medical History:   Diagnosis Date    Abnormal Pap smear of cervix 03/19/2007    see problem list    Anxiety     Cervical high risk HPV (human papillomavirus) test positive 03/19/2007    see problem list    Controlled substance agreement signed- reviewed-no concerns 2/22/19 3/18/2020    Pap smear for cervical cancer screening 2008    biopsy taken and ok      PSHx: reviewed  Past Surgical History:   Procedure Laterality Date    NO HISTORY OF SURGERY          Meds: reviewed  Current Outpatient Medications   Medication Sig Dispense Refill    amphetamine-dextroamphetamine (ADDERALL) 15 MG tablet Take 1 tablet (15 mg) by mouth 2 times daily 60 tablet 0    amphetamine-dextroamphetamine (ADDERALL) 15 MG tablet Take 1 tablet (15 mg) by mouth 2 times daily 60 tablet 0    amphetamine-dextroamphetamine (ADDERALL) 15 MG tablet Take 1 tablet (15 mg) by mouth 2 times daily 60 tablet 0    amphetamine-dextroamphetamine (ADDERALL) 15 MG tablet Take 1 tablet (15 mg) by mouth 2 times daily 10 tablet 0    buPROPion (WELLBUTRIN XL) 150 MG 24 hr tablet Take 1 tablet (150 mg) by mouth every morning 90 tablet 0    fish oil-omega-3 fatty acids 1000 MG capsule Take 2 g by mouth daily      levonorgestrel (MIRENA) 20 MCG/24HR IUD 1 each by Intrauterine route once      multivitamin, therapeutic (THERA-VIT) TABS tablet Take 1 tablet by mouth daily      diclofenac (VOLTAREN) 75 MG EC tablet Take 1 tablet (75 mg) by mouth 2 times daily as needed for moderate pain (4-6) (Patient not taking: Reported on 6/4/2024) 60 tablet 1       Soc Hx: reviewed  Fam Hx: reviewed      Chart documentation was completed, in part, with [employer] voice-recognition software. Even though reviewed, some grammatical, spelling, and word errors may remain.      Mary Ann Maravilla MD  Internal Medicine       Signed Electronically by: Mary Ann Aguilera MD

## 2024-06-05 LAB
AMPHETAMINES UR QL SCN: NORMAL
BARBITURATES UR QL SCN: NORMAL
BENZODIAZ UR QL SCN: NORMAL
BZE UR QL SCN: NORMAL
CANNABINOIDS UR QL SCN: NORMAL
FENTANYL UR QL: NORMAL
OPIATES UR QL SCN: NORMAL
PCP QUAL URINE (ROCHE): NORMAL

## 2024-07-02 ENCOUNTER — VIRTUAL VISIT (OUTPATIENT)
Dept: INTERNAL MEDICINE | Facility: CLINIC | Age: 34
End: 2024-07-02
Payer: COMMERCIAL

## 2024-07-02 DIAGNOSIS — F32.A DEPRESSION, UNSPECIFIED DEPRESSION TYPE: Primary | ICD-10-CM

## 2024-07-02 DIAGNOSIS — F90.0 ATTENTION DEFICIT HYPERACTIVITY DISORDER (ADHD), PREDOMINANTLY INATTENTIVE TYPE: ICD-10-CM

## 2024-07-02 DIAGNOSIS — F90.0 ATTENTION DEFICIT HYPERACTIVITY DISORDER (ADHD), PREDOMINANTLY INATTENTIVE TYPE: Chronic | ICD-10-CM

## 2024-07-02 PROCEDURE — 99214 OFFICE O/P EST MOD 30 MIN: CPT | Mod: 95 | Performed by: INTERNAL MEDICINE

## 2024-07-02 RX ORDER — DEXTROAMPHETAMINE SACCHARATE, AMPHETAMINE ASPARTATE, DEXTROAMPHETAMINE SULFATE AND AMPHETAMINE SULFATE 5; 5; 5; 5 MG/1; MG/1; MG/1; MG/1
TABLET ORAL
Qty: 30 TABLET | Refills: 0 | Status: SHIPPED | OUTPATIENT
Start: 2024-07-02

## 2024-07-02 RX ORDER — DEXTROAMPHETAMINE SACCHARATE, AMPHETAMINE ASPARTATE, DEXTROAMPHETAMINE SULFATE AND AMPHETAMINE SULFATE 3.75; 3.75; 3.75; 3.75 MG/1; MG/1; MG/1; MG/1
TABLET ORAL
Qty: 30 TABLET | Refills: 0 | Status: SHIPPED | OUTPATIENT
Start: 2024-09-01

## 2024-07-02 RX ORDER — DEXTROAMPHETAMINE SACCHARATE, AMPHETAMINE ASPARTATE, DEXTROAMPHETAMINE SULFATE AND AMPHETAMINE SULFATE 5; 5; 5; 5 MG/1; MG/1; MG/1; MG/1
20 TABLET ORAL 2 TIMES DAILY
Qty: 30 TABLET | Refills: 0 | Status: SHIPPED | OUTPATIENT
Start: 2024-09-01

## 2024-07-02 RX ORDER — DEXTROAMPHETAMINE SACCHARATE, AMPHETAMINE ASPARTATE, DEXTROAMPHETAMINE SULFATE AND AMPHETAMINE SULFATE 3.75; 3.75; 3.75; 3.75 MG/1; MG/1; MG/1; MG/1
TABLET ORAL
Qty: 30 TABLET | Refills: 0 | Status: SHIPPED | OUTPATIENT
Start: 2024-07-02

## 2024-07-02 RX ORDER — DEXTROAMPHETAMINE SACCHARATE, AMPHETAMINE ASPARTATE, DEXTROAMPHETAMINE SULFATE AND AMPHETAMINE SULFATE 5; 5; 5; 5 MG/1; MG/1; MG/1; MG/1
TABLET ORAL
Qty: 30 TABLET | Refills: 0 | Status: SHIPPED | OUTPATIENT
Start: 2024-08-01

## 2024-07-02 RX ORDER — DEXTROAMPHETAMINE SACCHARATE, AMPHETAMINE ASPARTATE, DEXTROAMPHETAMINE SULFATE AND AMPHETAMINE SULFATE 3.75; 3.75; 3.75; 3.75 MG/1; MG/1; MG/1; MG/1
TABLET ORAL
Qty: 30 TABLET | Refills: 0 | Status: SHIPPED | OUTPATIENT
Start: 2024-08-01

## 2024-07-02 NOTE — PROGRESS NOTES
Gerri is a 33 year old who is being evaluated via a billable video visit.    How would you like to obtain your AVS? Mail a copy  If the video visit is dropped, the invitation should be resent by: Text to cell phone: [phone]  Will anyone else be joining your video visit? No        This is a VIDEO ( using Doximity)  encounter with the patient.       Location of the provider : office   Location of the patient : home      17:37 --- 18:03           Dr Maravilla's note      Patient's instructions / PLAN:                                                        Plan:  Adderall 3 month refills  2. Psychiatrist referral   3. 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), predominantly inattentive type  Comment:   Plan: Adult Mental Health  Referral            (F90.0) ADHD, inattentive type  Comment:   Plan: amphetamine-dextroamphetamine (ADDERALL) 20 MG         tablet, amphetamine-dextroamphetamine         (ADDERALL) 15 MG tablet,         amphetamine-dextroamphetamine (ADDERALL) 15 MG         tablet, amphetamine-dextroamphetamine         (ADDERALL) 20 MG tablet,         amphetamine-dextroamphetamine (ADDERALL) 15 MG         tablet, amphetamine-dextroamphetamine         (ADDERALL) 20 MG tablet                 Chief complaint:                                                      MDD  ADHD    SUBJECTIVE:                                                    History of present illness:      Since stopping she feels frustrated with everything. She is trying a balance with no meds because she is trying to get pregnant in the fall.    ADHD  -- takes 20 mg in pm and 15 mg in am and it helps   -- neg urine test because she came from  vacation     LOV: June 4, 2024 Plan:  Decrease Wellbutrin to 75 mg daily for 2-4 weeks then stop it  2. Increase Adderall Take 20 mg in the morning and 15 mg in the afternoon  3. Schedule virtual follow up appointment July 2 4. Urine test today        Santiago Talley is a 33 year old, presenting for the following health issues:  Recheck Medication      7/2/2024    11:00 AM   Additional Questions   Roomed by Kandi Vargas     Objective    Vitals - Patient Reported  Pain Score: No Pain (0)        Review of Systems:                                                      ROS: negative for fever, chills, cough, wheezes, chest pain, shortness of breath, vomiting, abdominal pain, leg swelling     OBJECTIVE:           An actual physical exam can't be done during phone visit   A limited exam can sometimes be performed by video visit   NAD      PMHx: reviewed  Past Medical History:   Diagnosis Date    Abnormal Pap smear of cervix 03/19/2007    see problem list    Anxiety     Cervical high risk HPV (human papillomavirus) test positive 03/19/2007    see problem list    Controlled substance agreement signed- reviewed-no concerns 2/22/19 3/18/2020    Pap smear for cervical cancer screening 2008    biopsy taken and ok      PSHx: reviewed  Past Surgical History:   Procedure Laterality Date    NO HISTORY OF SURGERY          Meds: reviewed  Current Outpatient Medications   Medication Sig Dispense Refill    amphetamine-dextroamphetamine (ADDERALL) 15 MG tablet Take 20 mg in the morning and 15 mg in the afternoon 30 tablet 0    amphetamine-dextroamphetamine (ADDERALL) 20 MG tablet Take 20 mg in the morning and 15 mg in the afternoon 30 tablet 0    buPROPion (WELLBUTRIN XL) 150 MG 24 hr tablet Take 1 tablet (150 mg) by mouth every morning 90 tablet 0    buPROPion (WELLBUTRIN) 75 MG tablet Take 1 tablet (75 mg) by mouth daily 30 tablet 1    fish oil-omega-3 fatty acids 1000 MG capsule Take 2 g by mouth daily      levonorgestrel  (MIRENA) 20 MCG/24HR IUD 1 each by Intrauterine route once      multivitamin, therapeutic (THERA-VIT) TABS tablet Take 1 tablet by mouth daily         Soc Hx: reviewed  Fam Hx: reviewed        Chart documentation was completed, in part, with Qazzow voice-recognition software. Even though reviewed, some grammatical, spelling, and word errors may remain.    Mary Ann Maravilla MD  Internal Medicine     Signed Electronically by: Mary Ann Aguilera MD

## 2024-09-16 NOTE — PROGRESS NOTES
CHIEF COMPLAINT:  Pain of the Right Hip     HISTORY OF PRESENT ILLNESS  Ms. Rosado is a pleasant 34 year old year old female who presents to clinic today with right hip pain.  Gerri explains that she has numbness on the back of her hip extending into her knee region.  Does not pass beyond her knee.  She states that she initially experienced pain of her low back and of her right shin after running for exercise.  She did not think much of it but then began to experience increased. She has a history of sciatica that she has treated about 7 years ago with conservative cares.    She notes at that time with her sciatic symptoms 7 years ago, it felt like a sharp stabbing pain down her right leg.  She notes that this feels much different given that numbness is the predominant symptom and has only occasional dull pain.    Denies any weakness.  Has not experienced any significant back pain at this time, only at the onset of this episode.    Onset: gradual  Location: right hip  Quality:  dull  Duration: 2 weeks   Severity: 3/10 at worst  Timing:intermittent episodes random  Modifying factors: rest does not seem to help, movement causes some soreness/pain  Associated signs & symptoms: pain and numbness/tingling  Previous similar pain: Yes, sciatica in the past. Numbness of upper extremity. Flexible joints.   Treatments to date: Stretching    Additional history: as documented    Review of Systems:  A 10-point review of systems was obtained and is negative except for as noted in the HPI.     MEDICAL HISTORY  Patient Active Problem List   Diagnosis    CARDIOVASCULAR SCREENING; LDL GOAL LESS THAN 160    Anxiety    Concentration deficit    ADHD, inattentive type    IUD (intrauterine device) in place    Controlled substance agreement broken    Depression, unspecified depression type    Controlled substance agreement signed- reviewed-no concerns 2/22/19    Right elbow pain       Current Outpatient Medications   Medication Sig  "Dispense Refill    amphetamine-dextroamphetamine (ADDERALL) 15 MG tablet Take 15 mg in the morning and 20 mg in the afternoon 30 tablet 0    amphetamine-dextroamphetamine (ADDERALL) 15 MG tablet Take 15 mg in the morning and 20 mg in the afternoon 30 tablet 0    amphetamine-dextroamphetamine (ADDERALL) 15 MG tablet Take 15 mg in the morning and 20 mg in the afternoon 30 tablet 0    amphetamine-dextroamphetamine (ADDERALL) 20 MG tablet Take 15 mg in the morning and 20 mg in the afternoon 30 tablet 0    amphetamine-dextroamphetamine (ADDERALL) 20 MG tablet Take 15 mg in the morning and 20 mg in the afternoon 30 tablet 0    amphetamine-dextroamphetamine (ADDERALL) 20 MG tablet Take 1 tablet (20 mg) by mouth 2 times daily 30 tablet 0    buPROPion (WELLBUTRIN XL) 150 MG 24 hr tablet Take 1 tablet (150 mg) by mouth every morning 90 tablet 0    buPROPion (WELLBUTRIN) 75 MG tablet Take 1 tablet (75 mg) by mouth daily 30 tablet 1    fish oil-omega-3 fatty acids 1000 MG capsule Take 2 g by mouth daily      levonorgestrel (MIRENA) 20 MCG/24HR IUD 1 each by Intrauterine route once      multivitamin, therapeutic (THERA-VIT) TABS tablet Take 1 tablet by mouth daily         Allergies   Allergen Reactions    No Known Allergies        Family History   Problem Relation Age of Onset    Cancer Mother         thyroid    Hypertension Father     Lipids Father     Attention Deficit Disorder Father     Breast Cancer Other     Depression Other     Anxiety Disorder Other     Mental Illness Other     Substance Abuse Other         Most of father's side    Mental Illness Maternal Grandmother         Borderline personality       Additional medical/Social/Surgical histories reviewed in [facility] and updated as appropriate.       PHYSICAL EXAM  /74   Ht 1.702 m (5' 7\")   Wt 65.3 kg (144 lb)   BMI 22.55 kg/m      General  - normal appearance, in no obvious distress  Musculoskeletal - lumbar spine  - stance: normal gait and stance  - inspection: " "normal bone and joint alignment, no obvious scoliosis  - palpation:Nontender to palpation  - ROM: Full painless lumbar ROM  - strength: lower extremities 5/5 in all planes  - special tests:  (-) straight leg raise bilaterally  (-) slump test  Musculoskeletal - Right hip  - stance: normal gait without limp, normal single leg squat, no obvious leg length discrepancy, normal heel and toe walk  - inspection: no swelling or effusion,  normal bone and joint alignment, no obvious deformity  - palpation: no lateral or anterior hip tenderness  - ROM: normal flexion, extension, IR, ER, abduction, adduction, not painful, no crepitus  - strength: 5/5 in all planes  - special tests:  (-) ALLY  (-) FADIR  no pain with axial femoral load  Neuro  - grossly normal coordination, normal muscle tone, decreased sensation of S1 dermatome of thigh.  Skin  - no ecchymosis, erythema, warmth, or induration, no obvious rash  Psych  - interactive, appropriate, normal mood and affect    IMAGING : Deferred due to possible/uncertain pregnancy status     ASSESSMENT & PLAN  Ms. Rosado is a 34 year old year old female who presents to clinic today with acute right thigh numbness that began shortly after feeling low back pain during a run 2 weeks ago.    Medical history positive for \"sciatica 7 years ago\".    Diagnosis: Lumbar radiculitis active right lower extremity lumbar    Further diagnostic as well as treatment measures discussed.  I did recommend an x-ray, however she is unsure of her pregnancy status.  We discussed holding off on x-ray given lack of red flag symptoms and duration of less than 6 weeks.  I have recommended starting with formal physical therapy and placed an acute spine referral.  In addition to this I have recommended a prednisone burst for the next week.  She can begin this once her pregnancy test is completed and negative.  She understands she will not take meds if she is indeed pregnant.  Heat/ice as adjunct treatment and I " did also provide number stretches she can begin immediately.      If no improvement over the next 4 weeks, would like to see her back to discuss additional imaging in the form of x-ray at that time, consideration for MRI, especially if she is indeed pregnant, as well as alternative medication choices.    Red flag symptoms reviewed for immediate follow-up to include worsening numbness affecting entire lower leg, weakness, severely worsening pain, bowel or bladder dysfunction    It was a pleasure seeing Gerri today.    Jaleel Currie DO, CAQSM  Primary Care Sports Medicine

## 2024-09-19 ENCOUNTER — OFFICE VISIT (OUTPATIENT)
Dept: ORTHOPEDICS | Facility: CLINIC | Age: 34
End: 2024-09-19
Payer: COMMERCIAL

## 2024-09-19 VITALS
HEIGHT: 67 IN | SYSTOLIC BLOOD PRESSURE: 114 MMHG | DIASTOLIC BLOOD PRESSURE: 74 MMHG | BODY MASS INDEX: 22.6 KG/M2 | WEIGHT: 144 LBS

## 2024-09-19 DIAGNOSIS — R20.0 NUMBNESS AND TINGLING OF RIGHT LOWER EXTREMITY: Primary | ICD-10-CM

## 2024-09-19 DIAGNOSIS — M54.16 LUMBAR RADICULOPATHY, RIGHT: ICD-10-CM

## 2024-09-19 DIAGNOSIS — R20.2 NUMBNESS AND TINGLING OF RIGHT LOWER EXTREMITY: Primary | ICD-10-CM

## 2024-09-19 PROCEDURE — 99214 OFFICE O/P EST MOD 30 MIN: CPT | Performed by: FAMILY MEDICINE

## 2024-09-19 RX ORDER — PREDNISONE 20 MG/1
40 TABLET ORAL DAILY
Qty: 14 TABLET | Refills: 0 | Status: SHIPPED | OUTPATIENT
Start: 2024-09-19

## 2024-09-19 NOTE — LETTER
9/19/2024      Gerri oRsado  [address] [place] [address]      Dear Colleague,    Thank you for referring your patient, Gerri Rosado, to the [facility] [place]. Please see a copy of my visit note below.    CHIEF COMPLAINT:  Pain of the Right Hip     HISTORY OF PRESENT ILLNESS  Ms. Rosado is a pleasant 34 year old year old female who presents to clinic today with right hip pain.  Gerri explains that she has numbness on the back of her hip extending into her knee region.  Does not pass beyond her knee.  She states that she initially experienced pain of her low back and of her right shin after running for exercise.  She did not think much of it but then began to experience increased. She has a history of sciatica that she has treated about 7 years ago with conservative cares.    She notes at that time with her sciatic symptoms 7 years ago, it felt like a sharp stabbing pain down her right leg.  She notes that this feels much different given that numbness is the predominant symptom and has only occasional dull pain.    Denies any weakness.  Has not experienced any significant back pain at this time, only at the onset of this episode.    Onset: gradual  Location: right hip  Quality:  dull  Duration: 2 weeks   Severity: 3/10 at worst  Timing:intermittent episodes random  Modifying factors: rest does not seem to help, movement causes some soreness/pain  Associated signs & symptoms: pain and numbness/tingling  Previous similar pain: Yes, sciatica in the past. Numbness of upper extremity. Flexible joints.   Treatments to date: Stretching    Additional history: as documented    Review of Systems:  A 10-point review of systems was obtained and is negative except for as noted in the HPI.     MEDICAL HISTORY  Patient Active Problem List   Diagnosis     CARDIOVASCULAR SCREENING; LDL GOAL LESS THAN 160     Anxiety     Concentration deficit     ADHD, inattentive type     IUD  (intrauterine device) in place     Controlled substance agreement broken     Depression, unspecified depression type     Controlled substance agreement signed- reviewed-no concerns 2/22/19     Right elbow pain       Current Outpatient Medications   Medication Sig Dispense Refill     amphetamine-dextroamphetamine (ADDERALL) 15 MG tablet Take 15 mg in the morning and 20 mg in the afternoon 30 tablet 0     amphetamine-dextroamphetamine (ADDERALL) 15 MG tablet Take 15 mg in the morning and 20 mg in the afternoon 30 tablet 0     amphetamine-dextroamphetamine (ADDERALL) 15 MG tablet Take 15 mg in the morning and 20 mg in the afternoon 30 tablet 0     amphetamine-dextroamphetamine (ADDERALL) 20 MG tablet Take 15 mg in the morning and 20 mg in the afternoon 30 tablet 0     amphetamine-dextroamphetamine (ADDERALL) 20 MG tablet Take 15 mg in the morning and 20 mg in the afternoon 30 tablet 0     amphetamine-dextroamphetamine (ADDERALL) 20 MG tablet Take 1 tablet (20 mg) by mouth 2 times daily 30 tablet 0     buPROPion (WELLBUTRIN XL) 150 MG 24 hr tablet Take 1 tablet (150 mg) by mouth every morning 90 tablet 0     buPROPion (WELLBUTRIN) 75 MG tablet Take 1 tablet (75 mg) by mouth daily 30 tablet 1     fish oil-omega-3 fatty acids 1000 MG capsule Take 2 g by mouth daily       levonorgestrel (MIRENA) 20 MCG/24HR IUD 1 each by Intrauterine route once       multivitamin, therapeutic (THERA-VIT) TABS tablet Take 1 tablet by mouth daily         Allergies   Allergen Reactions     No Known Allergies        Family History   Problem Relation Age of Onset     Cancer Mother         thyroid     Hypertension Father      Lipids Father      Attention Deficit Disorder Father      Breast Cancer Other      Depression Other      Anxiety Disorder Other      Mental Illness Other      Substance Abuse Other         Most of father's side     Mental Illness Maternal Grandmother         Borderline personality       Additional medical/Social/Surgical  "histories reviewed in [facility] and updated as appropriate.       PHYSICAL EXAM  /74   Ht 1.702 m (5' 7\")   Wt 65.3 kg (144 lb)   BMI 22.55 kg/m      General  - normal appearance, in no obvious distress  Musculoskeletal - lumbar spine  - stance: normal gait and stance  - inspection: normal bone and joint alignment, no obvious scoliosis  - palpation:Nontender to palpation  - ROM: Full painless lumbar ROM  - strength: lower extremities 5/5 in all planes  - special tests:  (-) straight leg raise bilaterally  (-) slump test  Musculoskeletal - Right hip  - stance: normal gait without limp, normal single leg squat, no obvious leg length discrepancy, normal heel and toe walk  - inspection: no swelling or effusion,  normal bone and joint alignment, no obvious deformity  - palpation: no lateral or anterior hip tenderness  - ROM: normal flexion, extension, IR, ER, abduction, adduction, not painful, no crepitus  - strength: 5/5 in all planes  - special tests:  (-) ALLY  (-) FADIR  no pain with axial femoral load  Neuro  - grossly normal coordination, normal muscle tone, decreased sensation of S1 dermatome of thigh.  Skin  - no ecchymosis, erythema, warmth, or induration, no obvious rash  Psych  - interactive, appropriate, normal mood and affect    IMAGING : Deferred due to possible/uncertain pregnancy status     ASSESSMENT & PLAN  Ms. Rosado is a 34 year old year old female who presents to clinic today with acute right thigh numbness that began shortly after feeling low back pain during a run 2 weeks ago.    Medical history positive for \"sciatica 7 years ago\".    Diagnosis: Lumbar radiculitis active right lower extremity lumbar    Further diagnostic as well as treatment measures discussed.  I did recommend an x-ray, however she is unsure of her pregnancy status.  We discussed holding off on x-ray given lack of red flag symptoms and duration of less than 6 weeks.  I have recommended starting with formal physical therapy and " placed an acute spine referral.  In addition to this I have recommended a prednisone burst for the next week.  She can begin this once her pregnancy test is completed and negative.  She understands she will not take meds if she is indeed pregnant.  Heat/ice as adjunct treatment and I did also provide number stretches she can begin immediately.      If no improvement over the next 4 weeks, would like to see her back to discuss additional imaging in the form of x-ray at that time, consideration for MRI, especially if she is indeed pregnant, as well as alternative medication choices.    Red flag symptoms reviewed for immediate follow-up to include worsening numbness affecting entire lower leg, weakness, severely worsening pain, bowel or bladder dysfunction    It was a pleasure seeing Gerri today.    Jaleel Currie DO, [facility]  Primary Care Sports Medicine      Again, thank you for allowing me to participate in the care of your patient.        Sincerely,        Jaleel Currie DO

## 2024-09-19 NOTE — PATIENT INSTRUCTIONS
WHAT IS A HERNIATED DISK?    A herniated disk is a disk that has bulged out from its proper place in your neck or back. Disks are rubbery cushions between the bones of the spine (vertebrae). Disks act as shock absorbers between each of the bones of the spine. When a disk bulges out, it may press on nearby nerves and cause pain and other symptoms.    Sometimes a herniated disk is called a ruptured disk.    WHAT IS THE CAUSE?    A herniated disk most often results from wear and tear on the spine as you get older. Sometimes it s caused by an injury. You may be more likely to have a herniated disk if you keep straining your back. This could happen, for example, from not using proper technique when you lift, push, or pull something heavy. Being overweight can also put extra stress on your back. You may also be at higher risk for a herniated disk if:    You are a smoker.  You sit for long periods of time without lower back support.  You drive a lot--for example, you are a .  WHAT ARE THE SYMPTOMS?    Symptoms of a herniated disk may start slowly or suddenly. Where you have symptoms depends on where the herniated disk is in your spine. The most common symptoms are numbness, tingling, pain, or weakness in your buttocks, shoulders, legs, or arms.    HOW IS IT DIAGNOSED?    Your healthcare provider will ask about your symptoms, activities, and medical history. Your provider will examine your spine. Tests may include:    Tests of the movement and reflexes of your arms and legs  X-rays or other types of scans of your spine  Electromyogram, which is a test of electrical activity in your muscles  HOW IS IT TREATED?    Your healthcare provider may recommend:    Rest. It's best to try to stay active, so try not to rest in bed longer than 1 to 2 days or the time your provider recommends.  Medicine. Several types of medicines may help lessen back pain. It may be medicine you take by mouth, or your provider may give a  steroid shot into your spine. Take all medicine as recommended by your healthcare provider.  Physical therapy. This may include massage, traction (force applied to your spine to help relieve pressure on your nerves), or other treatments. You may be given exercises to help strengthen your back so you are less likely to hurt it. You may learn how to protect your back when you are working or playing sports.  A neck collar or neck brace. Wearing a brace for a short time may help keep your neck in the right position while it is healing.  With treatment, the pain should get better within a few weeks, but you may keep having some pain for a few months. If you keep having symptoms, your provider may recommend surgery, but usually surgery isn t needed.    HOW CAN I TAKE CARE OF MYSELF?    To help relieve pain:    Take pain medicine according to your healthcare provider s instructions.  Put an ice pack, gel pack, or package of frozen vegetables wrapped in a cloth on the painful area every 3 to 4 hours for up to 20 minutes at a time. After a few days, a heating pad set on low, or a covered hot water bottle, may also help.  Always use good posture to keep extra pressure off your spine.    Stand up straight with your shoulders back and your belly in. If you have to stand for a long time, move around often and shift your weight from one foot to another. If possible, put one foot up on a footrest that is about 6 to 8 inches high. This keeps your back straight and puts less pressure on your spine.  Sit in chairs that give good support for your lower back Keep your feet flat on the floor or up on a foot rest. Get up every 20 minutes or so and stretch.  When you need to lift something heavy, don't bend from your waist. Bend your knees and squat down by the thing you are lifting. Keep your back as straight as possible. Use your thigh muscles instead of your back to do the lifting. Don t twist. Always keep things close to your body  when you lift, lower, or carry them.    When you sleep, find the position that s most comfortable for you and that supports your back. For example:    Lie flat on your back on a firm mattress or on a mattress with a stiff board under it. Put a pillow under your knees when you lie on your back.  Lie on your belly with a pillow under your chest  Lie on your side with a pillow between your legs.  If you cannot get comfortable, try lying flat on your back with your legs raised so that your knees are bent at a 90-degree angle. This is the same angle they would be if you were sitting up straight in a chair. One way to rest in this position is to lie on the floor, bend your knees, and rest your lower legs on the seat of a chair.  Follow your healthcare provider's instructions, including any exercises recommended by your provider. Ask your provider:    How and when you will hear your test results  How long it will take to recover  What activities you should avoid and when you can return to your normal activities  How to take care of yourself at home  What symptoms or problems you should watch for and what to do if you have them  Make sure you know when you should come back for a checkup.    HOW CAN I HELP PREVENT A HERNIATED DISK?    Keep your muscles strong so that they can help support your spine better. Walking and swimming are examples of good exercise for strengthening and protecting your spine.  Lose weight if you are overweight.  Practice good posture.        Herniated Disc Exercises    Side plank: Lie on your side with your legs, hips, and shoulders in a straight line. Prop yourself up onto your forearm with your elbow directly under your shoulder. Lift your hips off the floor and balance on your forearm and the outside of your foot. Try to hold this position for 15 seconds and then slowly lower your hip to the ground. Switch sides and repeat. Work up to holding for 1 minute. This exercise can be made easier by  starting with your knees and hips flexed toward your chest.    Gluteal stretch: Lie on your back with both knees bent. Rest your right ankle over the knee of your left leg. Grasp the thigh of the left leg and pull toward your chest. You will feel a stretch along the buttocks and possibly along the outside of your hip. Hold the stretch for 15 to 30 seconds. Then repeat the exercise with your left ankle over your right knee. Do the exercise 3 times with each leg.    Quadruped arm and leg raise: Get down on your hands and knees. Pull in your belly button and tighten your abdominal muscles to stiffen your spine. While keeping your abdominals tight, raise one arm and the opposite leg away from you. Hold this position for 5 seconds. Lower your arm and leg slowly and change sides. Do this 10 times on each side.    Extension exercise  Lie face down on the floor for 5 minutes. If this hurts too much, lie face down with a pillow under your stomach. This should relieve your leg or back pain. When you can lie on your stomach for 5 minutes without a pillow, you can continue with Part B of this exercise.    After lying on your stomach for 5 minutes, prop yourself up on your elbows for another 5 minutes. If you can do this without having more leg or buttock pain, you can start doing part C of this exercise.    Lie on your stomach with your hands under your shoulders. Then press down on your hands and extend your elbows while keeping your hips flat on the floor. Hold for 1 second and lower yourself to the floor. Do 3 to 5 sets of 10 repetitions. Rest for 1 minute between sets. You should have no pain in your legs when you do this, but it is normal to feel some pain in your lower back.    Do this exercise several times a day.    Dead bug: Lie on your back with your knees bent, arms at your sides, and feet flat on the floor. Draw in your abdomen and tighten your abdominal muscles. While keeping your abdominal muscles tight and knees  bent, lift one leg several inches off the floor, hold for 5 seconds, and then lower it. Repeat this exercise with the opposite leg. Then lift your arm over your head, hold for 5 seconds, and then lower it. Repeat with the opposite arm. Do 5 repetitions with each leg and arm.  Once this exercise gets easy, raise one leg and the opposite arm together. Hold for 5 seconds. Lower your arm and leg and raise the opposite arm and leg up and hold for 5 seconds. Do 3 sets of 5 repetitions.    Walking is also good exercise for you.    If you have a herniated disk, you should not drive or sit for more than 30 minutes at a time.    Developed by [employer].  Published by [employer].  Copyright  2014 [employer] and/or one of its subsidiaries. All rights reserved.

## 2024-09-23 NOTE — PROGRESS NOTES
PHYSICAL THERAPY EVALUATION  Type of Visit: Evaluation              Subjective       Presenting condition or subjective complaint: Experiencing numbness in right thight. Doc suspects is due to lower back nerve pinch.  Date of onset: 10/08/24    Relevant medical history: Cold or hot arm or leg; Depression   Dates & types of surgery: None    Prior diagnostic imaging/testing results:       Prior therapy history for the same diagnosis, illness or injury: No      Living Environment  Social support: With a significant other or spouse   Type of home: House; 2-story   Stairs to enter the home: Yes   Is there a railing: Yes     Ramp: No   Stairs inside the home: Yes   Is there a railing: Yes     Help at home: None  Equipment owned:       Employment: Yes Mental health therapist  Hobbies/Interests: Running, reading, couch potato, cooking, martial arts    Patient goals for therapy: Get back into running, leg not numb anymore, comfortable to sit    Gerri Rosado is a 34 year old female with a lumbar condition. Mechanism of injury: Right low back, hip and thigh pain starting after running for exercise. Where: (home, work, MVA, community, recreation/sport, unknown, other): community. Location of symptoms: low back, right posterior/lateral hip and thigh. Pain level on number scale: 3/10. Quality of pain: sharp, dull ache. Associated symptoms: numbness. Pain frequency (constant/intermittent): intermittent. Symptoms are exacerbated by: running, sitting, sit<>stand. Symptoms are relieved by: patient unsure. Progression of symptoms since onset (same/better/worse): same. Special tests (x-ray, MRI, CT scan, EMG, bone scan): x-ray. Previous treatment: None. Improvement with previous treatment: NA. General health as reported by patient is good. Pertinent medical history includes:  see Epic. Medical allergies includes: see Epic. Surgical history includes: see Epic. Current medications include: see Epic. Occupation: Mental health therapist.  Patient is (working in normal job without restrictions/working in normal job with restrictions/working in an alternate job/not working due to present treatment problem): working in normal job. Primary job tasks: sitting. Barriers at home/work: None reported by patient. Red flags: None reported by patient.     Objective   Posture    Sitting (good/fair/poor): fair  Standing (good/fair/poor):  fair  Lordosis (red/acc/normal):  normal  Lateral shift (right/left/nil):  nil  Relevant lateral shift (yes/no):  no  Correction of posture (better/worse/no effect): better    Neurological    Myotomes L R   L1-2 (hip flexion) 5/5 5/5   L3 (knee extension) 5/5 5/5   L4 (ankle DF) 5/5 5/5   L5 (g. toe ext) 5/5 5/5   S1 (ankle PF or knee flex) 5/5 5/5     Sensory Deficit, Reflexes: lumbar dermatomes WNL    Lumbar Movement Loss Julius Mod Min Nil Pain   Flexion    x +right thigh and buttock   Extension   x  +   Side Gliding L        Side Gliding R          Assessment & Plan   CLINICAL IMPRESSIONS  Medical Diagnosis: Numbness and tingling of right lower extremity, Lumbar radiculopathy, right    Treatment Diagnosis: Numbness and tingling of right lower extremity, Lumbar radiculopathy, right   Impression/Assessment: Patient is a 34 year old female with lumbar complaints.  The following significant findings have been identified: Pain, Decreased ROM/flexibility, Decreased joint mobility, Decreased strength, Impaired muscle performance, Decreased activity tolerance, and Impaired posture. These impairments interfere with their ability to perform self care tasks, recreational activities, household chores, driving , household mobility, and community mobility as compared to previous level of function.     Clinical Decision Making (Complexity):  Clinical Presentation: Stable/Uncomplicated  Clinical Presentation Rationale: based on medical and personal factors listed in PT evaluation  Clinical Decision Making (Complexity): Low complexity    PLAN OF  CARE  Treatment Interventions:  Interventions: Manual Therapy, Neuromuscular Re-education, Therapeutic Activity, Therapeutic Exercise, Self-Care/Home Management    Long Term Goals     PT Goal 1  Goal Description: Patient will be able to sit 30 minutes with 0/10 pain  Rationale: to maximize safety and independence with transportation  [place] Date: 12/17/24      Frequency of Treatment: 1x/week  Duration of Treatment: for 4 weeks tapering down to 2x/month for 8 weeks    Recommended Referrals to Other Professionals:  None  Education Assessment:   Learner/Method: Patient;No Barriers to Learning    Risks and benefits of evaluation/treatment have been explained.   Patient/Family/caregiver agrees with Plan of Care.     Evaluation Time:     PT Eval, Low Complexity Minutes (39617): 15  Signing Clinician: Jimbo Geiger PT

## 2024-09-24 ENCOUNTER — PATIENT OUTREACH (OUTPATIENT)
Dept: CARE COORDINATION | Facility: CLINIC | Age: 34
End: 2024-09-24

## 2024-09-24 ENCOUNTER — THERAPY VISIT (OUTPATIENT)
Dept: PHYSICAL THERAPY | Facility: CLINIC | Age: 34
End: 2024-09-24
Payer: COMMERCIAL

## 2024-09-24 DIAGNOSIS — M54.16 LUMBAR RADICULOPATHY, RIGHT: ICD-10-CM

## 2024-09-24 DIAGNOSIS — R20.0 NUMBNESS AND TINGLING OF RIGHT LOWER EXTREMITY: ICD-10-CM

## 2024-09-24 DIAGNOSIS — R20.2 NUMBNESS AND TINGLING OF RIGHT LOWER EXTREMITY: ICD-10-CM

## 2024-09-24 PROCEDURE — 97161 PT EVAL LOW COMPLEX 20 MIN: CPT | Mod: GP | Performed by: PHYSICAL THERAPIST

## 2024-09-24 PROCEDURE — 97530 THERAPEUTIC ACTIVITIES: CPT | Mod: GP | Performed by: PHYSICAL THERAPIST

## 2024-09-24 PROCEDURE — 97110 THERAPEUTIC EXERCISES: CPT | Mod: GP | Performed by: PHYSICAL THERAPIST

## 2024-10-02 ENCOUNTER — THERAPY VISIT (OUTPATIENT)
Dept: PHYSICAL THERAPY | Facility: CLINIC | Age: 34
End: 2024-10-02
Payer: COMMERCIAL

## 2024-10-02 DIAGNOSIS — M54.16 LUMBAR RADICULOPATHY, RIGHT: ICD-10-CM

## 2024-10-02 DIAGNOSIS — R20.0 NUMBNESS AND TINGLING OF RIGHT LOWER EXTREMITY: Primary | ICD-10-CM

## 2024-10-02 DIAGNOSIS — R20.2 NUMBNESS AND TINGLING OF RIGHT LOWER EXTREMITY: Primary | ICD-10-CM

## 2024-10-02 PROCEDURE — 97110 THERAPEUTIC EXERCISES: CPT | Mod: GP | Performed by: PHYSICAL THERAPIST

## 2024-10-02 PROCEDURE — 97530 THERAPEUTIC ACTIVITIES: CPT | Mod: GP | Performed by: PHYSICAL THERAPIST

## 2024-10-15 ENCOUNTER — THERAPY VISIT (OUTPATIENT)
Dept: PHYSICAL THERAPY | Facility: CLINIC | Age: 34
End: 2024-10-15
Payer: COMMERCIAL

## 2024-10-15 DIAGNOSIS — R20.0 NUMBNESS AND TINGLING OF RIGHT LOWER EXTREMITY: Primary | ICD-10-CM

## 2024-10-15 DIAGNOSIS — M54.16 LUMBAR RADICULOPATHY, RIGHT: ICD-10-CM

## 2024-10-15 DIAGNOSIS — R20.2 NUMBNESS AND TINGLING OF RIGHT LOWER EXTREMITY: Primary | ICD-10-CM

## 2024-10-15 PROCEDURE — 97110 THERAPEUTIC EXERCISES: CPT | Mod: GP | Performed by: PHYSICAL THERAPIST

## 2024-10-15 PROCEDURE — 97530 THERAPEUTIC ACTIVITIES: CPT | Mod: GP | Performed by: PHYSICAL THERAPIST

## 2024-10-24 ENCOUNTER — MYC MEDICAL ADVICE (OUTPATIENT)
Dept: ORTHOPEDICS | Facility: CLINIC | Age: 34
End: 2024-10-24
Payer: COMMERCIAL

## 2024-10-28 DIAGNOSIS — M54.16 LUMBAR RADICULOPATHY, RIGHT: Primary | ICD-10-CM

## 2024-10-28 RX ORDER — METHYLPREDNISOLONE 4 MG/1
TABLET ORAL
Qty: 21 TABLET | Refills: 0 | Status: SHIPPED | OUTPATIENT
Start: 2024-10-28

## 2024-10-28 NOTE — TELEPHONE ENCOUNTER
Outbound call made to patient. No answer, no consent to communicate. Writer provided scheduling number for call back and will also send patient a [email or web address] message.    Billie Jhaveri, IRENE, ATC, LAT

## 2024-10-28 NOTE — TELEPHONE ENCOUNTER
I placed this order for Gerri. Medrol pack sent [facility] [place].  I would like her to take this and continue with Jimbo for another 3 weeks.  Please assist her in scheduling 4 weeks from her last appt. We can review progress and consider imaging at that time.    Thanks

## 2024-10-31 ENCOUNTER — TELEPHONE (OUTPATIENT)
Dept: INTERNAL MEDICINE | Facility: CLINIC | Age: 34
End: 2024-10-31
Payer: COMMERCIAL

## 2024-10-31 DIAGNOSIS — F90.0 ATTENTION DEFICIT HYPERACTIVITY DISORDER (ADHD), PREDOMINANTLY INATTENTIVE TYPE: Chronic | ICD-10-CM

## 2024-11-01 RX ORDER — DEXTROAMPHETAMINE SACCHARATE, AMPHETAMINE ASPARTATE, DEXTROAMPHETAMINE SULFATE AND AMPHETAMINE SULFATE 3.75; 3.75; 3.75; 3.75 MG/1; MG/1; MG/1; MG/1
TABLET ORAL
Qty: 30 TABLET | Refills: 0 | Status: SHIPPED | OUTPATIENT
Start: 2024-11-01

## 2024-11-01 RX ORDER — DEXTROAMPHETAMINE SACCHARATE, AMPHETAMINE ASPARTATE, DEXTROAMPHETAMINE SULFATE AND AMPHETAMINE SULFATE 5; 5; 5; 5 MG/1; MG/1; MG/1; MG/1
20 TABLET ORAL 2 TIMES DAILY
Qty: 30 TABLET | Refills: 0 | Status: SHIPPED | OUTPATIENT
Start: 2024-11-01

## 2024-11-01 NOTE — TELEPHONE ENCOUNTER
Rx done    Please remind the patient that she needs to schedule her January appointment   Dr. Maravilla will not refill of the Adderall after January without a follow-up appointment

## 2024-11-07 ENCOUNTER — THERAPY VISIT (OUTPATIENT)
Dept: PHYSICAL THERAPY | Facility: CLINIC | Age: 34
End: 2024-11-07
Payer: COMMERCIAL

## 2024-11-07 DIAGNOSIS — R20.0 NUMBNESS AND TINGLING OF RIGHT LOWER EXTREMITY: Primary | ICD-10-CM

## 2024-11-07 DIAGNOSIS — R20.2 NUMBNESS AND TINGLING OF RIGHT LOWER EXTREMITY: Primary | ICD-10-CM

## 2024-11-07 DIAGNOSIS — M54.16 LUMBAR RADICULOPATHY, RIGHT: ICD-10-CM

## 2024-11-07 PROCEDURE — 97110 THERAPEUTIC EXERCISES: CPT | Mod: GP | Performed by: PHYSICAL THERAPIST

## 2024-11-07 PROCEDURE — 97530 THERAPEUTIC ACTIVITIES: CPT | Mod: GP | Performed by: PHYSICAL THERAPIST

## 2024-11-22 NOTE — PROGRESS NOTES
ESTABLISHED PATIENT FOLLOW-UP:  Pain of the Right Hip     HISTORY OF PRESENT ILLNESS  Ms. Rosado is a pleasant 34 year old year old female who presents to clinic today for follow-up of right sided numbness/tingling. Has been going to PT for R hip numbness, but now notices more pain in the R hip and superior anterior aspect of the R thigh. Feels the numbness is worse in the morning. Ibuprofen helps with back pain but not hip pain. Still going to PT.     Date of injury: Gradual  Date last seen: 9/19/2024  Following Therapeutic Plan: Physical Therapy  Pain: Now in lower back, hip flexor  Function: No improvements. Has not returned to running, walking only  Interval History: Patient was pregnant when arriving to clinic previously, we will xray this appointment. Pain has increased, numbness has decreased. Prednisone was not helpful.     Additional medical/Social/Surgical histories reviewed in [facility] and updated as appropriate.    REVIEW OF SYSTEMS (11/22/2024)  CONSTITUTIONAL: Denies fever and weight loss  GASTROINTESTINAL: Denies abdominal pain, nausea, vomiting  MUSCULOSKELETAL: See HPI  SKIN: Denies any recent rash or lesion  NEUROLOGICAL: Denies numbness or focal weakness     PHYSICAL EXAM  /72     Physical Exam  HENT:      Head: Normocephalic and atraumatic.   Musculoskeletal:      Comments: Lumbar flexion/extension/rotation/sidebending ROM intact. Mild tenderness to palpation of bilateral lumbar paraspinal muscles. Nontender to palpation of R piriformis and IT band. Normal passive and active hip IR/ER. Hip flexion and knee flexion/extension, dorsiflexion 5/5 strength. Negative Michelle test.    Neurological:      General: No focal deficit present.      Mental Status: She is oriented to person, place, and time.      Sensory: No sensory deficit.      Motor: No weakness.       IMAGING : Bilateral Lumbar X-ray. Final results and radiologist's interpretation, available in the [facility] health record. Images were reviewed with  the patient/family members in the office today. My personal interpretation of the performed imaging is no acute osseous abnormality or significant degenerative changes of joint.       ASSESSMENT & PLAN  Ms. Rosado is a 34 year old year old female who presents to clinic today with R sided back pain and R hip pain. Pain is not reproducible with palpation and/or straight leg raise and Michelle tests. Strength and sensation intact bilat LE. Considered R IT band vs R piriformis syndrome vs R hip bursitis vs lumbar radiculopathy.  Working diagnoses remains lumbar radiculitis refractory to PT, steroid course, NSAIDs at this time.    X-ray of lumbar spine reassuring against acute osseous pathologies and/or loss of disc height.     - MRI lumbar spine w/o contrast   - Follow up pending MRI results  - Red flag symptoms reviewed   - Start gabapentin 300mg at bedtime for sleep  - Consider AIDE if appropriate based on pathology noted on upcoming MRI    It was a pleasure seeing Gerri.    Electronically signed by:   Dr. Maya Hicks DO  Family Medicine PGY1    I, Jaleel Currie, was present with my resident, Dr. Maya Hicks DO PGY1 who participated in the service and in the documentation of the note.  I have verified the history and personally performed the physical exam and medical decision making.  I agree with the assessment and plan of care as documented in the note.  Plan for MRI due to lack of progress with PT, steroid course, activity modification x 2 months. Start gabapentin for improved sleep and reduced pain.    DO Jaleel Horvath DO, [facility]  Primary Care Sports Medicine

## 2024-11-27 ENCOUNTER — ANCILLARY PROCEDURE (OUTPATIENT)
Dept: GENERAL RADIOLOGY | Facility: CLINIC | Age: 34
End: 2024-11-27
Attending: FAMILY MEDICINE
Payer: COMMERCIAL

## 2024-11-27 ENCOUNTER — OFFICE VISIT (OUTPATIENT)
Dept: ORTHOPEDICS | Facility: CLINIC | Age: 34
End: 2024-11-27
Payer: COMMERCIAL

## 2024-11-27 VITALS — SYSTOLIC BLOOD PRESSURE: 122 MMHG | DIASTOLIC BLOOD PRESSURE: 72 MMHG

## 2024-11-27 DIAGNOSIS — M54.16 LUMBAR RADICULOPATHY, RIGHT: ICD-10-CM

## 2024-11-27 PROCEDURE — 99214 OFFICE O/P EST MOD 30 MIN: CPT | Performed by: FAMILY MEDICINE

## 2024-11-27 RX ORDER — GABAPENTIN 300 MG/1
300 CAPSULE ORAL AT BEDTIME
Qty: 30 CAPSULE | Refills: 0 | Status: SHIPPED | OUTPATIENT
Start: 2024-11-27

## 2024-11-27 ASSESSMENT — PATIENT HEALTH QUESTIONNAIRE - PHQ9: SUM OF ALL RESPONSES TO PHQ QUESTIONS 1-9: 8

## 2024-11-27 NOTE — LETTER
11/27/2024      Gerri Rosado  [address] [place] [address]      Dear Colleague,    Thank you for referring your patient, Gerri Rosado, to the [facility] [place]. Please see a copy of my visit note below.    ESTABLISHED PATIENT FOLLOW-UP:  Pain of the Right Hip     HISTORY OF PRESENT ILLNESS  Ms. Rosado is a pleasant 34 year old year old female who presents to clinic today for follow-up of right sided numbness/tingling. Has been going to PT for R hip numbness, but now notices more pain in the R hip and superior anterior aspect of the R thigh. Feels the numbness is worse in the morning. Ibuprofen helps with back pain but not hip pain. Still going to PT.     Date of injury: Gradual  Date last seen: 9/19/2024  Following Therapeutic Plan: Physical Therapy  Pain: Now in lower back, hip flexor  Function: No improvements. Has not returned to running, walking only  Interval History: Patient was pregnant when arriving to clinic previously, we will xray this appointment. Pain has increased, numbness has decreased. Prednisone was not helpful.     Additional medical/Social/Surgical histories reviewed in [facility] and updated as appropriate.    REVIEW OF SYSTEMS (11/22/2024)  CONSTITUTIONAL: Denies fever and weight loss  GASTROINTESTINAL: Denies abdominal pain, nausea, vomiting  MUSCULOSKELETAL: See HPI  SKIN: Denies any recent rash or lesion  NEUROLOGICAL: Denies numbness or focal weakness     PHYSICAL EXAM  /72     Physical Exam  HENT:      Head: Normocephalic and atraumatic.   Musculoskeletal:      Comments: Lumbar flexion/extension/rotation/sidebending ROM intact. Mild tenderness to palpation of bilateral lumbar paraspinal muscles. Nontender to palpation of R piriformis and IT band. Normal passive and active hip IR/ER. Hip flexion and knee flexion/extension, dorsiflexion 5/5 strength. Negative Michelle test.    Neurological:      General: No focal deficit present.      Mental  Status: She is oriented to person, place, and time.      Sensory: No sensory deficit.      Motor: No weakness.       IMAGING : Bilateral Lumbar X-ray. Final results and radiologist's interpretation, available in the [facility] health record. Images were reviewed with the patient/family members in the office today. My personal interpretation of the performed imaging is no acute osseous abnormality or significant degenerative changes of joint.       ASSESSMENT & PLAN  Ms. Rosado is a 34 year old year old female who presents to clinic today with R sided back pain and R hip pain. Pain is not reproducible with palpation and/or straight leg raise and Michelle tests. Strength and sensation intact bilat LE. Considered R IT band vs R piriformis syndrome vs R hip bursitis vs lumbar radiculopathy.  Working diagnoses remains lumbar radiculitis refractory to PT, steroid course, NSAIDs at this time.    X-ray of lumbar spine reassuring against acute osseous pathologies and/or loss of disc height.     - MRI lumbar spine w/o contrast   - Follow up pending MRI results  - Red flag symptoms reviewed   - Start gabapentin 300mg at bedtime for sleep  - Consider AIDE if appropriate based on pathology noted on upcoming MRI    It was a pleasure seeing Gerri.    Electronically signed by:   Dr. Maya Hicks DO  Family Medicine PGY1    I, Jaleel Currie, was present with my resident, Dr. Maya Hicks,  PGY1 who participated in the service and in the documentation of the note.  I have verified the history and personally performed the physical exam and medical decision making.  I agree with the assessment and plan of care as documented in the note.  Plan for MRI due to lack of progress with PT, steroid course, activity modification x 2 months. Start gabapentin for improved sleep and reduced pain.    DO Jaleel Horvath DO, [facility]  Primary Care Sports Medicine      Again, thank you for allowing me to participate in the care of  your patient.        Sincerely,        Jaleel Currie, DO

## 2024-12-15 ENCOUNTER — HOSPITAL ENCOUNTER (OUTPATIENT)
Dept: MRI IMAGING | Facility: CLINIC | Age: 34
Discharge: HOME | End: 2024-12-15
Attending: FAMILY MEDICINE | Admitting: FAMILY MEDICINE
Payer: COMMERCIAL

## 2024-12-15 DIAGNOSIS — M54.16 LUMBAR RADICULOPATHY, RIGHT: ICD-10-CM

## 2024-12-15 PROCEDURE — 72148 MRI LUMBAR SPINE W/O DYE: CPT

## 2024-12-19 DIAGNOSIS — F90.0 ATTENTION DEFICIT HYPERACTIVITY DISORDER (ADHD), PREDOMINANTLY INATTENTIVE TYPE: Chronic | ICD-10-CM

## 2024-12-19 RX ORDER — DEXTROAMPHETAMINE SACCHARATE, AMPHETAMINE ASPARTATE, DEXTROAMPHETAMINE SULFATE AND AMPHETAMINE SULFATE 5; 5; 5; 5 MG/1; MG/1; MG/1; MG/1
TABLET ORAL
Qty: 30 TABLET | Refills: 0 | Status: SHIPPED | OUTPATIENT
Start: 2024-12-30

## 2024-12-19 RX ORDER — DEXTROAMPHETAMINE SACCHARATE, AMPHETAMINE ASPARTATE, DEXTROAMPHETAMINE SULFATE AND AMPHETAMINE SULFATE 3.75; 3.75; 3.75; 3.75 MG/1; MG/1; MG/1; MG/1
TABLET ORAL
Qty: 30 TABLET | Refills: 0 | Status: SHIPPED | OUTPATIENT
Start: 2024-12-30

## 2024-12-30 DIAGNOSIS — M54.16 LUMBAR RADICULOPATHY, RIGHT: Primary | ICD-10-CM

## 2025-01-20 ENCOUNTER — TELEPHONE (OUTPATIENT)
Dept: INTERVENTIONAL RADIOLOGY/VASCULAR | Facility: CLINIC | Age: 35
End: 2025-01-20
Payer: COMMERCIAL

## 2025-01-20 NOTE — TELEPHONE ENCOUNTER
Left voicemail for patient regarding upcoming epidural steroid injection on 1/22/24. Patient given number to call back. No medication to be held. Patient will need  for this appointment and procedure.

## 2025-01-27 ENCOUNTER — HOSPITAL ENCOUNTER (OUTPATIENT)
Facility: CLINIC | Age: 35
End: 2025-01-27
Payer: COMMERCIAL

## 2025-01-28 ENCOUNTER — TELEPHONE (OUTPATIENT)
Dept: MEDSURG UNIT | Facility: CLINIC | Age: 35
End: 2025-01-28
Payer: COMMERCIAL

## 2025-05-08 ENCOUNTER — PATIENT OUTREACH (OUTPATIENT)
Dept: CARE COORDINATION | Facility: CLINIC | Age: 35
End: 2025-05-08
Payer: COMMERCIAL

## 2025-05-13 ENCOUNTER — E-VISIT (OUTPATIENT)
Dept: INTERNAL MEDICINE | Facility: CLINIC | Age: 35
End: 2025-05-13
Payer: COMMERCIAL

## 2025-05-13 DIAGNOSIS — Z11.59 ENCOUNTER FOR HEPATITIS C SCREENING TEST FOR LOW RISK PATIENT: Primary | ICD-10-CM

## 2025-05-13 PROCEDURE — 99207 PR NON-BILLABLE SERV PER CHARTING: CPT | Performed by: INTERNAL MEDICINE

## 2025-05-15 ENCOUNTER — LAB (OUTPATIENT)
Dept: LAB | Facility: CLINIC | Age: 35
End: 2025-05-15
Payer: COMMERCIAL

## 2025-05-15 DIAGNOSIS — Z11.59 ENCOUNTER FOR HEPATITIS C SCREENING TEST FOR LOW RISK PATIENT: ICD-10-CM

## 2025-05-19 ENCOUNTER — RESULTS FOLLOW-UP (OUTPATIENT)
Dept: INTERNAL MEDICINE | Facility: CLINIC | Age: 35
End: 2025-05-19

## 2025-07-26 ENCOUNTER — HEALTH MAINTENANCE LETTER (OUTPATIENT)
Age: 35
End: 2025-07-26

## 2025-08-12 ENCOUNTER — OFFICE VISIT (OUTPATIENT)
Dept: FAMILY MEDICINE | Facility: CLINIC | Age: 35
End: 2025-08-12
Payer: COMMERCIAL

## 2025-08-12 VITALS
HEART RATE: 116 BPM | WEIGHT: 144 LBS | OXYGEN SATURATION: 99 % | SYSTOLIC BLOOD PRESSURE: 108 MMHG | RESPIRATION RATE: 16 BRPM | DIASTOLIC BLOOD PRESSURE: 80 MMHG | BODY MASS INDEX: 22.6 KG/M2 | HEIGHT: 67 IN

## 2025-08-12 DIAGNOSIS — Z31.69 ENCOUNTER FOR PRECONCEPTION CONSULTATION: ICD-10-CM

## 2025-08-12 DIAGNOSIS — N83.202 LEFT OVARIAN CYST: ICD-10-CM

## 2025-08-12 DIAGNOSIS — F90.0 ATTENTION DEFICIT HYPERACTIVITY DISORDER (ADHD), PREDOMINANTLY INATTENTIVE TYPE: Primary | Chronic | ICD-10-CM

## 2025-08-12 DIAGNOSIS — Q79.62 HYPERMOBILE EHLERS-DANLOS SYNDROME: ICD-10-CM

## 2025-08-12 PROCEDURE — 3074F SYST BP LT 130 MM HG: CPT

## 2025-08-12 PROCEDURE — G2211 COMPLEX E/M VISIT ADD ON: HCPCS

## 2025-08-12 PROCEDURE — 3079F DIAST BP 80-89 MM HG: CPT

## 2025-08-12 PROCEDURE — 99204 OFFICE O/P NEW MOD 45 MIN: CPT

## 2025-08-12 RX ORDER — DEXTROAMPHETAMINE SACCHARATE, AMPHETAMINE ASPARTATE, DEXTROAMPHETAMINE SULFATE AND AMPHETAMINE SULFATE 5; 5; 5; 5 MG/1; MG/1; MG/1; MG/1
20 TABLET ORAL 2 TIMES DAILY
Qty: 60 TABLET | Refills: 0 | Status: SHIPPED | OUTPATIENT
Start: 2025-08-12 | End: 2025-09-11

## 2025-08-12 RX ORDER — DEXTROAMPHETAMINE SACCHARATE, AMPHETAMINE ASPARTATE MONOHYDRATE, DEXTROAMPHETAMINE SULFATE AND AMPHETAMINE SULFATE 5; 5; 5; 5 MG/1; MG/1; MG/1; MG/1
20 CAPSULE, EXTENDED RELEASE ORAL 2 TIMES DAILY
COMMUNITY
End: 2025-08-12

## 2025-08-12 RX ORDER — DEXTROAMPHETAMINE SACCHARATE, AMPHETAMINE ASPARTATE, DEXTROAMPHETAMINE SULFATE AND AMPHETAMINE SULFATE 5; 5; 5; 5 MG/1; MG/1; MG/1; MG/1
20 TABLET ORAL 2 TIMES DAILY
Qty: 60 TABLET | Refills: 0 | Status: SHIPPED | OUTPATIENT
Start: 2025-10-11 | End: 2025-11-10

## 2025-08-12 RX ORDER — DEXTROAMPHETAMINE SACCHARATE, AMPHETAMINE ASPARTATE, DEXTROAMPHETAMINE SULFATE AND AMPHETAMINE SULFATE 5; 5; 5; 5 MG/1; MG/1; MG/1; MG/1
20 TABLET ORAL 2 TIMES DAILY
Qty: 60 TABLET | Refills: 0 | Status: SHIPPED | OUTPATIENT
Start: 2025-09-11 | End: 2025-10-11

## 2025-08-12 RX ORDER — DEXTROAMPHETAMINE SACCHARATE, AMPHETAMINE ASPARTATE, DEXTROAMPHETAMINE SULFATE AND AMPHETAMINE SULFATE 5; 5; 5; 5 MG/1; MG/1; MG/1; MG/1
20 TABLET ORAL DAILY
Qty: 30 TABLET | Refills: 0 | Status: SHIPPED | OUTPATIENT
Start: 2025-08-12 | End: 2025-08-12

## 2025-08-12 RX ORDER — DEXTROAMPHETAMINE SACCHARATE, AMPHETAMINE ASPARTATE, DEXTROAMPHETAMINE SULFATE AND AMPHETAMINE SULFATE 5; 5; 5; 5 MG/1; MG/1; MG/1; MG/1
20 TABLET ORAL DAILY
Qty: 30 TABLET | Refills: 0 | Status: SHIPPED | OUTPATIENT
Start: 2025-09-11 | End: 2025-08-12

## 2025-08-12 RX ORDER — DEXTROAMPHETAMINE SACCHARATE, AMPHETAMINE ASPARTATE, DEXTROAMPHETAMINE SULFATE AND AMPHETAMINE SULFATE 5; 5; 5; 5 MG/1; MG/1; MG/1; MG/1
20 TABLET ORAL DAILY
Qty: 30 TABLET | Refills: 0 | Status: SHIPPED | OUTPATIENT
Start: 2025-10-11 | End: 2025-08-12

## 2025-08-12 ASSESSMENT — ANXIETY QUESTIONNAIRES
8. IF YOU CHECKED OFF ANY PROBLEMS, HOW DIFFICULT HAVE THESE MADE IT FOR YOU TO DO YOUR WORK, TAKE CARE OF THINGS AT HOME, OR GET ALONG WITH OTHER PEOPLE?: SOMEWHAT DIFFICULT
GAD7 TOTAL SCORE: 7
2. NOT BEING ABLE TO STOP OR CONTROL WORRYING: NOT AT ALL
GAD7 TOTAL SCORE: 7
6. BECOMING EASILY ANNOYED OR IRRITABLE: SEVERAL DAYS
1. FEELING NERVOUS, ANXIOUS, OR ON EDGE: SEVERAL DAYS
4. TROUBLE RELAXING: NOT AT ALL
5. BEING SO RESTLESS THAT IT IS HARD TO SIT STILL: NEARLY EVERY DAY
GAD7 TOTAL SCORE: 7
3. WORRYING TOO MUCH ABOUT DIFFERENT THINGS: SEVERAL DAYS
7. FEELING AFRAID AS IF SOMETHING AWFUL MIGHT HAPPEN: SEVERAL DAYS
IF YOU CHECKED OFF ANY PROBLEMS ON THIS QUESTIONNAIRE, HOW DIFFICULT HAVE THESE PROBLEMS MADE IT FOR YOU TO DO YOUR WORK, TAKE CARE OF THINGS AT HOME, OR GET ALONG WITH OTHER PEOPLE: SOMEWHAT DIFFICULT
7. FEELING AFRAID AS IF SOMETHING AWFUL MIGHT HAPPEN: SEVERAL DAYS

## 2025-08-13 ENCOUNTER — PATIENT OUTREACH (OUTPATIENT)
Dept: CARE COORDINATION | Facility: CLINIC | Age: 35
End: 2025-08-13
Payer: COMMERCIAL

## 2025-08-13 ENCOUNTER — MYC MEDICAL ADVICE (OUTPATIENT)
Dept: FAMILY MEDICINE | Facility: CLINIC | Age: 35
End: 2025-08-13
Payer: COMMERCIAL